# Patient Record
Sex: FEMALE | Race: BLACK OR AFRICAN AMERICAN | Employment: PART TIME
[De-identification: names, ages, dates, MRNs, and addresses within clinical notes are randomized per-mention and may not be internally consistent; named-entity substitution may affect disease eponyms.]

---

## 2017-01-05 RX ORDER — CETIRIZINE HYDROCHLORIDE 10 MG/1
TABLET ORAL
Qty: 30 TABLET | Refills: 1 | Status: SHIPPED | OUTPATIENT
Start: 2017-01-05 | End: 2017-03-02 | Stop reason: SDUPTHER

## 2017-01-12 RX ORDER — BUDESONIDE AND FORMOTEROL FUMARATE DIHYDRATE 160; 4.5 UG/1; UG/1
AEROSOL RESPIRATORY (INHALATION)
Qty: 10.2 INHALER | Refills: 11 | OUTPATIENT
Start: 2017-01-12

## 2017-01-13 RX ORDER — BUDESONIDE AND FORMOTEROL FUMARATE DIHYDRATE 160; 4.5 UG/1; UG/1
2 AEROSOL RESPIRATORY (INHALATION) 2 TIMES DAILY
Qty: 1 INHALER | Refills: 0 | Status: SHIPPED | OUTPATIENT
Start: 2017-01-13 | End: 2017-02-11 | Stop reason: SDUPTHER

## 2017-02-13 RX ORDER — BUDESONIDE AND FORMOTEROL FUMARATE DIHYDRATE 160; 4.5 UG/1; UG/1
AEROSOL RESPIRATORY (INHALATION)
Qty: 1 INHALER | Refills: 0 | Status: SHIPPED | OUTPATIENT
Start: 2017-02-13 | End: 2017-02-17 | Stop reason: SDUPTHER

## 2017-02-17 ENCOUNTER — OFFICE VISIT (OUTPATIENT)
Dept: PULMONOLOGY | Facility: CLINIC | Age: 47
End: 2017-02-17

## 2017-02-17 VITALS
RESPIRATION RATE: 16 BRPM | WEIGHT: 169.8 LBS | DIASTOLIC BLOOD PRESSURE: 96 MMHG | SYSTOLIC BLOOD PRESSURE: 145 MMHG | HEART RATE: 77 BPM | OXYGEN SATURATION: 99 % | HEIGHT: 69 IN | BODY MASS INDEX: 25.15 KG/M2

## 2017-02-17 DIAGNOSIS — Z76.0 MEDICATION REFILL: ICD-10-CM

## 2017-02-17 DIAGNOSIS — J45.909 UNCOMPLICATED ASTHMA, UNSPECIFIED ASTHMA SEVERITY: Primary | ICD-10-CM

## 2017-02-17 DIAGNOSIS — J44.9 CHRONIC OBSTRUCTIVE PULMONARY DISEASE, UNSPECIFIED COPD TYPE (HCC): ICD-10-CM

## 2017-02-17 DIAGNOSIS — K21.9 GASTROESOPHAGEAL REFLUX DISEASE WITHOUT ESOPHAGITIS: ICD-10-CM

## 2017-02-17 PROCEDURE — 99213 OFFICE O/P EST LOW 20 MIN: CPT | Performed by: NURSE PRACTITIONER

## 2017-02-17 RX ORDER — BUDESONIDE AND FORMOTEROL FUMARATE DIHYDRATE 160; 4.5 UG/1; UG/1
2 AEROSOL RESPIRATORY (INHALATION) 2 TIMES DAILY
Qty: 1 INHALER | Refills: 11 | Status: SHIPPED | OUTPATIENT
Start: 2017-02-17 | End: 2018-02-26 | Stop reason: SDUPTHER

## 2017-02-17 RX ORDER — AZITHROMYCIN 250 MG/1
TABLET, FILM COATED ORAL
Qty: 6 TABLET | Refills: 3 | Status: SHIPPED | OUTPATIENT
Start: 2017-02-17 | End: 2018-04-23

## 2017-02-17 RX ORDER — MONTELUKAST SODIUM 10 MG/1
TABLET ORAL
Qty: 30 TABLET | Refills: 11 | Status: SHIPPED | OUTPATIENT
Start: 2017-02-17 | End: 2018-03-07 | Stop reason: SDUPTHER

## 2017-02-17 RX ORDER — ALBUTEROL SULFATE 90 UG/1
2 AEROSOL, METERED RESPIRATORY (INHALATION) EVERY 6 HOURS PRN
Qty: 1 INHALER | Refills: 6 | Status: SHIPPED | OUTPATIENT
Start: 2017-02-17 | End: 2017-03-17 | Stop reason: DRUGHIGH

## 2017-02-17 RX ORDER — OMEPRAZOLE 20 MG/1
CAPSULE, DELAYED RELEASE ORAL
Qty: 30 CAPSULE | Refills: 11 | Status: SHIPPED | OUTPATIENT
Start: 2017-02-17 | End: 2018-02-26 | Stop reason: SDUPTHER

## 2017-03-02 RX ORDER — HYDROCHLOROTHIAZIDE 12.5 MG/1
CAPSULE, GELATIN COATED ORAL
Qty: 30 CAPSULE | Refills: 5 | Status: SHIPPED | OUTPATIENT
Start: 2017-03-02 | End: 2018-06-01 | Stop reason: ALTCHOICE

## 2017-03-02 RX ORDER — CETIRIZINE HYDROCHLORIDE 10 MG/1
TABLET ORAL
Qty: 30 TABLET | Refills: 1 | Status: SHIPPED | OUTPATIENT
Start: 2017-03-02 | End: 2017-04-26 | Stop reason: SDUPTHER

## 2017-03-17 ENCOUNTER — OFFICE VISIT (OUTPATIENT)
Dept: FAMILY MEDICINE CLINIC | Age: 47
End: 2017-03-17
Payer: COMMERCIAL

## 2017-03-17 VITALS
OXYGEN SATURATION: 99 % | BODY MASS INDEX: 24.27 KG/M2 | SYSTOLIC BLOOD PRESSURE: 142 MMHG | DIASTOLIC BLOOD PRESSURE: 86 MMHG | TEMPERATURE: 96 F | WEIGHT: 162 LBS | HEART RATE: 69 BPM

## 2017-03-17 DIAGNOSIS — A08.4 VIRAL GASTROENTERITIS: Primary | ICD-10-CM

## 2017-03-17 DIAGNOSIS — J44.9 CHRONIC OBSTRUCTIVE PULMONARY DISEASE, UNSPECIFIED COPD TYPE (HCC): ICD-10-CM

## 2017-03-17 DIAGNOSIS — I10 ESSENTIAL HYPERTENSION: ICD-10-CM

## 2017-03-17 DIAGNOSIS — Z13.9 SCREENING: ICD-10-CM

## 2017-03-17 PROCEDURE — 90732 PPSV23 VACC 2 YRS+ SUBQ/IM: CPT | Performed by: FAMILY MEDICINE

## 2017-03-17 PROCEDURE — 90471 IMMUNIZATION ADMIN: CPT | Performed by: FAMILY MEDICINE

## 2017-03-17 PROCEDURE — 99213 OFFICE O/P EST LOW 20 MIN: CPT | Performed by: FAMILY MEDICINE

## 2017-03-17 RX ORDER — LOPERAMIDE HYDROCHLORIDE 2 MG/1
2 CAPSULE ORAL 4 TIMES DAILY PRN
Qty: 20 CAPSULE | Refills: 0 | Status: SHIPPED | OUTPATIENT
Start: 2017-03-17 | End: 2017-03-22

## 2017-03-17 RX ORDER — ALBUTEROL SULFATE 2.5 MG/3ML
2.5 SOLUTION RESPIRATORY (INHALATION) EVERY 6 HOURS PRN
Qty: 120 EACH | Refills: 3 | Status: SHIPPED | OUTPATIENT
Start: 2017-03-17 | End: 2018-04-06 | Stop reason: SDUPTHER

## 2017-03-17 ASSESSMENT — ENCOUNTER SYMPTOMS
CONSTIPATION: 0
DIARRHEA: 1
BLOOD IN STOOL: 0
NAUSEA: 1
VOMITING: 0
RHINORRHEA: 0
SHORTNESS OF BREATH: 0
SINUS PRESSURE: 0
ABDOMINAL PAIN: 1

## 2017-04-26 RX ORDER — CETIRIZINE HYDROCHLORIDE 10 MG/1
TABLET ORAL
Qty: 30 TABLET | Refills: 1 | Status: SHIPPED | OUTPATIENT
Start: 2017-04-26 | End: 2017-07-27 | Stop reason: SDUPTHER

## 2017-07-17 ENCOUNTER — HOSPITAL ENCOUNTER (OUTPATIENT)
Age: 47
Setting detail: SPECIMEN
Discharge: HOME | End: 2017-07-17
Payer: COMMERCIAL

## 2017-07-17 DIAGNOSIS — I10 ESSENTIAL HYPERTENSION: ICD-10-CM

## 2017-07-17 LAB
ANION GAP SERPL CALCULATED.3IONS-SCNC: 17 MMOL/L (ref 9–17)
BUN BLDV-MCNC: 13 MG/DL (ref 6–20)
BUN/CREAT BLD: ABNORMAL (ref 9–20)
CALCIUM SERPL-MCNC: 9.6 MG/DL (ref 8.6–10.4)
CHLORIDE BLD-SCNC: 100 MMOL/L (ref 98–107)
CO2: 23 MMOL/L (ref 20–31)
CREAT SERPL-MCNC: 0.83 MG/DL (ref 0.5–0.9)
GFR AFRICAN AMERICAN: >60 ML/MIN
GFR NON-AFRICAN AMERICAN: >60 ML/MIN
GFR SERPL CREATININE-BSD FRML MDRD: ABNORMAL ML/MIN/{1.73_M2}
GFR SERPL CREATININE-BSD FRML MDRD: ABNORMAL ML/MIN/{1.73_M2}
GLUCOSE BLD-MCNC: 118 MG/DL (ref 70–99)
POTASSIUM SERPL-SCNC: 3.9 MMOL/L (ref 3.7–5.3)
SODIUM BLD-SCNC: 140 MMOL/L (ref 135–144)

## 2017-07-17 PROCEDURE — 80048 BASIC METABOLIC PNL TOTAL CA: CPT

## 2017-07-17 PROCEDURE — 36415 COLL VENOUS BLD VENIPUNCTURE: CPT

## 2017-07-27 RX ORDER — CETIRIZINE HYDROCHLORIDE 10 MG/1
TABLET ORAL
Qty: 30 TABLET | Refills: 1 | Status: SHIPPED | OUTPATIENT
Start: 2017-07-27 | End: 2017-09-20 | Stop reason: SDUPTHER

## 2017-08-01 ENCOUNTER — OFFICE VISIT (OUTPATIENT)
Dept: FAMILY MEDICINE CLINIC | Age: 47
End: 2017-08-01
Payer: COMMERCIAL

## 2017-08-01 VITALS
DIASTOLIC BLOOD PRESSURE: 86 MMHG | BODY MASS INDEX: 24.35 KG/M2 | TEMPERATURE: 97.7 F | SYSTOLIC BLOOD PRESSURE: 140 MMHG | HEART RATE: 84 BPM | HEIGHT: 69 IN | WEIGHT: 164.4 LBS

## 2017-08-01 DIAGNOSIS — I10 ESSENTIAL HYPERTENSION: Primary | ICD-10-CM

## 2017-08-01 DIAGNOSIS — B18.2 CHRONIC HEPATITIS C WITHOUT HEPATIC COMA (HCC): ICD-10-CM

## 2017-08-01 PROCEDURE — 99213 OFFICE O/P EST LOW 20 MIN: CPT | Performed by: FAMILY MEDICINE

## 2017-08-01 RX ORDER — HYDROCHLOROTHIAZIDE 25 MG/1
25 TABLET ORAL DAILY
Qty: 30 TABLET | Refills: 3 | Status: SHIPPED | OUTPATIENT
Start: 2017-08-01 | End: 2017-11-26 | Stop reason: SDUPTHER

## 2017-08-01 ASSESSMENT — PATIENT HEALTH QUESTIONNAIRE - PHQ9
2. FEELING DOWN, DEPRESSED OR HOPELESS: 0
SUM OF ALL RESPONSES TO PHQ9 QUESTIONS 1 & 2: 0
1. LITTLE INTEREST OR PLEASURE IN DOING THINGS: 0
SUM OF ALL RESPONSES TO PHQ QUESTIONS 1-9: 0

## 2017-08-01 ASSESSMENT — ENCOUNTER SYMPTOMS
NAUSEA: 0
DIARRHEA: 0
BLOOD IN STOOL: 0
SHORTNESS OF BREATH: 0
VOMITING: 0
ABDOMINAL PAIN: 0

## 2017-08-26 DIAGNOSIS — Z76.0 MEDICATION REFILL: ICD-10-CM

## 2017-09-21 RX ORDER — CETIRIZINE HYDROCHLORIDE 10 MG/1
TABLET ORAL
Qty: 30 TABLET | Refills: 1 | Status: SHIPPED | OUTPATIENT
Start: 2017-09-21 | End: 2017-11-26 | Stop reason: SDUPTHER

## 2017-11-26 DIAGNOSIS — I10 ESSENTIAL HYPERTENSION: ICD-10-CM

## 2017-11-27 NOTE — TELEPHONE ENCOUNTER
Medications are on med list please review and address    Please address the medication refill and close the encounter. If I can be of assistance, please route to the applicable pool. Thank you. Next Visit Date:  Future Appointments  Date Time Provider Hernandez Amezcua   2/16/2018 3:30 PM Carlie Glaser, DO Resp Spec [address] Maintenance   Topic Date Due    HIV screen  07/29/1984    Cervical cancer screen  06/20/2017    Flu vaccine (1) 09/01/2017    Lipid screen  07/29/2020    DTaP/Tdap/Td vaccine (2 - Td) 01/11/2023    Pneumococcal med risk  Completed       No results found for: LABA1C          ( goal A1C is < 7)   Microalb/Crt.  Ratio (mcg/mg creat)   Date Value   07/29/2015 4     LDL Cholesterol (mg/dL)   Date Value   07/29/2015 59       (goal LDL is <100)   AST (U/L)   Date Value   06/20/2014 27     ALT (U/L)   Date Value   06/20/2014 27     BUN (mg/dL)   Date Value   07/17/2017 13     BP Readings from Last 3 Encounters:   08/01/17 (!) 140/86   03/17/17 (!) 142/86   02/17/17 (!) 145/96          (goal 120/80)    All Future Testing planned in [facility]  Lab Frequency Next Occurrence               Patient Active Problem List:     Asthma     COPD (chronic obstructive pulmonary disease) (HCC)     Hepatitis C     GERD (gastroesophageal reflux disease)     Hyperlipidemia     HTN (hypertension)     Allergic dermatitis     Rash     Annual physical exam     Encounter for screening mammogram for malignant neoplasm of breast     Screening, lipid     Vaginal discharge     Cervicitis     Viral gastroenteritis

## 2017-11-28 RX ORDER — CETIRIZINE HYDROCHLORIDE 10 MG/1
TABLET ORAL
Qty: 30 TABLET | Refills: 1 | Status: SHIPPED | OUTPATIENT
Start: 2017-11-28 | End: 2018-01-22 | Stop reason: SDUPTHER

## 2017-11-28 RX ORDER — HYDROCHLOROTHIAZIDE 25 MG/1
TABLET ORAL
Qty: 30 TABLET | Refills: 3 | Status: SHIPPED | OUTPATIENT
Start: 2017-11-28 | End: 2018-03-25 | Stop reason: SDUPTHER

## 2018-01-22 RX ORDER — CETIRIZINE HYDROCHLORIDE 10 MG/1
TABLET ORAL
Qty: 30 TABLET | Refills: 1 | Status: SHIPPED | OUTPATIENT
Start: 2018-01-22 | End: 2018-03-25 | Stop reason: SDUPTHER

## 2018-02-12 NOTE — TELEPHONE ENCOUNTER
VIA FAX CAME OVER FOR A REQUEST FOR VENTOLIN INHALER. PLEASE SIGN THE ATTACHED SCRIPT.  Dianelys Bernabe

## 2018-02-13 RX ORDER — ALBUTEROL SULFATE 90 UG/1
2 AEROSOL, METERED RESPIRATORY (INHALATION) EVERY 6 HOURS PRN
Qty: 1 INHALER | Refills: 5 | Status: SHIPPED | OUTPATIENT
Start: 2018-02-13 | End: 2018-06-01 | Stop reason: SDUPTHER

## 2018-02-26 DIAGNOSIS — Z76.0 MEDICATION REFILL: ICD-10-CM

## 2018-03-02 RX ORDER — OMEPRAZOLE 20 MG/1
CAPSULE, DELAYED RELEASE ORAL
Qty: 30 CAPSULE | Refills: 0 | Status: SHIPPED | OUTPATIENT
Start: 2018-03-02 | End: 2018-04-06 | Stop reason: SDUPTHER

## 2018-03-02 RX ORDER — BUDESONIDE AND FORMOTEROL FUMARATE DIHYDRATE 160; 4.5 UG/1; UG/1
AEROSOL RESPIRATORY (INHALATION)
Qty: 1 INHALER | Refills: 0 | Status: SHIPPED | OUTPATIENT
Start: 2018-03-02 | End: 2018-04-06 | Stop reason: SDUPTHER

## 2018-03-07 DIAGNOSIS — Z76.0 MEDICATION REFILL: ICD-10-CM

## 2018-03-07 RX ORDER — MONTELUKAST SODIUM 10 MG/1
TABLET ORAL
Qty: 30 TABLET | Refills: 0 | Status: SHIPPED | OUTPATIENT
Start: 2018-03-07 | End: 2018-04-06 | Stop reason: SDUPTHER

## 2018-03-25 DIAGNOSIS — I10 ESSENTIAL HYPERTENSION: ICD-10-CM

## 2018-04-02 RX ORDER — CETIRIZINE HYDROCHLORIDE 10 MG/1
TABLET ORAL
Qty: 30 TABLET | Refills: 1 | Status: SHIPPED | OUTPATIENT
Start: 2018-04-02 | End: 2018-04-06 | Stop reason: SDUPTHER

## 2018-04-02 RX ORDER — HYDROCHLOROTHIAZIDE 25 MG/1
TABLET ORAL
Qty: 30 TABLET | Refills: 3 | Status: SHIPPED | OUTPATIENT
Start: 2018-04-02 | End: 2018-04-06 | Stop reason: SDUPTHER

## 2018-04-06 ENCOUNTER — OFFICE VISIT (OUTPATIENT)
Dept: FAMILY MEDICINE CLINIC | Age: 48
End: 2018-04-06
Payer: COMMERCIAL

## 2018-04-06 VITALS
OXYGEN SATURATION: 100 % | BODY MASS INDEX: 24.08 KG/M2 | HEART RATE: 80 BPM | TEMPERATURE: 97.6 F | DIASTOLIC BLOOD PRESSURE: 80 MMHG | HEIGHT: 69 IN | SYSTOLIC BLOOD PRESSURE: 120 MMHG | WEIGHT: 162.6 LBS

## 2018-04-06 DIAGNOSIS — J44.9 CHRONIC OBSTRUCTIVE PULMONARY DISEASE, UNSPECIFIED COPD TYPE (HCC): ICD-10-CM

## 2018-04-06 DIAGNOSIS — Z76.0 MEDICATION REFILL: ICD-10-CM

## 2018-04-06 DIAGNOSIS — I10 ESSENTIAL HYPERTENSION: Primary | ICD-10-CM

## 2018-04-06 DIAGNOSIS — B18.2 CHRONIC HEPATITIS C WITHOUT HEPATIC COMA (HCC): ICD-10-CM

## 2018-04-06 PROCEDURE — G8926 SPIRO NO PERF OR DOC: HCPCS | Performed by: FAMILY MEDICINE

## 2018-04-06 PROCEDURE — 1036F TOBACCO NON-USER: CPT | Performed by: FAMILY MEDICINE

## 2018-04-06 PROCEDURE — 3023F SPIROM DOC REV: CPT | Performed by: FAMILY MEDICINE

## 2018-04-06 PROCEDURE — G8420 CALC BMI NORM PARAMETERS: HCPCS | Performed by: FAMILY MEDICINE

## 2018-04-06 PROCEDURE — 99213 OFFICE O/P EST LOW 20 MIN: CPT | Performed by: FAMILY MEDICINE

## 2018-04-06 PROCEDURE — G8427 DOCREV CUR MEDS BY ELIG CLIN: HCPCS | Performed by: FAMILY MEDICINE

## 2018-04-06 RX ORDER — MONTELUKAST SODIUM 10 MG/1
TABLET ORAL
Qty: 30 TABLET | Refills: 5 | Status: SHIPPED | OUTPATIENT
Start: 2018-04-06 | End: 2018-09-30 | Stop reason: SDUPTHER

## 2018-04-06 RX ORDER — OMEPRAZOLE 20 MG/1
CAPSULE, DELAYED RELEASE ORAL
Qty: 30 CAPSULE | Refills: 1 | Status: SHIPPED | OUTPATIENT
Start: 2018-04-06 | End: 2018-06-06 | Stop reason: SDUPTHER

## 2018-04-06 RX ORDER — ALBUTEROL SULFATE 2.5 MG/3ML
2.5 SOLUTION RESPIRATORY (INHALATION) EVERY 6 HOURS PRN
Qty: 120 EACH | Refills: 3 | Status: SHIPPED | OUTPATIENT
Start: 2018-04-06 | End: 2019-07-23 | Stop reason: SDUPTHER

## 2018-04-06 RX ORDER — CETIRIZINE HYDROCHLORIDE 10 MG/1
TABLET ORAL
Qty: 30 TABLET | Refills: 1 | Status: SHIPPED | OUTPATIENT
Start: 2018-04-06 | End: 2018-08-04 | Stop reason: SDUPTHER

## 2018-04-06 RX ORDER — HYDROCHLOROTHIAZIDE 25 MG/1
TABLET ORAL
Qty: 30 TABLET | Refills: 3 | Status: SHIPPED | OUTPATIENT
Start: 2018-04-06 | End: 2018-12-04 | Stop reason: SDUPTHER

## 2018-04-06 ASSESSMENT — ENCOUNTER SYMPTOMS
VOMITING: 0
DIARRHEA: 0
ABDOMINAL PAIN: 0
NAUSEA: 0
SHORTNESS OF BREATH: 0
BLOOD IN STOOL: 0

## 2018-04-13 RX ORDER — BUDESONIDE AND FORMOTEROL FUMARATE DIHYDRATE 160; 4.5 UG/1; UG/1
AEROSOL RESPIRATORY (INHALATION)
Qty: 1 INHALER | Refills: 1 | Status: SHIPPED | OUTPATIENT
Start: 2018-04-13 | End: 2018-06-01 | Stop reason: SDUPTHER

## 2018-04-23 ENCOUNTER — HOSPITAL ENCOUNTER (OUTPATIENT)
Age: 48
Setting detail: SPECIMEN
Discharge: HOME | End: 2018-04-23
Payer: COMMERCIAL

## 2018-04-23 ENCOUNTER — OFFICE VISIT (OUTPATIENT)
Dept: FAMILY MEDICINE CLINIC | Age: 48
End: 2018-04-23
Payer: COMMERCIAL

## 2018-04-23 VITALS
DIASTOLIC BLOOD PRESSURE: 78 MMHG | HEART RATE: 91 BPM | WEIGHT: 156.6 LBS | SYSTOLIC BLOOD PRESSURE: 121 MMHG | HEIGHT: 69 IN | BODY MASS INDEX: 23.19 KG/M2 | TEMPERATURE: 97.5 F

## 2018-04-23 DIAGNOSIS — B34.9 VIRAL ILLNESS: ICD-10-CM

## 2018-04-23 DIAGNOSIS — Z20.5 EXPOSURE TO HEPATITIS A: ICD-10-CM

## 2018-04-23 DIAGNOSIS — J30.2 CHRONIC SEASONAL ALLERGIC RHINITIS DUE TO OTHER ALLERGEN: ICD-10-CM

## 2018-04-23 DIAGNOSIS — B34.9 VIRAL ILLNESS: Primary | ICD-10-CM

## 2018-04-23 LAB — HAV IGM SER IA-ACNC: NONREACTIVE

## 2018-04-23 PROCEDURE — 99213 OFFICE O/P EST LOW 20 MIN: CPT | Performed by: INTERNAL MEDICINE

## 2018-04-23 PROCEDURE — 1036F TOBACCO NON-USER: CPT | Performed by: INTERNAL MEDICINE

## 2018-04-23 PROCEDURE — G8427 DOCREV CUR MEDS BY ELIG CLIN: HCPCS | Performed by: INTERNAL MEDICINE

## 2018-04-23 PROCEDURE — 99213 OFFICE O/P EST LOW 20 MIN: CPT

## 2018-04-23 PROCEDURE — G8420 CALC BMI NORM PARAMETERS: HCPCS | Performed by: INTERNAL MEDICINE

## 2018-04-23 RX ORDER — FLUTICASONE PROPIONATE 50 MCG
1 SPRAY, SUSPENSION (ML) NASAL DAILY
Qty: 1 BOTTLE | Refills: 3 | Status: SHIPPED | OUTPATIENT
Start: 2018-04-23 | End: 2019-04-09

## 2018-04-23 ASSESSMENT — ENCOUNTER SYMPTOMS
COUGH: 0
ABDOMINAL PAIN: 0
SHORTNESS OF BREATH: 0

## 2018-05-07 ENCOUNTER — TELEPHONE (OUTPATIENT)
Dept: FAMILY MEDICINE CLINIC | Age: 48
End: 2018-05-07

## 2018-06-01 ENCOUNTER — OFFICE VISIT (OUTPATIENT)
Dept: PULMONOLOGY | Age: 48
End: 2018-06-01
Payer: COMMERCIAL

## 2018-06-01 VITALS
WEIGHT: 156 LBS | OXYGEN SATURATION: 97 % | RESPIRATION RATE: 15 BRPM | BODY MASS INDEX: 23.64 KG/M2 | SYSTOLIC BLOOD PRESSURE: 136 MMHG | HEART RATE: 87 BPM | HEIGHT: 68 IN | DIASTOLIC BLOOD PRESSURE: 87 MMHG

## 2018-06-01 DIAGNOSIS — B37.0 ORAL THRUSH: ICD-10-CM

## 2018-06-01 DIAGNOSIS — J45.50 POORLY CONTROLLED SEVERE PERSISTENT ASTHMA WITHOUT COMPLICATION: Primary | ICD-10-CM

## 2018-06-01 DIAGNOSIS — Z76.0 MEDICATION REFILL: ICD-10-CM

## 2018-06-01 DIAGNOSIS — J82.83 EOSINOPHILIC ASTHMA: ICD-10-CM

## 2018-06-01 PROCEDURE — G8420 CALC BMI NORM PARAMETERS: HCPCS | Performed by: INTERNAL MEDICINE

## 2018-06-01 PROCEDURE — G8427 DOCREV CUR MEDS BY ELIG CLIN: HCPCS | Performed by: INTERNAL MEDICINE

## 2018-06-01 PROCEDURE — 1036F TOBACCO NON-USER: CPT | Performed by: INTERNAL MEDICINE

## 2018-06-01 PROCEDURE — 99214 OFFICE O/P EST MOD 30 MIN: CPT | Performed by: INTERNAL MEDICINE

## 2018-06-01 RX ORDER — BUDESONIDE AND FORMOTEROL FUMARATE DIHYDRATE 160; 4.5 UG/1; UG/1
AEROSOL RESPIRATORY (INHALATION)
Qty: 1 INHALER | Refills: 11 | Status: SHIPPED | OUTPATIENT
Start: 2018-06-01 | End: 2019-08-13 | Stop reason: SDUPTHER

## 2018-06-01 RX ORDER — ALBUTEROL SULFATE 90 UG/1
2 AEROSOL, METERED RESPIRATORY (INHALATION) EVERY 4 HOURS PRN
Qty: 2 INHALER | Refills: 11 | Status: SHIPPED | OUTPATIENT
Start: 2018-06-01 | End: 2019-07-21 | Stop reason: SDUPTHER

## 2018-06-01 RX ORDER — PREDNISONE 10 MG/1
TABLET ORAL
Qty: 30 TABLET | Refills: 0 | Status: CANCELLED | OUTPATIENT
Start: 2018-06-01

## 2018-06-01 RX ORDER — AZITHROMYCIN 250 MG/1
TABLET, FILM COATED ORAL
Qty: 1 PACKET | Refills: 0 | Status: SHIPPED | OUTPATIENT
Start: 2018-06-01 | End: 2018-06-05

## 2018-06-01 RX ORDER — PREDNISONE 10 MG/1
TABLET ORAL
Qty: 30 TABLET | Refills: 0 | Status: SHIPPED | OUTPATIENT
Start: 2018-06-01 | End: 2018-08-18 | Stop reason: ALTCHOICE

## 2018-06-01 ASSESSMENT — ENCOUNTER SYMPTOMS
GASTROINTESTINAL NEGATIVE: 1
WHEEZING: 1
EYES NEGATIVE: 1
CHOKING: 1
SHORTNESS OF BREATH: 1
CHEST TIGHTNESS: 1

## 2018-06-06 DIAGNOSIS — Z76.0 MEDICATION REFILL: ICD-10-CM

## 2018-06-06 DIAGNOSIS — B18.2 CHRONIC HEPATITIS C WITHOUT HEPATIC COMA (HCC): ICD-10-CM

## 2018-06-06 RX ORDER — OMEPRAZOLE 20 MG/1
CAPSULE, DELAYED RELEASE ORAL
Qty: 30 CAPSULE | Refills: 1 | Status: SHIPPED | OUTPATIENT
Start: 2018-06-06 | End: 2018-08-04 | Stop reason: SDUPTHER

## 2018-06-14 ENCOUNTER — OFFICE VISIT (OUTPATIENT)
Dept: GASTROENTEROLOGY | Age: 48
End: 2018-06-14
Payer: COMMERCIAL

## 2018-06-14 VITALS
WEIGHT: 163 LBS | BODY MASS INDEX: 24.78 KG/M2 | OXYGEN SATURATION: 99 % | HEART RATE: 77 BPM | SYSTOLIC BLOOD PRESSURE: 127 MMHG | DIASTOLIC BLOOD PRESSURE: 83 MMHG

## 2018-06-14 DIAGNOSIS — B18.2 CHRONIC HEPATITIS C WITHOUT HEPATIC COMA (HCC): ICD-10-CM

## 2018-06-14 DIAGNOSIS — B17.10 ACUTE HEPATITIS C VIRUS INFECTION WITHOUT HEPATIC COMA: Primary | ICD-10-CM

## 2018-06-14 DIAGNOSIS — R14.0 GASSINESS: ICD-10-CM

## 2018-06-14 PROCEDURE — G8427 DOCREV CUR MEDS BY ELIG CLIN: HCPCS | Performed by: INTERNAL MEDICINE

## 2018-06-14 PROCEDURE — G8420 CALC BMI NORM PARAMETERS: HCPCS | Performed by: INTERNAL MEDICINE

## 2018-06-14 PROCEDURE — 1036F TOBACCO NON-USER: CPT | Performed by: INTERNAL MEDICINE

## 2018-06-14 PROCEDURE — 99203 OFFICE O/P NEW LOW 30 MIN: CPT | Performed by: INTERNAL MEDICINE

## 2018-06-14 ASSESSMENT — ENCOUNTER SYMPTOMS
TROUBLE SWALLOWING: 0
ANAL BLEEDING: 0
SORE THROAT: 0
CONSTIPATION: 0
BACK PAIN: 0
BLOOD IN STOOL: 0
SINUS PAIN: 0
DIARRHEA: 0
ABDOMINAL PAIN: 0
CHEST TIGHTNESS: 0
VOMITING: 0
SINUS PRESSURE: 0
NAUSEA: 0
SHORTNESS OF BREATH: 0
RECTAL PAIN: 0
ABDOMINAL DISTENTION: 0
WHEEZING: 0

## 2018-06-26 ENCOUNTER — HOSPITAL ENCOUNTER (OUTPATIENT)
Dept: ULTRASOUND IMAGING | Age: 48
Discharge: HOME | End: 2018-06-28
Payer: COMMERCIAL

## 2018-06-26 DIAGNOSIS — R14.0 GASSINESS: ICD-10-CM

## 2018-06-26 DIAGNOSIS — B18.2 CHRONIC HEPATITIS C WITHOUT HEPATIC COMA (HCC): ICD-10-CM

## 2018-06-26 PROCEDURE — 76705 ECHO EXAM OF ABDOMEN: CPT

## 2018-08-04 DIAGNOSIS — B18.2 CHRONIC HEPATITIS C WITHOUT HEPATIC COMA (HCC): ICD-10-CM

## 2018-08-04 DIAGNOSIS — Z76.0 MEDICATION REFILL: ICD-10-CM

## 2018-08-06 RX ORDER — OMEPRAZOLE 20 MG/1
CAPSULE, DELAYED RELEASE ORAL
Qty: 30 CAPSULE | Refills: 1 | Status: SHIPPED | OUTPATIENT
Start: 2018-08-06 | End: 2018-09-30 | Stop reason: SDUPTHER

## 2018-08-06 RX ORDER — CETIRIZINE HYDROCHLORIDE 10 MG/1
TABLET ORAL
Qty: 30 TABLET | Refills: 1 | Status: SHIPPED | OUTPATIENT
Start: 2018-08-06 | End: 2018-10-03 | Stop reason: SDUPTHER

## 2018-08-18 ENCOUNTER — APPOINTMENT (OUTPATIENT)
Dept: GENERAL RADIOLOGY | Age: 48
End: 2018-08-18
Payer: COMMERCIAL

## 2018-08-18 ENCOUNTER — HOSPITAL ENCOUNTER (EMERGENCY)
Age: 48
Discharge: HOME | End: 2018-08-18
Attending: EMERGENCY MEDICINE
Payer: COMMERCIAL

## 2018-08-18 VITALS
RESPIRATION RATE: 16 BRPM | WEIGHT: 160 LBS | SYSTOLIC BLOOD PRESSURE: 137 MMHG | HEIGHT: 68 IN | OXYGEN SATURATION: 98 % | BODY MASS INDEX: 24.25 KG/M2 | TEMPERATURE: 97.2 F | DIASTOLIC BLOOD PRESSURE: 90 MMHG | HEART RATE: 81 BPM

## 2018-08-18 DIAGNOSIS — J44.1 COPD EXACERBATION (HCC): Primary | ICD-10-CM

## 2018-08-18 PROCEDURE — 71046 X-RAY EXAM CHEST 2 VIEWS: CPT

## 2018-08-18 PROCEDURE — 99284 EMERGENCY DEPT VISIT MOD MDM: CPT

## 2018-08-18 PROCEDURE — 6370000000 HC RX 637 (ALT 250 FOR IP): Performed by: PHYSICIAN ASSISTANT

## 2018-08-18 RX ORDER — BENZONATATE 100 MG/1
100 CAPSULE ORAL ONCE
Status: COMPLETED | OUTPATIENT
Start: 2018-08-18 | End: 2018-08-18

## 2018-08-18 RX ORDER — PREDNISONE 50 MG/1
50 TABLET ORAL DAILY
Qty: 4 TABLET | Refills: 0 | Status: SHIPPED | OUTPATIENT
Start: 2018-08-18 | End: 2018-08-22

## 2018-08-18 RX ORDER — AZITHROMYCIN 250 MG/1
250 TABLET, FILM COATED ORAL DAILY
Qty: 4 TABLET | Refills: 0 | Status: SHIPPED | OUTPATIENT
Start: 2018-08-18 | End: 2018-08-22

## 2018-08-18 RX ORDER — AZITHROMYCIN 250 MG/1
500 TABLET, FILM COATED ORAL ONCE
Status: COMPLETED | OUTPATIENT
Start: 2018-08-18 | End: 2018-08-18

## 2018-08-18 RX ORDER — BENZONATATE 100 MG/1
100 CAPSULE ORAL 3 TIMES DAILY PRN
Qty: 12 CAPSULE | Refills: 0 | Status: SHIPPED | OUTPATIENT
Start: 2018-08-18 | End: 2020-08-25

## 2018-08-18 RX ORDER — PREDNISONE 20 MG/1
60 TABLET ORAL ONCE
Status: COMPLETED | OUTPATIENT
Start: 2018-08-18 | End: 2018-08-18

## 2018-08-18 RX ADMIN — AZITHROMYCIN 500 MG: 250 TABLET, FILM COATED ORAL at 16:28

## 2018-08-18 RX ADMIN — BENZONATATE 100 MG: 100 CAPSULE ORAL at 15:36

## 2018-08-18 RX ADMIN — PREDNISONE 60 MG: 20 TABLET ORAL at 16:28

## 2018-08-18 ASSESSMENT — ENCOUNTER SYMPTOMS
NAUSEA: 0
BACK PAIN: 0
ABDOMINAL PAIN: 0
COUGH: 1
EYE ITCHING: 0
RHINORRHEA: 0
EYE PAIN: 0
WHEEZING: 1
EYE DISCHARGE: 0
COLOR CHANGE: 0
VOMITING: 0
SINUS PRESSURE: 0
SORE THROAT: 1

## 2018-08-18 ASSESSMENT — PAIN DESCRIPTION - PAIN TYPE: TYPE: ACUTE PAIN

## 2018-08-18 ASSESSMENT — PAIN SCALES - WONG BAKER: WONGBAKER_NUMERICALRESPONSE: 4

## 2018-08-18 ASSESSMENT — PAIN DESCRIPTION - DESCRIPTORS: DESCRIPTORS: ACHING

## 2018-08-18 NOTE — ED TRIAGE NOTES
Pt reported nasal congestion, cough and generalized body aches x one week. Pt sts she took nyquill and it helped and \"the cough was yellow stuff but it is getting better\". Pt sts \"I have a Z-arleth in my purse but I have not taken it\". Pt is alert ox3 and cooperative without distress noted at this time. Pt talking in complete sentences.

## 2018-08-18 NOTE — ED PROVIDER NOTES
regular rhythm and normal heart sounds. Exam reveals no gallop. No murmur heard. Pulmonary/Chest: Effort normal and breath sounds normal. No stridor. No tachypnea. No respiratory distress. She has no decreased breath sounds. She has no wheezes. Abdominal: There is no tenderness. There is no rebound and no guarding. Musculoskeletal: Normal range of motion. She exhibits no edema. Neurological: She is alert and oriented to person, place, and time. Coordination normal.   Skin: Skin is warm and dry. No rash (on exposed surfaces) noted. She is not diaphoretic. No pallor. Psychiatric: She has a normal mood and affect. Her behavior is normal.       DIFFERENTIAL  DIAGNOSIS       COPD, bronchitis, pneumonia, viral illness    PLAN (LABS / IMAGING / EKG):  Orders Placed This Encounter   Procedures    XR CHEST STANDARD (2 VW)       MEDICATIONS ORDERED:  Orders Placed This Encounter   Medications    benzonatate (TESSALON) capsule 100 mg    azithromycin (ZITHROMAX) 250 MG tablet     Sig: Take 1 tablet by mouth daily for 4 days     Dispense:  4 tablet     Refill:  0    predniSONE (DELTASONE) 50 MG tablet     Sig: Take 1 tablet by mouth daily for 4 days     Dispense:  4 tablet     Refill:  0    predniSONE (DELTASONE) tablet 60 mg    azithromycin (ZITHROMAX) tablet 500 mg    benzonatate (TESSALON PERLES) 100 MG capsule     Sig: Take 1 capsule by mouth 3 times daily as needed for Cough     Dispense:  12 capsule     Refill:  0       Controlled Substances Monitoring:      DIAGNOSTIC RESULTS / [address] [place] [address] / [facility]   X-ray does not show any active pneumonia. We'll treat patient as a COPD exacerbation. RADIOLOGY:   I directly visualized (with the attending physician) the following  images and reviewed the radiologist interpretations:  No results found. XR CHEST STANDARD (2 VW)   Final Result   No acute cardiopulmonary process identified.              LABS:  No results found for this visit on 08/18/18.         FINAL IMPRESSION      1. COPD exacerbation ([address]          DISPOSITION / PLAN     DISPOSITION Decision To Discharge    PATIENT REFERRED TO:  Violette Morales MD  [address] [address]  [phone]    Schedule an appointment as soon as possible for a visit         DISCHARGE MEDICATIONS:  New Prescriptions    AZITHROMYCIN (ZITHROMAX) 250 MG TABLET    Take 1 tablet by mouth daily for 4 days    BENZONATATE (TESSALON PERLES) 100 MG CAPSULE    Take 1 capsule by mouth 3 times daily as needed for Cough    PREDNISONE (DELTASONE) 50 MG TABLET    Take 1 tablet by mouth daily for 4 days       Shirlene Bowden PA-C   Emergency Medicine Physician Assistant    (Please note that portions of this note were completed with a voice recognition program.  Efforts were made to edit the dictations but occasionally words are mis-transcribed.)       Shirlene Bowden PA-C  08/18/18 0863

## 2018-08-21 ENCOUNTER — TELEPHONE (OUTPATIENT)
Dept: FAMILY MEDICINE CLINIC | Age: 48
End: 2018-08-21

## 2018-08-22 ENCOUNTER — TELEPHONE (OUTPATIENT)
Dept: FAMILY MEDICINE CLINIC | Age: 48
End: 2018-08-22

## 2018-10-03 DIAGNOSIS — Z76.0 MEDICATION REFILL: ICD-10-CM

## 2018-10-05 RX ORDER — CETIRIZINE HYDROCHLORIDE 10 MG/1
TABLET ORAL
Qty: 30 TABLET | Refills: 1 | Status: SHIPPED | OUTPATIENT
Start: 2018-10-05 | End: 2018-11-29 | Stop reason: SDUPTHER

## 2018-11-29 DIAGNOSIS — Z76.0 MEDICATION REFILL: ICD-10-CM

## 2018-11-29 RX ORDER — CETIRIZINE HYDROCHLORIDE 10 MG/1
TABLET ORAL
Qty: 30 TABLET | Refills: 1 | Status: SHIPPED | OUTPATIENT
Start: 2018-11-29 | End: 2019-01-30 | Stop reason: SDUPTHER

## 2018-11-29 NOTE — TELEPHONE ENCOUNTER
Please address the medication refill and close the encounter. If I can be of assistance, please route to the applicable pool. Thank you. Last visit:4/23/2018  Last Med refill:11/5/2018    Next Visit Date:  No future appointments. Health Maintenance   Topic Date Due    HIV screen  07/29/1984    Cervical cancer screen  06/20/2017    Potassium monitoring  07/17/2018    Creatinine monitoring  07/17/2018    Flu vaccine (1) 09/01/2018    Lipid screen  07/29/2020    DTaP/Tdap/Td vaccine (2 - Td) 01/11/2023    Pneumococcal med risk  Completed       No results found for: LABA1C          ( goal A1C is < 7)   Microalb/Crt.  Ratio (mcg/mg creat)   Date Value   07/29/2015 4     LDL Cholesterol (mg/dL)   Date Value   07/29/2015 59   06/20/2014 24       (goal LDL is <100)   AST (U/L)   Date Value   06/20/2014 27     ALT (U/L)   Date Value   06/20/2014 27     BUN (mg/dL)   Date Value   07/17/2017 13     BP Readings from Last 3 Encounters:   08/18/18 (!) 137/90   06/14/18 127/83   06/01/18 136/87          (goal 120/80)    All Future Testing planned in [facility]  Lab Frequency Next Occurrence   Eosinophil Count Once 10/05/2018   IgE Once 10/05/2018   Hepatitis A Antibody, Total Once 09/14/2018   Hepatitis B Core Antibody, Total Once 09/14/2018   Hepatitis B Surface Antibody Once 09/14/2018   Hepatitis C RNA, quantitative, PCR Once 09/14/2018   Hepatitis C Genotype Once 09/14/2018   Hepatitis B Surface Antigen Once 09/14/2018   BARRIE Once 09/14/2018   Mitochondrial antibodies, M2 Once 09/12/2018   IgM Once 09/12/2018   Anti-smooth muscle antibody Once 09/12/2018   IgG Once 09/12/2018   Iron Profile Once 09/12/2018   Ferritin Once 09/12/2018   CBC Once 83/11/2886   Comp Metabolic w Bili Profile Once 09/12/2018   Liver Fibrosis, Chronic Viral Hepatitis Once 06/14/2018               Patient Active Problem List:     Asthma     COPD (chronic obstructive pulmonary disease) ([facility]     Hepatitis C     GERD (gastroesophageal

## 2018-12-03 ENCOUNTER — OFFICE VISIT (OUTPATIENT)
Dept: FAMILY MEDICINE CLINIC | Age: 48
End: 2018-12-03
Payer: COMMERCIAL

## 2018-12-03 ENCOUNTER — HOSPITAL ENCOUNTER (OUTPATIENT)
Age: 48
Setting detail: SPECIMEN
Discharge: HOME | End: 2018-12-03
Payer: COMMERCIAL

## 2018-12-03 VITALS
SYSTOLIC BLOOD PRESSURE: 133 MMHG | HEIGHT: 69 IN | BODY MASS INDEX: 22.25 KG/M2 | WEIGHT: 150.2 LBS | DIASTOLIC BLOOD PRESSURE: 89 MMHG | TEMPERATURE: 97.4 F | HEART RATE: 75 BPM

## 2018-12-03 DIAGNOSIS — Z00.00 HEALTHCARE MAINTENANCE: ICD-10-CM

## 2018-12-03 DIAGNOSIS — J44.1 ACUTE EXACERBATION OF CHRONIC OBSTRUCTIVE AIRWAYS DISEASE (HCC): Primary | ICD-10-CM

## 2018-12-03 DIAGNOSIS — B37.0 ORAL THRUSH: ICD-10-CM

## 2018-12-03 LAB — HIV AG/AB: NONREACTIVE

## 2018-12-03 PROCEDURE — G8484 FLU IMMUNIZE NO ADMIN: HCPCS | Performed by: STUDENT IN AN ORGANIZED HEALTH CARE EDUCATION/TRAINING PROGRAM

## 2018-12-03 PROCEDURE — G8926 SPIRO NO PERF OR DOC: HCPCS | Performed by: STUDENT IN AN ORGANIZED HEALTH CARE EDUCATION/TRAINING PROGRAM

## 2018-12-03 PROCEDURE — G8427 DOCREV CUR MEDS BY ELIG CLIN: HCPCS | Performed by: STUDENT IN AN ORGANIZED HEALTH CARE EDUCATION/TRAINING PROGRAM

## 2018-12-03 PROCEDURE — 3023F SPIROM DOC REV: CPT | Performed by: STUDENT IN AN ORGANIZED HEALTH CARE EDUCATION/TRAINING PROGRAM

## 2018-12-03 PROCEDURE — 1036F TOBACCO NON-USER: CPT | Performed by: STUDENT IN AN ORGANIZED HEALTH CARE EDUCATION/TRAINING PROGRAM

## 2018-12-03 PROCEDURE — 99211 OFF/OP EST MAY X REQ PHY/QHP: CPT | Performed by: STUDENT IN AN ORGANIZED HEALTH CARE EDUCATION/TRAINING PROGRAM

## 2018-12-03 PROCEDURE — 99213 OFFICE O/P EST LOW 20 MIN: CPT | Performed by: STUDENT IN AN ORGANIZED HEALTH CARE EDUCATION/TRAINING PROGRAM

## 2018-12-03 PROCEDURE — G8420 CALC BMI NORM PARAMETERS: HCPCS | Performed by: STUDENT IN AN ORGANIZED HEALTH CARE EDUCATION/TRAINING PROGRAM

## 2018-12-03 RX ORDER — PREDNISONE 20 MG/1
20 TABLET ORAL 2 TIMES DAILY
Qty: 10 TABLET | Refills: 0 | Status: SHIPPED | OUTPATIENT
Start: 2018-12-03 | End: 2018-12-08

## 2018-12-03 RX ORDER — GUAIFENESIN 600 MG/1
600 TABLET, EXTENDED RELEASE ORAL 2 TIMES DAILY
Qty: 30 TABLET | Refills: 0 | Status: SHIPPED | OUTPATIENT
Start: 2018-12-03 | End: 2018-12-18

## 2018-12-03 RX ORDER — DOXYCYCLINE HYCLATE 100 MG
100 TABLET ORAL 2 TIMES DAILY
Qty: 20 TABLET | Refills: 0 | Status: SHIPPED | OUTPATIENT
Start: 2018-12-03 | End: 2018-12-13

## 2018-12-03 ASSESSMENT — ENCOUNTER SYMPTOMS
WHEEZING: 1
VOMITING: 0
CHEST TIGHTNESS: 1
CONSTIPATION: 0
DIARRHEA: 0
NAUSEA: 0
COUGH: 1
GASTROINTESTINAL NEGATIVE: 1
SHORTNESS OF BREATH: 1
EYES NEGATIVE: 1

## 2018-12-03 ASSESSMENT — PATIENT HEALTH QUESTIONNAIRE - PHQ9
1. LITTLE INTEREST OR PLEASURE IN DOING THINGS: 0
SUM OF ALL RESPONSES TO PHQ QUESTIONS 1-9: 0
SUM OF ALL RESPONSES TO PHQ9 QUESTIONS 1 & 2: 0
2. FEELING DOWN, DEPRESSED OR HOPELESS: 0
SUM OF ALL RESPONSES TO PHQ QUESTIONS 1-9: 0

## 2018-12-03 NOTE — PATIENT INSTRUCTIONS
Thank you for letting us take care of you today. We hope all your questions were addressed. If a question was overlooked or something else comes to mind after you return home, please contact a member of your Care Team listed below. Please make sure you have a routine office visit set up to follow-up on [address]. Your Care Team at [place] [address] is Team #1  Castillo Marie MD (Faculty)  Michael Mazariegos MD (Faculty)  Lucia Randall MD (Resident)  Bre Vidal MD (Resident)  Neida Garza MD (Resident)  Lexus Quintana MD (Resident)  [facility]  [facility] [address] [facility], ([address])  HARISH [place], (Clinical Practice Manager)  Idalmis Stoner, Ph.D., (Behavioral Services)  [facility], [address] [place] [address] (Clinical Pharmacist)     Office phone number: [phone]    If you need to get in right away due to illness, please be advised we have \"Same Day\" appointments available Monday-Friday. Please call us at [phone] option #3 to schedule your \"Same Day\" appointment.

## 2018-12-03 NOTE — LETTER
[address]  [address] [place] [address]  Phone: [phone]  Fax: [phone]    Deneen Cm MD        December 3, 2018     Patient: Lawana Kehr   YOB: 1970   Date of Visit: 12/3/2018       To Whom It May Concern: It is my medical opinion that Lawana Kehr should remain out of work until 12/4/2018. If you have any questions or concerns, please don't hesitate to call.     Sincerely,          Deneen Cm MD

## 2018-12-04 DIAGNOSIS — I10 ESSENTIAL HYPERTENSION: ICD-10-CM

## 2018-12-04 LAB
ANION GAP SERPL CALCULATED.3IONS-SCNC: 15 MMOL/L (ref 9–17)
BUN BLDV-MCNC: 8 MG/DL (ref 6–20)
BUN/CREAT BLD: NORMAL (ref 9–20)
CALCIUM SERPL-MCNC: 9.9 MG/DL (ref 8.6–10.4)
CHLORIDE BLD-SCNC: 99 MMOL/L (ref 98–107)
CO2: 24 MMOL/L (ref 20–31)
CREAT SERPL-MCNC: 0.88 MG/DL (ref 0.5–0.9)
GFR AFRICAN AMERICAN: >60 ML/MIN
GFR NON-AFRICAN AMERICAN: >60 ML/MIN
GFR SERPL CREATININE-BSD FRML MDRD: NORMAL ML/MIN/{1.73_M2}
GFR SERPL CREATININE-BSD FRML MDRD: NORMAL ML/MIN/{1.73_M2}
GLUCOSE BLD-MCNC: 93 MG/DL (ref 70–99)
POTASSIUM SERPL-SCNC: 4 MMOL/L (ref 3.7–5.3)
SODIUM BLD-SCNC: 138 MMOL/L (ref 135–144)

## 2018-12-04 RX ORDER — HYDROCHLOROTHIAZIDE 25 MG/1
TABLET ORAL
Qty: 120 TABLET | Refills: 0 | Status: SHIPPED | OUTPATIENT
Start: 2018-12-04 | End: 2019-06-15 | Stop reason: SDUPTHER

## 2019-01-30 DIAGNOSIS — Z76.0 MEDICATION REFILL: ICD-10-CM

## 2019-01-31 RX ORDER — CETIRIZINE HYDROCHLORIDE 10 MG/1
TABLET ORAL
Qty: 30 TABLET | Refills: 1 | Status: SHIPPED | OUTPATIENT
Start: 2019-01-31 | End: 2019-04-12 | Stop reason: SDUPTHER

## 2019-04-09 ENCOUNTER — OFFICE VISIT (OUTPATIENT)
Dept: FAMILY MEDICINE CLINIC | Age: 49
End: 2019-04-09
Payer: COMMERCIAL

## 2019-04-09 VITALS
WEIGHT: 147 LBS | HEIGHT: 69 IN | HEART RATE: 80 BPM | OXYGEN SATURATION: 98 % | TEMPERATURE: 98.2 F | BODY MASS INDEX: 21.77 KG/M2 | SYSTOLIC BLOOD PRESSURE: 131 MMHG | DIASTOLIC BLOOD PRESSURE: 90 MMHG

## 2019-04-09 DIAGNOSIS — J44.9 CHRONIC OBSTRUCTIVE PULMONARY DISEASE, UNSPECIFIED COPD TYPE (HCC): ICD-10-CM

## 2019-04-09 DIAGNOSIS — J06.9 VIRAL URI: Primary | ICD-10-CM

## 2019-04-09 DIAGNOSIS — J45.20 MILD INTERMITTENT ASTHMA, UNSPECIFIED WHETHER COMPLICATED: ICD-10-CM

## 2019-04-09 DIAGNOSIS — Z72.0 TOBACCO ABUSE: ICD-10-CM

## 2019-04-09 PROCEDURE — 3023F SPIROM DOC REV: CPT | Performed by: FAMILY MEDICINE

## 2019-04-09 PROCEDURE — 99213 OFFICE O/P EST LOW 20 MIN: CPT | Performed by: FAMILY MEDICINE

## 2019-04-09 PROCEDURE — G8420 CALC BMI NORM PARAMETERS: HCPCS | Performed by: FAMILY MEDICINE

## 2019-04-09 PROCEDURE — G8428 CUR MEDS NOT DOCUMENT: HCPCS | Performed by: FAMILY MEDICINE

## 2019-04-09 PROCEDURE — 1036F TOBACCO NON-USER: CPT | Performed by: FAMILY MEDICINE

## 2019-04-09 PROCEDURE — 87804 INFLUENZA ASSAY W/OPTIC: CPT | Performed by: FAMILY MEDICINE

## 2019-04-09 PROCEDURE — G8926 SPIRO NO PERF OR DOC: HCPCS | Performed by: FAMILY MEDICINE

## 2019-04-09 RX ORDER — GUAIFENESIN 600 MG/1
1200 TABLET, EXTENDED RELEASE ORAL 2 TIMES DAILY
Qty: 28 TABLET | Refills: 0 | Status: SHIPPED | OUTPATIENT
Start: 2019-04-09 | End: 2019-04-16

## 2019-04-09 RX ORDER — METHYLPREDNISOLONE 4 MG/1
TABLET ORAL
Qty: 1 KIT | Refills: 0 | Status: SHIPPED | OUTPATIENT
Start: 2019-04-09 | End: 2019-04-15

## 2019-04-09 RX ORDER — FLUTICASONE PROPIONATE 50 MCG
2 SPRAY, SUSPENSION (ML) NASAL DAILY
Qty: 1 BOTTLE | Refills: 0 | Status: SHIPPED | OUTPATIENT
Start: 2019-04-09

## 2019-04-09 RX ORDER — NICOTINE 21 MG/24HR
1 PATCH, TRANSDERMAL 24 HOURS TRANSDERMAL EVERY 24 HOURS
Qty: 30 PATCH | Refills: 3 | Status: SHIPPED | OUTPATIENT
Start: 2019-04-09 | End: 2020-04-08

## 2019-04-09 ASSESSMENT — PATIENT HEALTH QUESTIONNAIRE - PHQ9
SUM OF ALL RESPONSES TO PHQ QUESTIONS 1-9: 0
2. FEELING DOWN, DEPRESSED OR HOPELESS: 0
1. LITTLE INTEREST OR PLEASURE IN DOING THINGS: 0
SUM OF ALL RESPONSES TO PHQ QUESTIONS 1-9: 0
SUM OF ALL RESPONSES TO PHQ9 QUESTIONS 1 & 2: 0

## 2019-04-09 ASSESSMENT — ENCOUNTER SYMPTOMS
NAUSEA: 0
RHINORRHEA: 1
SHORTNESS OF BREATH: 0
SINUS PAIN: 1
SINUS PRESSURE: 1
BLOOD IN STOOL: 0
VOMITING: 0
ABDOMINAL PAIN: 0
WHEEZING: 1
COUGH: 1
SORE THROAT: 1
DIARRHEA: 0

## 2019-04-09 NOTE — PROGRESS NOTES
will give him small dose of steroids and see patient back for reassessment  - guaiFENesin (MUCINEX) 600 MG extended release tablet; Take 2 tablets by mouth 2 times daily for 7 days  Dispense: 28 tablet; Refill: 0  - fluticasone (FLONASE) 50 MCG/ACT nasal spray; 2 sprays by Each Nare route daily  Dispense: 1 Bottle; Refill: 0  - methylPREDNISolone (MEDROL DOSEPACK) 4 MG tablet; Take by mouth. Dispense: 1 kit; Refill: 0  - POCT Influenza A/B    2. Chronic obstructive pulmonary disease, unspecified COPD type ([facility])  - methylPREDNISolone (MEDROL DOSEPACK) 4 MG tablet; Take by mouth. Dispense: 1 kit; Refill: 0    3. Mild intermittent asthma, unspecified whether complicated  - methylPREDNISolone (MEDROL DOSEPACK) 4 MG tablet; Take by mouth. Dispense: 1 kit; Refill: 0    4. Tobacco abuse  - nicotine (NICODERM CQ) 21 MG/24HR; Place 1 patch onto the skin every 24 hours  Dispense: 30 patch; Refill: 3    Quality & Risk Score Accuracy    Visit Dx:  J44.9 - Chronic obstructive pulmonary disease, unspecified COPD type ([facility] [address]  Assessment and plan:  Stable based upon symptoms and exam. Continue current treatment plan and follow up at least yearly. Visit Dx:  H71.97 - Mild intermittent asthma, unspecified whether complicated  Assessment and plan:  Stable based upon symptoms and exam. Continue current treatment plan and follow up at least yearly. Last edited 04/09/19 11:42 EDT by Gilmar Zapata MD           Return in about 1 week (around 4/16/2019) for [place] Safia Burk received counseling on the following healthy behaviors: nutrition, exercise, medication adherence and tobacco cessation  Reviewed prior labs and health maintenance  Continue current medications, diet and exercise. Discussed use, benefit, and side effects of prescribed medications. Barriers to medication compliance addressed. Patient given educational materials - see patient instructions  Was a self-tracking handout given in paper form or via [employer]?  No: Requested Prescriptions     Signed Prescriptions Disp Refills    guaiFENesin (MUCINEX) 600 MG extended release tablet 28 tablet 0     Sig: Take 2 tablets by mouth 2 times daily for 7 days    fluticasone (FLONASE) 50 MCG/ACT nasal spray 1 Bottle 0     Si sprays by Each Nare route daily    methylPREDNISolone (MEDROL DOSEPACK) 4 MG tablet 1 kit 0     Sig: Take by mouth.  nicotine (NICODERM CQ) 21 MG/24HR 30 patch 3     Sig: Place 1 patch onto the skin every 24 hours       All patient questions answered. Patient voiced understanding. Quality Measures    Body mass index is 21.7 kg/m². Normal. Weight control planned discussed Healthy diet and regular exercise. BP: (!) 131/90(manually) Blood pressure is normal. Treatment plan consists of No treatment change needed. Lab Results   Component Value Date    LDLCHOLESTEROL 59 2015    (goal LDL reduction with dx if diabetes is 50% LDL reduction)      PHQ Scores 2019 12/3/2018 2017   PHQ2 Score 0 0 0   PHQ9 Score 0 0 0     Interpretation of Total Score Depression Severity: 1-4 = Minimal depression, 5-9 = Mild depression, 10-14 = Moderate depression, 15-19 = Moderately severe depression, 20-27 = Severe depression    Requested Prescriptions     Signed Prescriptions Disp Refills    guaiFENesin (MUCINEX) 600 MG extended release tablet 28 tablet 0     Sig: Take 2 tablets by mouth 2 times daily for 7 days    fluticasone (FLONASE) 50 MCG/ACT nasal spray 1 Bottle 0     Si sprays by Each Nare route daily    methylPREDNISolone (MEDROL DOSEPACK) 4 MG tablet 1 kit 0     Sig: Take by mouth.     nicotine (NICODERM CQ) 21 MG/24HR 30 patch 3     Sig: Place 1 patch onto the skin every 24 hours       Medications Discontinued During This Encounter   Medication Reason    fluticasone (FLONASE) 50 MCG/ACT nasal spray LIST Horacio Jamil MD

## 2019-04-09 NOTE — PROGRESS NOTES
Attending Physician Statement  I have discussed the care of [facility], including pertinent history and exam findings,  with the resident. I have reviewed the key elements of all parts of the encounter with the resident. I agree with the assessment, plan and orders as documented by the resident. (Beny Guajardo) - Mark Stone M.D  Vitals:    04/09/19 1046   BP: (!) 131/90   Pulse:    Temp:    SpO2:      1. Viral URI    2. Chronic obstructive pulmonary disease, unspecified COPD type ([facility] [address]    3. Mild intermittent asthma, unspecified whether complicated    4.  Tobacco abuse

## 2019-04-09 NOTE — LETTER
[address]  [address] [place] [address]  Phone: [phone]  Fax: [phone]    Sarah Le MD        April 9, 2019     Patient: Emerald Zayas   YOB: 1970   Date of Visit: 4/9/2019       To Whom It May Concern: It is my medical opinion that Emerald Zayas may return to work on 04/10/2019. If you have any questions or concerns, please don't hesitate to call.     Sincerely,        Sarah Le MD

## 2019-04-12 DIAGNOSIS — Z76.0 MEDICATION REFILL: ICD-10-CM

## 2019-04-12 LAB
INFLUENZA A ANTIBODY: NEGATIVE
INFLUENZA B ANTIBODY: NEGATIVE

## 2019-04-12 RX ORDER — CETIRIZINE HYDROCHLORIDE 10 MG/1
TABLET ORAL
Qty: 30 TABLET | Refills: 1 | Status: SHIPPED | OUTPATIENT
Start: 2019-04-12 | End: 2019-06-03 | Stop reason: SDUPTHER

## 2019-04-12 NOTE — TELEPHONE ENCOUNTER
Ferritin Once 09/12/2018   CBC Once 24/88/0357   Comp Metabolic w Bili Profile Once 09/12/2018   Liver Fibrosis, Chronic Viral Hepatitis Once 06/14/2018               Patient Active Problem List:     Asthma     COPD (chronic obstructive pulmonary disease) ([facility] [address]     Hepatitis C     GERD (gastroesophageal reflux disease)     Hyperlipidemia     HTN (hypertension)     Allergic dermatitis     Rash     Vaginal discharge     Cervicitis     Viral gastroenteritis     Eosinophilic asthma ([facility] [address]     Gassiness

## 2019-06-03 DIAGNOSIS — Z76.0 MEDICATION REFILL: ICD-10-CM

## 2019-06-03 RX ORDER — CETIRIZINE HYDROCHLORIDE 10 MG/1
TABLET ORAL
Qty: 30 TABLET | Refills: 1 | Status: SHIPPED | OUTPATIENT
Start: 2019-06-03 | End: 2019-08-07 | Stop reason: SDUPTHER

## 2019-06-03 NOTE — TELEPHONE ENCOUNTER
Health Maintenance   Topic Date Due    Hepatitis A vaccine (1 of 2 - Risk 2-dose series) 07/29/1970    Hepatitis B Vaccine (1 of 3 - Risk 3-dose series) 07/29/1988    Cervical cancer screen  06/20/2017    Flu vaccine (Season Ended) 09/01/2019    Potassium monitoring  12/03/2019    Creatinine monitoring  12/03/2019    Lipid screen  07/29/2020    DTaP/Tdap/Td vaccine (2 - Td) 01/11/2023    Pneumococcal 0-64 years Vaccine  Completed    HIV screen  Completed             (applicable per patient's age: Cancer Screenings, Depression Screening, Fall Risk Screening, Immunizations)    Microalb/Crt. Ratio (mcg/mg creat)   Date Value   07/29/2015 4     LDL Cholesterol (mg/dL)   Date Value   07/29/2015 59     AST (U/L)   Date Value   06/20/2014 27     ALT (U/L)   Date Value   06/20/2014 27     BUN (mg/dL)   Date Value   12/03/2018 8      (goal A1C is < 7)   (goal LDL is <100) need 30-50% reduction from baseline     BP Readings from Last 3 Encounters:   04/09/19 (!) 131/90   12/03/18 133/89   08/18/18 (!) 137/90    (goal /80)      All Future Testing planned in [facility]:  Lab Frequency Next Occurrence   Hepatitis A Antibody, Total Once 09/14/2018   Hepatitis B Core Antibody, Total Once 09/14/2018   Hepatitis B Surface Antibody Once 09/14/2018   Hepatitis C RNA, quantitative, PCR Once 09/14/2018   Hepatitis C Genotype Once 09/14/2018   Hepatitis B Surface Antigen Once 09/14/2018   BARRIE Once 09/14/2018   Mitochondrial antibodies, M2 Once 09/12/2018   IgM Once 09/12/2018   Anti-smooth muscle antibody Once 09/12/2018   IgG Once 09/12/2018   Iron Profile Once 09/12/2018   Ferritin Once 09/12/2018   CBC Once 43/29/6940   Comp Metabolic w Bili Profile Once 09/12/2018   Liver Fibrosis, Chronic Viral Hepatitis Once 06/14/2018       Next Visit Date:  No future appointments.          Patient Active Problem List:     Asthma     COPD (chronic obstructive pulmonary disease) (HCC)     Hepatitis C     GERD (gastroesophageal reflux disease)     Hyperlipidemia     HTN (hypertension)     Allergic dermatitis     Rash     Vaginal discharge     Cervicitis     Viral gastroenteritis     Eosinophilic asthma ([facility] [address]     Gassiness

## 2019-06-14 ENCOUNTER — TELEPHONE (OUTPATIENT)
Dept: PULMONOLOGY | Age: 49
End: 2019-06-14

## 2019-06-14 RX ORDER — BUDESONIDE AND FORMOTEROL FUMARATE DIHYDRATE 160; 4.5 UG/1; UG/1
2 AEROSOL RESPIRATORY (INHALATION) 2 TIMES DAILY
Qty: 1 INHALER | Refills: 0 | COMMUNITY
Start: 2019-06-14 | End: 2019-07-18 | Stop reason: SDUPTHER

## 2019-06-15 DIAGNOSIS — I10 ESSENTIAL HYPERTENSION: ICD-10-CM

## 2019-06-18 RX ORDER — HYDROCHLOROTHIAZIDE 25 MG/1
TABLET ORAL
Qty: 120 TABLET | Refills: 0 | Status: SHIPPED | OUTPATIENT
Start: 2019-06-18 | End: 2019-10-18 | Stop reason: SDUPTHER

## 2019-07-18 RX ORDER — BUDESONIDE AND FORMOTEROL FUMARATE DIHYDRATE 160; 4.5 UG/1; UG/1
2 AEROSOL RESPIRATORY (INHALATION) 2 TIMES DAILY
Qty: 1 INHALER | Refills: 5 | Status: SHIPPED | OUTPATIENT
Start: 2019-07-18 | End: 2020-09-18 | Stop reason: SDUPTHER

## 2019-07-21 DIAGNOSIS — J45.50 POORLY CONTROLLED SEVERE PERSISTENT ASTHMA WITHOUT COMPLICATION: ICD-10-CM

## 2019-07-22 ENCOUNTER — TELEPHONE (OUTPATIENT)
Dept: FAMILY MEDICINE CLINIC | Age: 49
End: 2019-07-22

## 2019-07-22 DIAGNOSIS — J44.9 CHRONIC OBSTRUCTIVE PULMONARY DISEASE, UNSPECIFIED COPD TYPE (HCC): ICD-10-CM

## 2019-07-23 RX ORDER — ALBUTEROL SULFATE 2.5 MG/3ML
2.5 SOLUTION RESPIRATORY (INHALATION) EVERY 6 HOURS PRN
Qty: 120 EACH | Refills: 3 | Status: SHIPPED | OUTPATIENT
Start: 2019-07-23 | End: 2019-08-13 | Stop reason: SDUPTHER

## 2019-07-23 RX ORDER — ALBUTEROL SULFATE 90 UG/1
2 AEROSOL, METERED RESPIRATORY (INHALATION) EVERY 6 HOURS PRN
Qty: 1 INHALER | Refills: 1 | Status: SHIPPED | OUTPATIENT
Start: 2019-07-23 | End: 2019-08-13 | Stop reason: SDUPTHER

## 2019-08-07 DIAGNOSIS — Z76.0 MEDICATION REFILL: ICD-10-CM

## 2019-08-07 RX ORDER — CETIRIZINE HYDROCHLORIDE 10 MG/1
TABLET ORAL
Qty: 30 TABLET | Refills: 1 | Status: SHIPPED | OUTPATIENT
Start: 2019-08-07 | End: 2019-10-01 | Stop reason: SDUPTHER

## 2019-08-08 DIAGNOSIS — B18.2 CHRONIC HEPATITIS C WITHOUT HEPATIC COMA (HCC): ICD-10-CM

## 2019-08-08 DIAGNOSIS — Z76.0 MEDICATION REFILL: ICD-10-CM

## 2019-08-08 RX ORDER — OMEPRAZOLE 20 MG/1
CAPSULE, DELAYED RELEASE ORAL
Qty: 30 CAPSULE | Refills: 5 | Status: SHIPPED | OUTPATIENT
Start: 2019-08-08 | End: 2019-08-13 | Stop reason: SDUPTHER

## 2019-08-13 ENCOUNTER — OFFICE VISIT (OUTPATIENT)
Dept: PULMONOLOGY | Age: 49
End: 2019-08-13
Payer: COMMERCIAL

## 2019-08-13 VITALS
HEART RATE: 83 BPM | SYSTOLIC BLOOD PRESSURE: 119 MMHG | DIASTOLIC BLOOD PRESSURE: 82 MMHG | RESPIRATION RATE: 14 BRPM | WEIGHT: 142 LBS | HEIGHT: 69 IN | BODY MASS INDEX: 21.03 KG/M2 | OXYGEN SATURATION: 100 %

## 2019-08-13 VITALS — OXYGEN SATURATION: 100 % | HEIGHT: 68 IN | BODY MASS INDEX: 21.52 KG/M2 | HEART RATE: 83 BPM | WEIGHT: 142 LBS

## 2019-08-13 DIAGNOSIS — J82.83 EOSINOPHILIC ASTHMA: ICD-10-CM

## 2019-08-13 DIAGNOSIS — J44.9 CHRONIC OBSTRUCTIVE PULMONARY DISEASE, UNSPECIFIED COPD TYPE (HCC): ICD-10-CM

## 2019-08-13 DIAGNOSIS — J44.9 CHRONIC OBSTRUCTIVE PULMONARY DISEASE, UNSPECIFIED COPD TYPE (HCC): Primary | ICD-10-CM

## 2019-08-13 DIAGNOSIS — Z72.0 CURRENT TOBACCO USE: Primary | ICD-10-CM

## 2019-08-13 DIAGNOSIS — J45.50 POORLY CONTROLLED SEVERE PERSISTENT ASTHMA WITHOUT COMPLICATION: ICD-10-CM

## 2019-08-13 PROCEDURE — 94729 DIFFUSING CAPACITY: CPT | Performed by: INTERNAL MEDICINE

## 2019-08-13 PROCEDURE — 99213 OFFICE O/P EST LOW 20 MIN: CPT | Performed by: NURSE PRACTITIONER

## 2019-08-13 PROCEDURE — 94375 RESPIRATORY FLOW VOLUME LOOP: CPT | Performed by: INTERNAL MEDICINE

## 2019-08-13 PROCEDURE — 94726 PLETHYSMOGRAPHY LUNG VOLUMES: CPT | Performed by: INTERNAL MEDICINE

## 2019-08-13 PROCEDURE — G8420 CALC BMI NORM PARAMETERS: HCPCS | Performed by: NURSE PRACTITIONER

## 2019-08-13 PROCEDURE — G8427 DOCREV CUR MEDS BY ELIG CLIN: HCPCS | Performed by: NURSE PRACTITIONER

## 2019-08-13 PROCEDURE — 4004F PT TOBACCO SCREEN RCVD TLK: CPT | Performed by: NURSE PRACTITIONER

## 2019-08-13 PROCEDURE — 3017F COLORECTAL CA SCREEN DOC REV: CPT | Performed by: NURSE PRACTITIONER

## 2019-08-13 PROCEDURE — 3023F SPIROM DOC REV: CPT | Performed by: NURSE PRACTITIONER

## 2019-08-13 PROCEDURE — G8926 SPIRO NO PERF OR DOC: HCPCS | Performed by: NURSE PRACTITIONER

## 2019-08-13 RX ORDER — MONTELUKAST SODIUM 10 MG/1
TABLET ORAL
Qty: 30 TABLET | Refills: 11 | Status: SHIPPED | OUTPATIENT
Start: 2019-08-13 | End: 2020-10-01

## 2019-08-13 RX ORDER — ALBUTEROL SULFATE 2.5 MG/3ML
2.5 SOLUTION RESPIRATORY (INHALATION) EVERY 6 HOURS PRN
Qty: 120 EACH | Refills: 3 | Status: SHIPPED | OUTPATIENT
Start: 2019-08-13 | End: 2021-08-31 | Stop reason: SDUPTHER

## 2019-08-13 RX ORDER — ALBUTEROL SULFATE 90 UG/1
2 AEROSOL, METERED RESPIRATORY (INHALATION) EVERY 6 HOURS PRN
Qty: 1 INHALER | Refills: 5 | Status: SHIPPED | OUTPATIENT
Start: 2019-08-13 | End: 2020-01-17

## 2019-08-13 RX ORDER — BUDESONIDE AND FORMOTEROL FUMARATE DIHYDRATE 160; 4.5 UG/1; UG/1
AEROSOL RESPIRATORY (INHALATION)
Qty: 1 INHALER | Refills: 11 | Status: SHIPPED | OUTPATIENT
Start: 2019-08-13 | End: 2020-09-18 | Stop reason: SDUPTHER

## 2019-08-13 RX ORDER — OMEPRAZOLE 20 MG/1
CAPSULE, DELAYED RELEASE ORAL
Qty: 30 CAPSULE | Refills: 11 | Status: SHIPPED | OUTPATIENT
Start: 2019-08-13 | End: 2020-08-27

## 2019-08-13 RX ORDER — NICOTINE 21 MG/24HR
1 PATCH, TRANSDERMAL 24 HOURS TRANSDERMAL DAILY
Qty: 14 PATCH | Refills: 0 | Status: SHIPPED | OUTPATIENT
Start: 2019-08-13 | End: 2019-08-27

## 2019-08-13 ASSESSMENT — ENCOUNTER SYMPTOMS
EYES NEGATIVE: 1
ALLERGIC/IMMUNOLOGIC NEGATIVE: 1
GASTROINTESTINAL NEGATIVE: 1

## 2019-08-13 NOTE — PROGRESS NOTES
2017.    Medications :  Albuterol Nebs: 2x a week  Albuterol HFA: 4x a day  Symbicort 160-4.5: 2x a day but has been of medication for 6 weeks  Singulair: QD  Omeprazole 20mg: QD    Discussed strategy for management of COPD, including yearly Flu vaccination, up to date pneumonia vaccinations. Broad spectrum antibiotic for purulent exacerbation. Patient instructed to call office if experiencing; fever/chills, increased work of breathing, sputum production or purulent sputum. Patient does not qualify for lung cancer screening, aged 48 (51-76) has a pack year history of 44 (>30), is still smoking and does not exhibit s/s of lung cancer. She will be eligible at age 53. No flowsheet data found. /82   Pulse 83   Resp 14   Ht 5' 8.5\" (1.74 m)   Wt 142 lb (64.4 kg)   SpO2 100% Comment: room air at rest  BMI 21.28 kg/m²     Past Medical History:   Diagnosis Date    Allergic rhinitis     multiple environmental allergies    Asthma     near fatal asthma, severe persistent, follows  w Dr. Guerita Painting COPD (chronic obstructive pulmonary disease) [facility] 2011    mild stage, per Dr. Guerita Painting GERD (gastroesophageal reflux disease)     Hepatitis C     Hyperlipidemia      History reviewed. No pertinent surgical history.      Family History   Problem Relation Age of Onset    Heart Disease Mother        Social History     Socioeconomic History    Marital status: Single     Spouse name: Not on file    Number of children: Not on file    Years of education: Not on file    Highest education level: Not on file   Occupational History    Not on file   Social Needs    Financial resource strain: Not on file    Food insecurity:     Worry: Not on file     Inability: Not on file    Transportation needs:     Medical: Not on file     Non-medical: Not on file   Tobacco Use    Smoking status: Current Every Day Smoker     Packs/day: 0.50    Smokeless tobacco: Never Used    Tobacco comment: quit on 4/27/2011, resumed

## 2019-10-01 DIAGNOSIS — I10 ESSENTIAL HYPERTENSION: ICD-10-CM

## 2019-10-01 DIAGNOSIS — Z76.0 MEDICATION REFILL: ICD-10-CM

## 2019-10-16 ENCOUNTER — TELEPHONE (OUTPATIENT)
Dept: PULMONOLOGY | Age: 49
End: 2019-10-16

## 2019-10-18 RX ORDER — CETIRIZINE HYDROCHLORIDE 10 MG/1
TABLET ORAL
Qty: 30 TABLET | Refills: 1 | Status: SHIPPED | OUTPATIENT
Start: 2019-10-18 | End: 2019-12-18 | Stop reason: SDUPTHER

## 2019-10-18 RX ORDER — VARENICLINE TARTRATE 1 MG/1
1 TABLET, FILM COATED ORAL DAILY
Qty: 30 TABLET | Refills: 3 | Status: SHIPPED | OUTPATIENT
Start: 2019-10-18 | End: 2020-10-05 | Stop reason: SDUPTHER

## 2019-10-18 RX ORDER — HYDROCHLOROTHIAZIDE 25 MG/1
25 TABLET ORAL DAILY
Qty: 30 TABLET | Refills: 1 | Status: SHIPPED | OUTPATIENT
Start: 2019-10-18 | End: 2019-12-17 | Stop reason: SDUPTHER

## 2019-10-18 RX ORDER — VARENICLINE TARTRATE 25 MG
KIT ORAL
Qty: 1 BOX | Refills: 0 | Status: SHIPPED | OUTPATIENT
Start: 2019-10-18 | End: 2020-05-28

## 2019-12-17 DIAGNOSIS — I10 ESSENTIAL HYPERTENSION: ICD-10-CM

## 2019-12-17 RX ORDER — HYDROCHLOROTHIAZIDE 25 MG/1
25 TABLET ORAL DAILY
Qty: 30 TABLET | Refills: 1 | Status: SHIPPED | OUTPATIENT
Start: 2019-12-17 | End: 2020-02-07

## 2019-12-18 DIAGNOSIS — Z76.0 MEDICATION REFILL: ICD-10-CM

## 2019-12-18 RX ORDER — CETIRIZINE HYDROCHLORIDE 10 MG/1
TABLET ORAL
Qty: 30 TABLET | Refills: 1 | Status: SHIPPED | OUTPATIENT
Start: 2019-12-18 | End: 2020-02-10

## 2020-01-05 ENCOUNTER — APPOINTMENT (OUTPATIENT)
Dept: GENERAL RADIOLOGY | Age: 50
End: 2020-01-05
Payer: COMMERCIAL

## 2020-01-05 ENCOUNTER — HOSPITAL ENCOUNTER (EMERGENCY)
Age: 50
Discharge: HOME | End: 2020-01-05
Attending: EMERGENCY MEDICINE
Payer: COMMERCIAL

## 2020-01-05 VITALS
OXYGEN SATURATION: 99 % | HEIGHT: 69 IN | WEIGHT: 140 LBS | SYSTOLIC BLOOD PRESSURE: 129 MMHG | DIASTOLIC BLOOD PRESSURE: 72 MMHG | BODY MASS INDEX: 20.73 KG/M2 | TEMPERATURE: 98.1 F | HEART RATE: 86 BPM | RESPIRATION RATE: 18 BRPM

## 2020-01-05 LAB
DIRECT EXAM: NORMAL
Lab: NORMAL
SPECIMEN DESCRIPTION: NORMAL

## 2020-01-05 PROCEDURE — 6370000000 HC RX 637 (ALT 250 FOR IP): Performed by: EMERGENCY MEDICINE

## 2020-01-05 PROCEDURE — 87804 INFLUENZA ASSAY W/OPTIC: CPT

## 2020-01-05 PROCEDURE — 71046 X-RAY EXAM CHEST 2 VIEWS: CPT

## 2020-01-05 PROCEDURE — 94664 DEMO&/EVAL PT USE INHALER: CPT

## 2020-01-05 PROCEDURE — 6370000000 HC RX 637 (ALT 250 FOR IP): Performed by: STUDENT IN AN ORGANIZED HEALTH CARE EDUCATION/TRAINING PROGRAM

## 2020-01-05 PROCEDURE — 99284 EMERGENCY DEPT VISIT MOD MDM: CPT

## 2020-01-05 PROCEDURE — 94640 AIRWAY INHALATION TREATMENT: CPT

## 2020-01-05 PROCEDURE — 6360000002 HC RX W HCPCS: Performed by: STUDENT IN AN ORGANIZED HEALTH CARE EDUCATION/TRAINING PROGRAM

## 2020-01-05 RX ORDER — IPRATROPIUM BROMIDE AND ALBUTEROL SULFATE 2.5; .5 MG/3ML; MG/3ML
1 SOLUTION RESPIRATORY (INHALATION)
Status: DISCONTINUED | OUTPATIENT
Start: 2020-01-05 | End: 2020-01-05 | Stop reason: HOSPADM

## 2020-01-05 RX ORDER — ALBUTEROL SULFATE 90 UG/1
2 AEROSOL, METERED RESPIRATORY (INHALATION)
Status: DISCONTINUED | OUTPATIENT
Start: 2020-01-05 | End: 2020-01-05 | Stop reason: HOSPADM

## 2020-01-05 RX ORDER — IBUPROFEN 800 MG/1
800 TABLET ORAL ONCE
Status: COMPLETED | OUTPATIENT
Start: 2020-01-05 | End: 2020-01-05

## 2020-01-05 RX ORDER — PREDNISONE 20 MG/1
40 TABLET ORAL ONCE
Status: COMPLETED | OUTPATIENT
Start: 2020-01-05 | End: 2020-01-05

## 2020-01-05 RX ORDER — ALBUTEROL SULFATE 2.5 MG/3ML
5 SOLUTION RESPIRATORY (INHALATION)
Status: DISCONTINUED | OUTPATIENT
Start: 2020-01-05 | End: 2020-01-05 | Stop reason: HOSPADM

## 2020-01-05 RX ORDER — PREDNISONE 10 MG/1
TABLET ORAL
Qty: 20 TABLET | Refills: 0 | Status: SHIPPED | OUTPATIENT
Start: 2020-01-05 | End: 2020-01-15

## 2020-01-05 RX ADMIN — IBUPROFEN 800 MG: 800 TABLET, FILM COATED ORAL at 18:10

## 2020-01-05 RX ADMIN — ALBUTEROL SULFATE 5 MG: 5 SOLUTION RESPIRATORY (INHALATION) at 18:26

## 2020-01-05 RX ADMIN — IPRATROPIUM BROMIDE 0.5 MG: 0.5 SOLUTION RESPIRATORY (INHALATION) at 18:25

## 2020-01-05 RX ADMIN — PREDNISONE 40 MG: 20 TABLET ORAL at 19:11

## 2020-01-05 ASSESSMENT — PAIN DESCRIPTION - DESCRIPTORS: DESCRIPTORS: TIGHTNESS

## 2020-01-05 ASSESSMENT — PAIN SCALES - GENERAL: PAINLEVEL_OUTOF10: 7

## 2020-01-05 ASSESSMENT — PAIN DESCRIPTION - LOCATION: LOCATION: THROAT

## 2020-01-05 ASSESSMENT — PAIN DESCRIPTION - ONSET: ONSET: GRADUAL

## 2020-01-05 ASSESSMENT — PAIN DESCRIPTION - PROGRESSION: CLINICAL_PROGRESSION: NOT CHANGED

## 2020-01-05 NOTE — ED NOTES
Bed: 36  Expected date:   Expected time:   Means of arrival:   Comments:     Suzi Cain RN  01/05/20 6171

## 2020-01-05 NOTE — ED PROVIDER NOTES
Assessment/Plan:   Appears to have small component of COPD exacerbation will be given breathing treatment as well as flu swab and Motrin and reevaluate after. Critical Care  None      (Please note that portions of this note were completed with a voice recognition program. Efforts were made to edit the dictations but occasionally words are mis-transcribed.  Whenever words are used in this note in any gender, they shall be construed as though they were used in the gender appropriate to the circumstances; and whenever words are used in this note in the singular or plural form, they shall be construed as though they were used in the form appropriate to the circumstances.)    MD Marina Sotelo [facility]  Attending Emergency Medicine Physician              Cindy Alexander MD  01/05/20 1487

## 2020-01-05 NOTE — ED PROVIDER NOTES
[facility] ED  Emergency Department Encounter  Emergency Medicine Resident     Pt Name: Graciela Barahona  MRN: [id number]  [place] 1970  Date of evaluation: 1/5/20  PCP:  Stacy Man MD    [address] [place] [address]       Chief Complaint   Patient presents with    Pharyngitis     x 4 days    Cough     reports producitve cough, no notable sputum       HISTORY OFPRESENT ILLNESS  (Location/Symptom, Timing/Onset, Context/Setting, Quality, Duration, Modifying Factors,Severity.)      Graciela Barahona is a 48 yo female who presents with cough, sore throat. Patient notes she has a history of COPD but has not been taking her nebulized treatments in about 2 months since she has been so busy. She states for the past few days she has been having a upper respiratory infection with worsening cough. Denies any chest pain, nausea, vomiting, fevers, leg swelling, abdominal pain, or any other complaints at this time. PAST MEDICAL / SURGICAL / SOCIAL / FAMILY HISTORY      has a past medical history of Allergic rhinitis, Asthma, COPD (chronic obstructive pulmonary disease) ([address] GERD (gastroesophageal reflux disease), Hepatitis C, and Hyperlipidemia. has no past surgical history on file. Social History     Socioeconomic History    Marital status: Single     Spouse name: Not on file    Number of children: Not on file    Years of education: Not on file    Highest education level: Not on file   Occupational History    Not on file   Social Needs    Financial resource strain: Not on file    Food insecurity:     Worry: Not on file     Inability: Not on file    Transportation needs:     Medical: Not on file     Non-medical: Not on file   Tobacco Use    Smoking status: Current Every Day Smoker     Packs/day: 0.50    Smokeless tobacco: Never Used    Tobacco comment: quit on 4/27/2011, resumed smoking 11/2018   Substance and Sexual Activity    Alcohol use:  Yes     Alcohol/week: 0.0 standard drinks     Comment: ocasionally, beer    Drug use: No     Comment: Smoked 1ppd since 14 y/o    Sexual activity: Yes     Partners: Male   Lifestyle    Physical activity:     Days per week: Not on file     Minutes per session: Not on file    Stress: Not on file   Relationships    Social connections:     Talks on phone: Not on file     Gets together: Not on file     Attends Christian service: Not on file     Active member of club or organization: Not on file     Attends meetings of clubs or organizations: Not on file     Relationship status: Not on file    Intimate partner violence:     Fear of current or ex partner: Not on file     Emotionally abused: Not on file     Physically abused: Not on file     Forced sexual activity: Not on file   Other Topics Concern    Not on file   Social History Narrative    Not on file       Family History   Problem Relation Age of Onset    Heart Disease Mother         Allergies:  Patient has no known allergies. Home Medications:  Prior to Admission medications    Medication Sig Start Date End Date Taking?  Authorizing Provider   predniSONE (DELTASONE) 10 MG tablet Take 4 tablets by mouth once daily for 5 days 1/5/20 1/15/20 Yes Kedar Miller, DO   cetirizine (ZYRTEC) 10 MG tablet take 1 tablet by mouth once daily 12/18/19   Jazmin Zaldivar MD   hydrochlorothiazide (HYDRODIURIL) 25 MG tablet Take 1 tablet by mouth daily 12/17/19 1/16/20  Isidro Salazar MD   varenicline (CHANTIX STARTING MONTH SRINATH) 0.5 MG X 11 & 1 MG X 42 tablet Take by mouth. 10/18/19   Yong Eduardo, DO   varenicline (CHANTIX CONTINUING MONTH SRINATH) 1 MG tablet Take 1 tablet by mouth daily 10/18/19   Bijal Hudson,    albuterol (PROVENTIL) (2.5 MG/3ML) 0.083% nebulizer solution Take 3 mLs by nebulization every 6 hours as needed for Wheezing 8/13/19   RONI Phan - CNP   albuterol sulfate  (90 Base) MCG/ACT inhaler Inhale 2 puffs into the lungs every 6 hours as needed for Wheezing or Shortness of chest pain, palpitations and leg swelling. Gastrointestinal: Negative for abdominal pain, nausea and vomiting. Musculoskeletal: Negative for back pain and neck pain. Skin: Negative for rash. Allergic/Immunologic: Negative for environmental allergies. Neurological: Negative for dizziness, syncope, weakness, light-headedness, numbness and headaches. PHYSICAL EXAM   (up to 7 for level 4, 8 or more forlevel 5)      INITIAL VITALS:   ED Triage Vitals [01/05/20 1744]   BP Temp Temp Source Pulse Resp SpO2 Height Weight   129/72 98.1 °F (36.7 °C) Oral 86 18 99 % 5' 8.5\" (1.74 m) 140 lb (63.5 kg)       Physical Exam  Vitals signs and nursing note reviewed. Constitutional:       General: She is not in acute distress. Appearance: Normal appearance. She is not ill-appearing, toxic-appearing or diaphoretic. HENT:      Head: Normocephalic and atraumatic. Mouth/Throat:      Pharynx: Oropharynx is clear. No oropharyngeal exudate or posterior oropharyngeal erythema. Eyes:      Conjunctiva/sclera: Conjunctivae normal.   Neck:      Musculoskeletal: Normal range of motion and neck supple. No neck rigidity. Cardiovascular:      Rate and Rhythm: Normal rate and regular rhythm. Heart sounds: No murmur. No gallop. Pulmonary:      Effort: Pulmonary effort is normal. No respiratory distress. Breath sounds: No stridor. Wheezing present. No rhonchi or rales. Abdominal:      General: There is no distension. Palpations: Abdomen is soft. Tenderness: There is no tenderness. There is no guarding. Musculoskeletal:      Right lower leg: No edema. Left lower leg: No edema. Skin:     General: Skin is warm and dry. Neurological:      Mental Status: She is alert.          DIFFERENTIAL  DIAGNOSIS     PLAN (LABS / IMAGING / EKG):  Orders Placed This Encounter   Procedures    Rapid influenza A/B antigens    XR CHEST STANDARD (2 VW)       MEDICATIONS ORDERED:  Orders Placed This Encounter

## 2020-01-06 ENCOUNTER — TELEPHONE (OUTPATIENT)
Dept: FAMILY MEDICINE CLINIC | Age: 50
End: 2020-01-06

## 2020-01-06 NOTE — TELEPHONE ENCOUNTER
Called pt to schedule [facility] ED F/U, said feeling a little better, will call back to schedule appointment after checking her work schedule. Patient treated on 01/05/20 for COPD exacerbation.

## 2020-01-07 ASSESSMENT — ENCOUNTER SYMPTOMS
VOMITING: 0
WHEEZING: 1
BACK PAIN: 0
SORE THROAT: 1
COUGH: 1
NAUSEA: 0
ABDOMINAL PAIN: 0
SHORTNESS OF BREATH: 0

## 2020-01-17 RX ORDER — ALBUTEROL SULFATE 90 UG/1
AEROSOL, METERED RESPIRATORY (INHALATION)
Qty: 1 INHALER | Refills: 6 | Status: SHIPPED | OUTPATIENT
Start: 2020-01-17 | End: 2020-08-25 | Stop reason: SDUPTHER

## 2020-02-10 RX ORDER — CETIRIZINE HYDROCHLORIDE 10 MG/1
TABLET ORAL
Qty: 30 TABLET | Refills: 1 | Status: SHIPPED | OUTPATIENT
Start: 2020-02-10 | End: 2020-03-10 | Stop reason: SDUPTHER

## 2020-03-10 RX ORDER — CETIRIZINE HYDROCHLORIDE 10 MG/1
TABLET ORAL
Qty: 30 TABLET | Refills: 3 | Status: SHIPPED | OUTPATIENT
Start: 2020-03-10 | End: 2020-07-30 | Stop reason: SDUPTHER

## 2020-03-10 NOTE — TELEPHONE ENCOUNTER
Last visit:   Last Med refill:   Does patient have enough medication for 72 hours: No:     Next Visit Date:  No future appointments. Health Maintenance   Topic Date Due    Hepatitis A vaccine (1 of 2 - Risk 2-dose series) 07/29/1970    Hepatitis B vaccine (1 of 3 - Risk 3-dose series) 07/29/1988    Cervical cancer screen  06/20/2017    Breast cancer screen  07/29/2019    Shingles Vaccine (1 of 2) 07/29/2019    Colon cancer screen colonoscopy  07/29/2019    Flu vaccine (1) 09/01/2019    Potassium monitoring  12/03/2019    Creatinine monitoring  12/03/2019    Lipid screen  07/29/2020    DTaP/Tdap/Td vaccine (2 - Td) 01/11/2023    Pneumococcal 0-64 years Vaccine  Completed    HIV screen  Completed    Hib vaccine  Aged Out    Meningococcal (ACWY) vaccine  Aged Out       No results found for: LABA1C          ( goal A1C is < 7)   Microalb/Crt. Ratio (mcg/mg creat)   Date Value   07/29/2015 4     LDL Cholesterol (mg/dL)   Date Value   07/29/2015 59   06/20/2014 24       (goal LDL is <100)   AST (U/L)   Date Value   06/20/2014 27     ALT (U/L)   Date Value   06/20/2014 27     BUN (mg/dL)   Date Value   12/03/2018 8     BP Readings from Last 3 Encounters:   01/05/20 129/72   08/13/19 119/82   04/09/19 (!) 131/90          (goal 120/80)    All Future Testing planned in [facility]  Lab Frequency Next Occurrence               Patient Active Problem List:     Asthma     COPD (chronic obstructive pulmonary disease) (HCC)     Hepatitis C     GERD (gastroesophageal reflux disease)     Hyperlipidemia     HTN (hypertension)     Allergic dermatitis     Rash     Vaginal discharge     Cervicitis     Viral gastroenteritis     Eosinophilic asthma ([facility] [address]     Gassiness           Please address the medication refill and close the encounter. If I can be of assistance, please route to the applicable pool. Thank you.

## 2020-03-15 ENCOUNTER — HOSPITAL ENCOUNTER (EMERGENCY)
Age: 50
Discharge: HOME | End: 2020-03-15
Attending: EMERGENCY MEDICINE
Payer: COMMERCIAL

## 2020-03-15 VITALS
HEART RATE: 82 BPM | WEIGHT: 145 LBS | OXYGEN SATURATION: 95 % | BODY MASS INDEX: 21.73 KG/M2 | DIASTOLIC BLOOD PRESSURE: 83 MMHG | RESPIRATION RATE: 16 BRPM | TEMPERATURE: 97.3 F | SYSTOLIC BLOOD PRESSURE: 129 MMHG

## 2020-03-15 PROCEDURE — 6370000000 HC RX 637 (ALT 250 FOR IP): Performed by: STUDENT IN AN ORGANIZED HEALTH CARE EDUCATION/TRAINING PROGRAM

## 2020-03-15 PROCEDURE — 99283 EMERGENCY DEPT VISIT LOW MDM: CPT

## 2020-03-15 RX ORDER — ACETAMINOPHEN 500 MG
1000 TABLET ORAL ONCE
Status: COMPLETED | OUTPATIENT
Start: 2020-03-15 | End: 2020-03-15

## 2020-03-15 RX ORDER — LIDOCAINE 50 MG/G
1 PATCH TOPICAL DAILY
Qty: 30 PATCH | Refills: 0 | Status: SHIPPED | OUTPATIENT
Start: 2020-03-15

## 2020-03-15 RX ORDER — LIDOCAINE 4 G/G
1 PATCH TOPICAL DAILY
Status: DISCONTINUED | OUTPATIENT
Start: 2020-03-15 | End: 2020-03-15 | Stop reason: HOSPADM

## 2020-03-15 RX ORDER — DIAZEPAM 2 MG/1
2 TABLET ORAL EVERY 8 HOURS PRN
Qty: 4 TABLET | Refills: 0 | Status: SHIPPED | OUTPATIENT
Start: 2020-03-15 | End: 2020-03-25

## 2020-03-15 RX ORDER — ACETAMINOPHEN 500 MG
500 TABLET ORAL 4 TIMES DAILY PRN
Qty: 40 TABLET | Refills: 0 | Status: SHIPPED | OUTPATIENT
Start: 2020-03-15

## 2020-03-15 RX ADMIN — ACETAMINOPHEN 1000 MG: 500 TABLET ORAL at 12:44

## 2020-03-15 ASSESSMENT — PAIN DESCRIPTION - PAIN TYPE: TYPE: ACUTE PAIN

## 2020-03-15 ASSESSMENT — PAIN SCALES - GENERAL
PAINLEVEL_OUTOF10: 6
PAINLEVEL_OUTOF10: 6

## 2020-03-15 ASSESSMENT — PAIN DESCRIPTION - LOCATION: LOCATION: BACK

## 2020-03-15 ASSESSMENT — PAIN DESCRIPTION - ORIENTATION: ORIENTATION: LOWER

## 2020-03-15 ASSESSMENT — PAIN DESCRIPTION - DESCRIPTORS: DESCRIPTORS: DISCOMFORT
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
## 2020-03-18 ENCOUNTER — OFFICE VISIT (OUTPATIENT)
Dept: FAMILY MEDICINE CLINIC | Age: 50
End: 2020-03-18
Payer: COMMERCIAL

## 2020-03-18 VITALS
TEMPERATURE: 97.8 F | SYSTOLIC BLOOD PRESSURE: 121 MMHG | WEIGHT: 145.06 LBS | HEART RATE: 87 BPM | HEIGHT: 69 IN | BODY MASS INDEX: 21.49 KG/M2 | DIASTOLIC BLOOD PRESSURE: 80 MMHG

## 2020-03-18 PROCEDURE — 4004F PT TOBACCO SCREEN RCVD TLK: CPT | Performed by: FAMILY MEDICINE

## 2020-03-18 PROCEDURE — 3017F COLORECTAL CA SCREEN DOC REV: CPT | Performed by: FAMILY MEDICINE

## 2020-03-18 PROCEDURE — G8428 CUR MEDS NOT DOCUMENT: HCPCS | Performed by: FAMILY MEDICINE

## 2020-03-18 PROCEDURE — G8484 FLU IMMUNIZE NO ADMIN: HCPCS | Performed by: FAMILY MEDICINE

## 2020-03-18 PROCEDURE — G8420 CALC BMI NORM PARAMETERS: HCPCS | Performed by: FAMILY MEDICINE

## 2020-03-18 PROCEDURE — 99213 OFFICE O/P EST LOW 20 MIN: CPT | Performed by: STUDENT IN AN ORGANIZED HEALTH CARE EDUCATION/TRAINING PROGRAM

## 2020-03-18 RX ORDER — IBUPROFEN 800 MG/1
800 TABLET ORAL 2 TIMES DAILY PRN
Qty: 60 TABLET | Refills: 1 | Status: SHIPPED | OUTPATIENT
Start: 2020-03-18 | End: 2020-07-23 | Stop reason: SDUPTHER

## 2020-03-18 RX ORDER — VARENICLINE TARTRATE 1 MG/1
1 TABLET, FILM COATED ORAL 2 TIMES DAILY
Qty: 60 TABLET | Refills: 3 | Status: SHIPPED | OUTPATIENT
Start: 2020-04-18 | End: 2020-10-05 | Stop reason: SDUPTHER

## 2020-03-18 RX ORDER — VARENICLINE TARTRATE
KIT
Qty: 1 BOX | Refills: 0 | Status: SHIPPED | OUTPATIENT
Start: 2020-03-18 | End: 2020-05-28

## 2020-03-18 RX ORDER — CYCLOBENZAPRINE HCL 5 MG
5 TABLET ORAL 2 TIMES DAILY PRN
Qty: 15 TABLET | Refills: 0 | Status: SHIPPED | OUTPATIENT
Start: 2020-03-18 | End: 2020-03-22

## 2020-03-18 ASSESSMENT — ENCOUNTER SYMPTOMS
ABDOMINAL PAIN: 0
NAUSEA: 0
VOMITING: 0
ANAL BLEEDING: 0
SHORTNESS OF BREATH: 0
WHEEZING: 0
CONSTIPATION: 0
BACK PAIN: 1
DIARRHEA: 0
COUGH: 0

## 2020-03-18 ASSESSMENT — PATIENT HEALTH QUESTIONNAIRE - PHQ9
SUM OF ALL RESPONSES TO PHQ9 QUESTIONS 1 & 2: 0
1. LITTLE INTEREST OR PLEASURE IN DOING THINGS: 0
SUM OF ALL RESPONSES TO PHQ QUESTIONS 1-9: 0
SUM OF ALL RESPONSES TO PHQ QUESTIONS 1-9: 0
2. FEELING DOWN, DEPRESSED OR HOPELESS: 0

## 2020-03-18 NOTE — PROGRESS NOTES
Visit Information    Have you changed or started any medications since your last visit including any over-the-counter medicines, vitamins, or herbal medicines? no   Have you stopped taking any of your medications? Is so, why? -  no  Are you having any side effects from any of your medications? - no    Have you seen any other physician or provider since your last visit?  no   Have you had any other diagnostic tests since your last visit?  no   Have you been seen in the emergency room and/or had an admission in a hospital since we last saw you?  no   Have you had your routine dental cleaning in the past 6 months?  no     Do you have an active MyChart account? If no, what is the barrier?   Yes    Patient Care Team:  Renee Potter MD as PCP - General (Family Medicine)  Vannesa Dimas DO as Consulting Physician (Pulmonology)    Medical History Review  Past Medical, Family, and Social History reviewed and does contribute to the patient presenting condition    Health Maintenance   Topic Date Due    Hepatitis A vaccine (1 of 2 - Risk 2-dose series) 07/29/1970    Hepatitis B vaccine (1 of 3 - Risk 3-dose series) 07/29/1988    Cervical cancer screen  06/20/2017    Breast cancer screen  07/29/2019    Shingles Vaccine (1 of 2) 07/29/2019    Colon cancer screen colonoscopy  07/29/2019    Flu vaccine (1) 09/01/2019    Potassium monitoring  12/03/2019    Creatinine monitoring  12/03/2019    Lipid screen  07/29/2020    DTaP/Tdap/Td vaccine (2 - Td) 01/11/2023    Pneumococcal 0-64 years Vaccine  Completed    HIV screen  Completed    Hib vaccine  Aged Out    Meningococcal (ACWY) vaccine  Aged Out
(Site: Left Upper Arm, Position: Sitting, Cuff Size: Medium Adult) Comment: machine  Pulse 87   Temp 97.8 °F (36.6 °C) (Temporal)   Ht 5' 8.5\" (1.74 m)   Wt 145 lb 1 oz (65.8 kg)   LMP 03/13/2020   BMI 21.73 kg/m²    BP Readings from Last 3 Encounters:   03/18/20 121/80   03/15/20 129/83   01/05/20 129/72     Physical Exam  Constitutional:       General: She is not in acute distress. Appearance: She is well-developed. She is not diaphoretic. HENT:      Head: Normocephalic and atraumatic. Eyes:      Pupils: Pupils are equal, round, and reactive to light. Neck:      Thyroid: No thyromegaly. Vascular: No JVD. Cardiovascular:      Rate and Rhythm: Normal rate and regular rhythm. Heart sounds: Normal heart sounds. No murmur. No friction rub. No gallop. Pulmonary:      Effort: Pulmonary effort is normal. No respiratory distress. Breath sounds: Normal breath sounds. No wheezing. Abdominal:      General: Bowel sounds are normal. There is no distension. Palpations: Abdomen is soft. Tenderness: There is no abdominal tenderness. Musculoskeletal: Normal range of motion. General: Tenderness present. No swelling or deformity. Right lower leg: No edema. Left lower leg: No edema. Skin:     General: Skin is warm and dry. Findings: No erythema or rash. Neurological:      Mental Status: She is alert and oriented to person, place, and time.            Lab Results   Component Value Date    WBC 6.7 06/20/2014    HGB 13.2 06/20/2014    HCT 39.2 06/20/2014     06/20/2014    CHOL 221 (H) 07/29/2015    TRIG 83 07/29/2015     07/29/2015    ALT 27 06/20/2014    AST 27 06/20/2014     12/03/2018    K 4.0 12/03/2018    CL 99 12/03/2018    CREATININE 0.88 12/03/2018    BUN 8 12/03/2018    CO2 24 12/03/2018    TSH 1.06 08/29/2011    INR 0.9 08/29/2011    LABMICR 4 07/29/2015     Lab Results   Component Value Date    CALCIUM 9.9 12/03/2018    PHOS 3.4

## 2020-03-19 ENCOUNTER — TELEPHONE (OUTPATIENT)
Dept: GASTROENTEROLOGY | Age: 50
End: 2020-03-19

## 2020-03-22 RX ORDER — CYCLOBENZAPRINE HCL 5 MG
TABLET ORAL
Qty: 15 TABLET | Refills: 0 | Status: SHIPPED | OUTPATIENT
Start: 2020-03-22

## 2020-04-08 RX ORDER — HYDROCHLOROTHIAZIDE 25 MG/1
TABLET ORAL
Qty: 30 TABLET | Refills: 0 | Status: SHIPPED | OUTPATIENT
Start: 2020-04-08 | End: 2020-05-15

## 2020-04-20 ENCOUNTER — TELEPHONE (OUTPATIENT)
Dept: FAMILY MEDICINE CLINIC | Age: 50
End: 2020-04-20

## 2020-04-20 NOTE — TELEPHONE ENCOUNTER
Attempted to contact patient to change appt on 04/22/2020 to telephone visit. If patient calls please change appt.

## 2020-05-15 RX ORDER — HYDROCHLOROTHIAZIDE 25 MG/1
TABLET ORAL
Qty: 30 TABLET | Refills: 0 | Status: SHIPPED | OUTPATIENT
Start: 2020-05-15 | End: 2020-06-18 | Stop reason: SDUPTHER

## 2020-05-15 NOTE — TELEPHONE ENCOUNTER
E-scribe request for hydrochlorothiazide. Please review and e-scribe if applicable. Last Visit Date:  3-18-20  Next Visit Date:  Visit date not found    No results found for: LABA1C          ( goal A1C is < 7)   Microalb/Crt.  Ratio (mcg/mg creat)   Date Value   07/29/2015 4     LDL Cholesterol (mg/dL)   Date Value   07/29/2015 59       (goal LDL is <100)   AST (U/L)   Date Value   06/20/2014 27     ALT (U/L)   Date Value   06/20/2014 27     BUN (mg/dL)   Date Value   12/03/2018 8     BP Readings from Last 3 Encounters:   03/18/20 121/80   03/15/20 129/83   01/05/20 129/72          (goal 120/80)        Patient Active Problem List:     Asthma     COPD (chronic obstructive pulmonary disease) (HCC)     Hepatitis C     GERD (gastroesophageal reflux disease)     Hyperlipidemia     HTN (hypertension)     Allergic dermatitis     Rash     Vaginal discharge     Cervicitis     Viral gastroenteritis     Eosinophilic asthma ([facility] [address]     Gassiness      ----Sonia Deras

## 2020-05-18 ENCOUNTER — TELEPHONE (OUTPATIENT)
Dept: GASTROENTEROLOGY | Age: 50
End: 2020-05-18

## 2020-05-18 NOTE — TELEPHONE ENCOUNTER
Called patient and informed that due to not seeing patients in the office 5/27/20 has been cancelled. R/s for a doxy on 5/28/20 @ 3 pm.  Disclaimer read and accepted.

## 2020-05-28 ENCOUNTER — VIRTUAL VISIT (OUTPATIENT)
Dept: GASTROENTEROLOGY | Age: 50
End: 2020-05-28
Payer: COMMERCIAL

## 2020-05-28 PROCEDURE — 99213 OFFICE O/P EST LOW 20 MIN: CPT | Performed by: INTERNAL MEDICINE

## 2020-05-28 ASSESSMENT — ENCOUNTER SYMPTOMS
SINUS PRESSURE: 0
BLOOD IN STOOL: 0
GASTROINTESTINAL NEGATIVE: 1
RESPIRATORY NEGATIVE: 1
SINUS PAIN: 0
SORE THROAT: 0
SHORTNESS OF BREATH: 0
WHEEZING: 0
BACK PAIN: 0
TROUBLE SWALLOWING: 0
CONSTIPATION: 0
CHEST TIGHTNESS: 0
VOMITING: 0
RECTAL PAIN: 0
DIARRHEA: 0
NAUSEA: 0
ABDOMINAL PAIN: 0
ANAL BLEEDING: 0
ABDOMINAL DISTENTION: 0

## 2020-05-28 NOTE — PROGRESS NOTES
10 MG tablet take 1 tablet by mouth at bedtime Yes RONI Quiles CNP   omeprazole (PRILOSEC) 20 MG delayed release capsule take 1 capsule by mouth once daily Yes RONI Quiles CNP   budesonide-formoterol (SYMBICORT) 160-4.5 MCG/ACT AERO Inhale 2 puffs into the lungs 2 times daily Yes Mariea Sandhoff, MD   fluticasone (FLONASE) 50 MCG/ACT nasal spray 2 sprays by Each Nare route daily Yes Dustin Park MD   nystatin (MYCOSTATIN) 775508 UNIT/ML suspension swish and swallow 2.5 milliliters ON EACH SIDE OF MOUTH four times a day - CONTINUE TO USE FOR 2 DAYS AFTER THRUSH RESOLVES Yes Mariea Sandhoff, MD   benzonatate (TESSALON PERLES) 100 MG capsule Take 1 capsule by mouth 3 times daily as needed for Cough Yes Aftab Lepe PA-C   Handicap Placard MISC by Does not apply route For 3 years Yes Dustin Park MD   ibuprofen (ADVIL;MOTRIN) 800 MG tablet Take 1 tablet by mouth every 6 hours as needed for Pain Yes Jhonatan Gonzalez,    nicotine (NICODERM CQ) 14 MG/24HR Place 1 patch onto the skin daily for 14 days  RONI Quiles CNP   nicotine (NICODERM CQ) 7 MG/24HR Place 1 patch onto the skin daily for 14 days  RONI Quiles CNP   nicotine (NICODERM CQ) 21 MG/24HR Place 1 patch onto the skin every 24 hours  Dustin Park MD       Social History     Tobacco Use    Smoking status: Current Every Day Smoker     Packs/day: 0.50    Smokeless tobacco: Never Used    Tobacco comment: quit on 4/27/2011, resumed smoking 11/2018   Substance Use Topics    Alcohol use:  Yes     Alcohol/week: 0.0 standard drinks     Comment: ocasionally, beer    Drug use: No     Comment: Smoked 1ppd since 16 y/o        No Known Allergies,   Past Medical History:   Diagnosis Date    Allergic rhinitis     multiple environmental allergies    Asthma     near fatal asthma, severe persistent, follows  w Dr. Maninder Chester COPD (chronic obstructive pulmonary disease) ([facility] [address] 2011    mild stage, per Dr. Maninder Chester GERD (gastroesophageal reflux disease)     Hepatitis C     Hyperlipidemia    , History reviewed. No pertinent surgical history. ,   Social History     Tobacco Use    Smoking status: Current Every Day Smoker     Packs/day: 0.50    Smokeless tobacco: Never Used    Tobacco comment: quit on 4/27/2011, resumed smoking 11/2018   Substance Use Topics    Alcohol use:  Yes     Alcohol/week: 0.0 standard drinks     Comment: ocasionally, beer    Drug use: No     Comment: Smoked 1ppd since 14 y/o   ,   Family History   Problem Relation Age of Onset    Heart Disease Mother    ,   Immunization History   Administered Date(s) Administered    Influenza 01/11/2013    Pneumococcal Polysaccharide ([id number]) 03/17/2017    Tdap (Boostrix, Adacel) 01/11/2013   ,   Health Maintenance   Topic Date Due    Hepatitis A vaccine (1 of 2 - Risk 2-dose series) 07/29/1970    Hepatitis B vaccine (1 of 3 - Risk 3-dose series) 07/29/1988    Cervical cancer screen  06/20/2017    Breast cancer screen  07/29/2019    Shingles Vaccine (1 of 2) 07/29/2019    Colon cancer screen colonoscopy  07/29/2019    Potassium monitoring  12/03/2019    Creatinine monitoring  12/03/2019    Lipid screen  07/29/2020    Flu vaccine (Season Ended) 09/01/2020    DTaP/Tdap/Td vaccine (2 - Td) 01/11/2023    Pneumococcal 0-64 years Vaccine  Completed    HIV screen  Completed    Hib vaccine  Aged Out    Meningococcal (ACWY) vaccine  Aged Out       PHYSICAL EXAMINATION:  [ INSTRUCTIONS:  \"[x]\" Indicates a positive item  \"[]\" Indicates a negative item  -- DELETE ALL ITEMS NOT EXAMINED]  Vital Signs: (As obtained by patient/caregiver or practitioner observation)    Blood pressure-  Heart rate-    Respiratory rate-    Temperature-  Pulse oximetry-     Constitutional: [] Appears well-developed and well-nourished [] No apparent distress      [] Abnormal-   Mental status  [] Alert and awake  [] Oriented to person/place/time []Able to follow commands      Eyes:  EOM []  Normal  [] Abnormal-  Sclera  []  Normal  [] Abnormal -         Discharge []  None visible  [] Abnormal -    HENT:   [] Normocephalic, atraumatic. [] Abnormal   [] Mouth/Throat: Mucous membranes are moist.     External Ears [] Normal  [] Abnormal-     Neck: [] No visualized mass     Pulmonary/Chest: [] Respiratory effort normal.  [] No visualized signs of difficulty breathing or respiratory distress        [] Abnormal-      Musculoskeletal:   [] Normal gait with no signs of ataxia         [] Normal range of motion of neck        [] Abnormal-       Neurological:        [] No Facial Asymmetry (Cranial nerve 7 motor function) (limited exam to video visit)          [] No gaze palsy        [] Abnormal-         Skin:        [] No significant exanthematous lesions or discoloration noted on facial skin         [] Abnormal-            Psychiatric:       [] Normal Affect [] No Hallucinations        [] Abnormal-     Other pertinent observable physical exam findings-     ASSESSMENT/PLAN:  1. Acute hepatitis C virus infection without hepatic coma    - CBC Auto Differential; Future  - Comprehensive Metabolic Panel; Future  - Ethanol; Future  - Hepatitis A Antibody, Total; Future  - Hepatitis B Surface Antibody; Future  - HEPATITIS C GENOTYPING; Future  - Hepatitis C RNA, quantitative, PCR; Future  - Liver Fibrosis, Chronic Viral Hepatitis; Future  - Urine Drug Screen; Future  - HIV Screen; Future  - US Liver; Future    2. Chronic hepatitis C without hepatic coma (HCC)    - Hepatitis B Core Antibody, Total; Future  - Hepatitis B Surface Antigen; Future  - COLONOSCOPY (Screening)    3. Screening for colon cancer    - Hepatitis B Core Antibody, Total; Future  - Hepatitis B Surface Antigen; Future  - COLONOSCOPY (Screening)      No follow-ups on file. Seb Hansen is a 47 y.o. female being evaluated by a Virtual Visit (video visit) encounter to address concerns as mentioned above.   A caregiver was present when appropriate. Due to this being a TeleHealth encounter (During AOBPX-72 public health emergency), evaluation of the following organ systems was limited: Vitals/Constitutional/EENT/Resp/CV/GI//MS/Neuro/Skin/Heme-Lymph-Imm. Pursuant to the emergency declaration under the [address] [place] [address], [address] [place] [address] and the [employer] Act, this Virtual Visit was conducted with patient's (and/or legal guardian's) consent, to reduce the patient's risk of exposure to COVID-19 and provide necessary medical care. The patient (and/or legal guardian) has also been advised to contact this office for worsening conditions or problems, and seek emergency medical treatment and/or call 911 if deemed necessary. Patient identification was verified at the start of the visit: Yes    Total time spent on this encounter: Not billed by time    Services were provided through a video synchronous discussion virtually to substitute for in-person clinic visit. Patient and provider were located at their individual homes. --Jeanne Conner MD on 5/28/2020 at 3:11 PM    An electronic signature was used to authenticate this note.

## 2020-06-01 ENCOUNTER — TELEPHONE (OUTPATIENT)
Dept: GASTROENTEROLOGY | Age: 50
End: 2020-06-01

## 2020-06-18 RX ORDER — HYDROCHLOROTHIAZIDE 25 MG/1
TABLET ORAL
Qty: 30 TABLET | Refills: 0 | Status: SHIPPED | OUTPATIENT
Start: 2020-06-18 | End: 2020-07-29 | Stop reason: SDUPTHER

## 2020-06-18 NOTE — TELEPHONE ENCOUNTER
Last visit:   Last Med refill:   Does patient have enough medication for 72 hours: no    Next Visit Date:  Future Appointments   Date Time Provider Hernandez Dunnei   6/22/2020  9:30 AM STV ULTRASOUND RM 1 STV[facility] US [facility] Radiolog       Health Maintenance   Topic Date Due    Hepatitis A vaccine (1 of 2 - Risk 2-dose series) 07/29/1970    Hepatitis B vaccine (1 of 3 - Risk 3-dose series) 07/29/1988    Cervical cancer screen  06/20/2017    Breast cancer screen  07/29/2019    Shingles Vaccine (1 of 2) 07/29/2019    Colon cancer screen colonoscopy  07/29/2019    Lipid screen  07/29/2020    Flu vaccine (Season Ended) 09/01/2020    DTaP/Tdap/Td vaccine (2 - Td) 01/11/2023    Pneumococcal 0-64 years Vaccine  Completed    HIV screen  Completed    Hib vaccine  Aged Out    Meningococcal (ACWY) vaccine  Aged Out       No results found for: LABA1C          ( goal A1C is < 7)   Microalb/Crt.  Ratio (mcg/mg creat)   Date Value   07/29/2015 4     LDL Cholesterol (mg/dL)   Date Value   07/29/2015 59   06/20/2014 24       (goal LDL is <100)   AST (U/L)   Date Value   06/20/2014 27     ALT (U/L)   Date Value   06/20/2014 27     BUN (mg/dL)   Date Value   12/03/2018 8     BP Readings from Last 3 Encounters:   03/18/20 121/80   03/15/20 129/83   01/05/20 129/72          (goal 120/80)    All Future Testing planned in [facility]  Lab Frequency Next Occurrence   CBC Auto Differential Once 05/28/2020   Comprehensive Metabolic Panel Once 01/34/7162   Ethanol Once 05/28/2020   Hepatitis A Antibody, Total Once 05/28/2020   Hepatitis B Surface Antibody Once 05/28/2020   HEPATITIS C GENOTYPING Once 05/28/2020   Hepatitis C RNA, quantitative, PCR Once 05/28/2020   Liver Fibrosis, Chronic Viral Hepatitis Once 05/28/2020   Urine Drug Screen Once 05/28/2020   HIV Screen Once 05/28/2020   US Liver Once 05/28/2020   Hepatitis B Core Antibody, Total Once 08/28/2020   Hepatitis B Surface Antigen Once 08/28/2020               Patient Active Problem List:     Asthma     COPD (chronic obstructive pulmonary disease) (HCC)     Hepatitis C     GERD (gastroesophageal reflux disease)     Hyperlipidemia     HTN (hypertension)     Allergic dermatitis     Rash     Vaginal discharge     Cervicitis     Viral gastroenteritis     Eosinophilic asthma ([facility] [address]     Gassiness           Please address the medication refill and close the encounter. If I can be of assistance, please route to the applicable pool. Thank you.

## 2020-07-23 ENCOUNTER — OFFICE VISIT (OUTPATIENT)
Dept: FAMILY MEDICINE CLINIC | Age: 50
End: 2020-07-23
Payer: COMMERCIAL

## 2020-07-23 VITALS
OXYGEN SATURATION: 98 % | SYSTOLIC BLOOD PRESSURE: 133 MMHG | TEMPERATURE: 96.9 F | WEIGHT: 129 LBS | RESPIRATION RATE: 18 BRPM | DIASTOLIC BLOOD PRESSURE: 84 MMHG | BODY MASS INDEX: 19.33 KG/M2 | HEART RATE: 87 BPM

## 2020-07-23 PROCEDURE — 99213 OFFICE O/P EST LOW 20 MIN: CPT | Performed by: STUDENT IN AN ORGANIZED HEALTH CARE EDUCATION/TRAINING PROGRAM

## 2020-07-23 PROCEDURE — 99211 OFF/OP EST MAY X REQ PHY/QHP: CPT | Performed by: FAMILY MEDICINE

## 2020-07-23 RX ORDER — IBUPROFEN 800 MG/1
800 TABLET ORAL EVERY 6 HOURS PRN
Qty: 30 TABLET | Refills: 0 | Status: SHIPPED | OUTPATIENT
Start: 2020-07-23 | End: 2022-04-28

## 2020-07-23 ASSESSMENT — PATIENT HEALTH QUESTIONNAIRE - PHQ9
2. FEELING DOWN, DEPRESSED OR HOPELESS: 0
1. LITTLE INTEREST OR PLEASURE IN DOING THINGS: 0
SUM OF ALL RESPONSES TO PHQ QUESTIONS 1-9: 0
SUM OF ALL RESPONSES TO PHQ9 QUESTIONS 1 & 2: 0
SUM OF ALL RESPONSES TO PHQ QUESTIONS 1-9: 0

## 2020-07-23 ASSESSMENT — ENCOUNTER SYMPTOMS
SHORTNESS OF BREATH: 0
DIARRHEA: 0
ABDOMINAL PAIN: 0
VOMITING: 0
NAUSEA: 0

## 2020-07-23 NOTE — PROGRESS NOTES
addison Subjective:    Luiz Howard is a 47 y.o. female with  has a past medical history of Allergic rhinitis, Asthma, COPD (chronic obstructive pulmonary disease) ([address] GERD (gastroesophageal reflux disease), Hepatitis C, and Hyperlipidemia. Presented to the office today for:  Chief Complaint   Patient presents with    Hand Pain     Patient states she woke up 3 days ago with right hand and wrist pain. Pain is 8/10 using ibuprofen, advil and tylenol for treatment.  Health Maintenance     refused       HPI    47year old female presented to the office today with complaint of right arm pain. Patient stated that she started having this pain 3 days ago. Patient noticed the pain when she woke up. Patient described pain as \"aching\", from elbow to the wrist. Patient stated that the pain is constantly there, 8/10 intensity. Pain is better when arm is elevated and worsens on movement and activity. Patient noticed some UE swelling in the hands today AM. Patient denied any weakness, numbness or tingling. Patient denied any trauma or injury to arm. No other acute concerns at this time. Review of Systems   Constitutional: Negative for chills and fever. Respiratory: Negative for shortness of breath. Cardiovascular: Negative for chest pain. Gastrointestinal: Negative for abdominal pain, diarrhea, nausea and vomiting. Musculoskeletal:        Right arm pain from elbow to hand       The patient has a   Family History   Problem Relation Age of Onset    Heart Disease Mother        Objective:    /84 (Site: Left Upper Arm, Position: Sitting, Cuff Size: Medium Adult)   Pulse 87   Temp 96.9 °F (36.1 °C)   Resp 18   Wt 129 lb (58.5 kg)   LMP 07/19/2020 (Exact Date)   SpO2 98%   BMI 19.33 kg/m²    BP Readings from Last 3 Encounters:   07/23/20 133/84   03/18/20 121/80   03/15/20 129/83       Physical Exam  HENT:      Head: Normocephalic and atraumatic.    Cardiovascular:      Rate and Rhythm: Normal Bandages & Supports (WRAP/PAIN FREE) MISC 1 each 0     Sig: Wrap for right arm. Apply as needed. Medications Discontinued During This Encounter   Medication Reason    ibuprofen (ADVIL;MOTRIN) 789 MG tablet DUPLICATE    ibuprofen (ADVIL;MOTRIN) 800 MG tablet REORDER       Sonia received counseling on the following healthy behaviors: nutrition, exercise and medication adherence    Discussed use,benefit, and side effects of prescribed medications. Barriers to medication compliance addressed. All patient questions answered. Pt voiced understanding. Return in about 1 week (around 7/30/2020). Disclaimer: Some orall of this note was transcribed using voice-recognition software. This may cause typographical errors occasionally. Although all effort is made to fix these errors, please do not hesitate to contact our office if there Tacey Lash concern with the understanding of this note.

## 2020-07-23 NOTE — PROGRESS NOTES
Attending Physician Statement  I have discussed the care of StephanieWeatherlyincluding pertinent history and exam findings,  with the resident. I have reviewed the key elements of all parts of the encounter with the resident. I agree with the assessment, plan and orders as documented by the resident.   (GE Modifier)    R arm pain acute-

## 2020-07-23 NOTE — PROGRESS NOTES
Visit Information    Have you changed or started any medications since your last visit including any over-the-counter medicines, vitamins, or herbal medicines? no   Have you stopped taking any of your medications? Is so, why? -  no  Are you having any side effects from any of your medications? - no    Have you seen any other physician or provider since your last visit? yes - gastro   Have you had any other diagnostic tests since your last visit?  no   Have you been seen in the emergency room and/or had an admission in a hospital since we last saw you?  no   Have you had your routine dental cleaning in the past 6 months?  no     Do you have an active MyChart account? If no, what is the barrier?   Yes    Patient Care Team:  Theodore Islas MD as PCP - General (Family Medicine)  Noy Garay DO as Consulting Physician (Pulmonology)    Medical History Review  Past Medical, Family, and Social History reviewed and does contribute to the patient presenting condition    Health Maintenance   Topic Date Due    Hepatitis A vaccine (1 of 2 - Risk 2-dose series) 07/29/1970    Hepatitis B vaccine (1 of 3 - Risk 3-dose series) 07/29/1988    Cervical cancer screen  06/20/2017    Breast cancer screen  07/29/2019    Shingles Vaccine (1 of 2) 07/29/2019    Colon cancer screen colonoscopy  07/29/2019    Lipid screen  07/29/2020    Flu vaccine (1) 09/01/2020    DTaP/Tdap/Td vaccine (2 - Td) 01/11/2023    Pneumococcal 0-64 years Vaccine  Completed    HIV screen  Completed    Hib vaccine  Aged Out    Meningococcal (ACWY) vaccine  Aged Out

## 2020-07-24 ENCOUNTER — HOSPITAL ENCOUNTER (OUTPATIENT)
Dept: VASCULAR LAB | Age: 50
Discharge: HOME | End: 2020-07-24
Payer: COMMERCIAL

## 2020-07-24 PROCEDURE — 93971 EXTREMITY STUDY: CPT

## 2020-07-29 RX ORDER — HYDROCHLOROTHIAZIDE 25 MG/1
TABLET ORAL
Qty: 30 TABLET | Refills: 0 | Status: SHIPPED | OUTPATIENT
Start: 2020-07-29 | End: 2020-08-13

## 2020-07-29 NOTE — TELEPHONE ENCOUNTER
E-scribe request for Hydrochlorothiazide 25 MG. Please review and e-scribe if applicable. Next Visit Date:  7/30/2020    Health Maintenance   Topic Date Due    Hepatitis A vaccine (1 of 2 - Risk 2-dose series) 07/29/1970    Hepatitis B vaccine (1 of 3 - Risk 3-dose series) 07/29/1988    Cervical cancer screen  06/20/2017    Breast cancer screen  07/29/2019    Shingles Vaccine (1 of 2) 07/29/2019    Colon cancer screen colonoscopy  07/29/2019    Lipid screen  07/29/2020    Flu vaccine (1) 09/01/2020    DTaP/Tdap/Td vaccine (2 - Td) 01/11/2023    Pneumococcal 0-64 years Vaccine  Completed    HIV screen  Completed    Hib vaccine  Aged Out    Meningococcal (ACWY) vaccine  Aged Out             (applicable per patient's age: Cancer Screenings, Depression Screening, Fall Risk Screening, Immunizations)    Microalb/Crt.  Ratio (mcg/mg creat)   Date Value   07/29/2015 4     LDL Cholesterol (mg/dL)   Date Value   07/29/2015 59     AST (U/L)   Date Value   06/20/2014 27     ALT (U/L)   Date Value   06/20/2014 27     BUN (mg/dL)   Date Value   12/03/2018 8      (goal A1C is < 7)   (goal LDL is <100) need 30-50% reduction from baseline     BP Readings from Last 3 Encounters:   07/23/20 133/84   03/18/20 121/80   03/15/20 129/83    (goal /80)      All Future Testing planned in [facility]:  Lab Frequency Next Occurrence   CBC Auto Differential Once 05/28/2020   Comprehensive Metabolic Panel Once 79/86/3163   Ethanol Once 05/28/2020   Hepatitis A Antibody, Total Once 05/28/2020   Hepatitis B Surface Antibody Once 05/28/2020   HEPATITIS C GENOTYPING Once 05/28/2020   Hepatitis C RNA, quantitative, PCR Once 05/28/2020   Liver Fibrosis, Chronic Viral Hepatitis Once 05/28/2020   Urine Drug Screen Once 05/28/2020   HIV Screen Once 05/28/2020   US Liver Once 05/28/2020   Hepatitis B Core Antibody, Total Once 08/28/2020   Hepatitis B Surface Antigen Once 08/28/2020   Lipid Panel Once 07/23/2020       Next Visit Date:  Future Appointments   Date Time Provider Hernandez Amezcua   7/30/2020  9:30 AM French Pickard MD [address] [facility]            Patient Active Problem List:     Asthma     COPD (chronic obstructive pulmonary disease) ([facility] [address]     Hepatitis C     GERD (gastroesophageal reflux disease)     Hyperlipidemia     HTN (hypertension)     Allergic dermatitis     Rash     Vaginal discharge     Cervicitis     Viral gastroenteritis     Eosinophilic asthma ([facility] [address]     Gassiness

## 2020-07-30 RX ORDER — CETIRIZINE HYDROCHLORIDE 10 MG/1
TABLET ORAL
Qty: 30 TABLET | Refills: 3 | Status: SHIPPED | OUTPATIENT
Start: 2020-07-30 | End: 2020-11-11

## 2020-07-30 NOTE — TELEPHONE ENCOUNTER
Please address the medication refill and close the encounter. If I can be of assistance, please route to the applicable pool. Thank you. Last visit: 07/23/20  Last Med refill: 03/10/20  Does patient have enough medication for 72 hours: No:     Next Visit Date:  Future Appointments   Date Time Provider Hernandez Amezcua   7/30/2020  9:30 AM Kurtis Dawson MD [address] [place] [address] Maintenance   Topic Date Due    Hepatitis A vaccine (1 of 2 - Risk 2-dose series) 07/29/1970    Hepatitis B vaccine (1 of 3 - Risk 3-dose series) 07/29/1988    Cervical cancer screen  06/20/2017    Breast cancer screen  07/29/2019    Shingles Vaccine (1 of 2) 07/29/2019    Colon cancer screen colonoscopy  07/29/2019    Potassium monitoring  12/03/2019    Creatinine monitoring  12/03/2019    Lipid screen  07/29/2020    Flu vaccine (1) 09/01/2020    DTaP/Tdap/Td vaccine (2 - Td) 01/11/2023    Pneumococcal 0-64 years Vaccine  Completed    HIV screen  Completed    Hib vaccine  Aged Out    Meningococcal (ACWY) vaccine  Aged Out       No results found for: LABA1C          ( goal A1C is < 7)   Microalb/Crt.  Ratio (mcg/mg creat)   Date Value   07/29/2015 4     LDL Cholesterol (mg/dL)   Date Value   07/29/2015 59   06/20/2014 24       (goal LDL is <100)   AST (U/L)   Date Value   06/20/2014 27     ALT (U/L)   Date Value   06/20/2014 27     BUN (mg/dL)   Date Value   12/03/2018 8     BP Readings from Last 3 Encounters:   07/23/20 133/84   03/18/20 121/80   03/15/20 129/83          (goal 120/80)    All Future Testing planned in [facility]  Lab Frequency Next Occurrence   CBC Auto Differential Once 05/28/2020   Comprehensive Metabolic Panel Once 46/12/0705   Ethanol Once 05/28/2020   Hepatitis A Antibody, Total Once 05/28/2020   Hepatitis B Surface Antibody Once 05/28/2020   HEPATITIS C GENOTYPING Once 05/28/2020   Hepatitis C RNA, quantitative, PCR Once 05/28/2020   Liver Fibrosis, Chronic Viral Hepatitis Once 05/28/2020 Urine Drug Screen Once 05/28/2020   HIV Screen Once 05/28/2020   US Liver Once 05/28/2020   Hepatitis B Core Antibody, Total Once 08/28/2020   Hepatitis B Surface Antigen Once 08/28/2020   Lipid Panel Once 07/23/2020               Patient Active Problem List:     Asthma     COPD (chronic obstructive pulmonary disease) ([facility] [address]     Hepatitis C     GERD (gastroesophageal reflux disease)     Hyperlipidemia     HTN (hypertension)     Allergic dermatitis     Rash     Vaginal discharge     Cervicitis     Viral gastroenteritis     Eosinophilic asthma ([facility] [address]     Gassiness

## 2020-08-13 ENCOUNTER — TELEPHONE (OUTPATIENT)
Dept: ADMINISTRATIVE | Age: 50
End: 2020-08-13

## 2020-08-13 RX ORDER — HYDROCHLOROTHIAZIDE 25 MG/1
TABLET ORAL
Qty: 30 TABLET | Refills: 0 | Status: SHIPPED | OUTPATIENT
Start: 2020-08-13 | End: 2020-09-16 | Stop reason: SDUPTHER

## 2020-08-13 NOTE — TELEPHONE ENCOUNTER
Please address the medication refill and close the encounter. If I can be of assistance, please route to the applicable pool. Thank you. Last visit: 07/23/20  Last Med refill: 07/29/20  Does patient have enough medication for 72 hours: No:     Next Visit Date:  No future appointments. Health Maintenance   Topic Date Due    Hepatitis A vaccine (1 of 2 - Risk 2-dose series) 07/29/1970    Hepatitis B vaccine (1 of 3 - Risk 3-dose series) 07/29/1988    Cervical cancer screen  06/20/2017    Breast cancer screen  07/29/2019    Shingles Vaccine (1 of 2) 07/29/2019    Colon cancer screen colonoscopy  07/29/2019    Potassium monitoring  12/03/2019    Creatinine monitoring  12/03/2019    Lipid screen  07/29/2020    Flu vaccine (1) 09/01/2020    DTaP/Tdap/Td vaccine (2 - Td) 01/11/2023    Pneumococcal 0-64 years Vaccine  Completed    HIV screen  Completed    Hib vaccine  Aged Out    Meningococcal (ACWY) vaccine  Aged Out       No results found for: LABA1C          ( goal A1C is < 7)   Microalb/Crt.  Ratio (mcg/mg creat)   Date Value   07/29/2015 4     LDL Cholesterol (mg/dL)   Date Value   07/29/2015 59   06/20/2014 24       (goal LDL is <100)   AST (U/L)   Date Value   06/20/2014 27     ALT (U/L)   Date Value   06/20/2014 27     BUN (mg/dL)   Date Value   12/03/2018 8     BP Readings from Last 3 Encounters:   07/23/20 133/84   03/18/20 121/80   03/15/20 129/83          (goal 120/80)    All Future Testing planned in [facility]  Lab Frequency Next Occurrence   CBC Auto Differential Once 05/28/2020   Comprehensive Metabolic Panel Once 52/60/2205   Ethanol Once 05/28/2020   Hepatitis A Antibody, Total Once 05/28/2020   Hepatitis B Surface Antibody Once 05/28/2020   HEPATITIS C GENOTYPING Once 05/28/2020   Hepatitis C RNA, quantitative, PCR Once 05/28/2020   Liver Fibrosis, Chronic Viral Hepatitis Once 05/28/2020   Urine Drug Screen Once 05/28/2020   HIV Screen Once 05/28/2020   US Liver Once 05/28/2020 Hepatitis B Core Antibody, Total Once 08/28/2020   Hepatitis B Surface Antigen Once 08/28/2020   Lipid Panel Once 07/23/2021               Patient Active Problem List:     Asthma     COPD (chronic obstructive pulmonary disease) ([facility] [address]     Hepatitis C     GERD (gastroesophageal reflux disease)     Hyperlipidemia     HTN (hypertension)     Allergic dermatitis     Rash     Vaginal discharge     Cervicitis     Viral gastroenteritis     Eosinophilic asthma ([facility] [address]     Gassiness

## 2020-08-18 ENCOUNTER — TELEPHONE (OUTPATIENT)
Dept: FAMILY MEDICINE CLINIC | Age: 50
End: 2020-08-18

## 2020-08-24 ENCOUNTER — HOSPITAL ENCOUNTER (OUTPATIENT)
Age: 50
Setting detail: SPECIMEN
Discharge: HOME | End: 2020-08-24
Payer: COMMERCIAL

## 2020-08-24 LAB
CHOLESTEROL/HDL RATIO: 1.5
CHOLESTEROL: 235 MG/DL
HDLC SERPL-MCNC: 159 MG/DL
LDL CHOLESTEROL: 58 MG/DL (ref 0–130)
TRIGL SERPL-MCNC: 91 MG/DL
VLDLC SERPL CALC-MCNC: ABNORMAL MG/DL (ref 1–30)

## 2020-08-25 ENCOUNTER — TELEPHONE (OUTPATIENT)
Dept: GASTROENTEROLOGY | Age: 50
End: 2020-08-25

## 2020-08-25 ENCOUNTER — OFFICE VISIT (OUTPATIENT)
Dept: FAMILY MEDICINE CLINIC | Age: 50
End: 2020-08-25
Payer: COMMERCIAL

## 2020-08-25 VITALS
TEMPERATURE: 97.6 F | HEART RATE: 78 BPM | BODY MASS INDEX: 19.03 KG/M2 | SYSTOLIC BLOOD PRESSURE: 116 MMHG | DIASTOLIC BLOOD PRESSURE: 83 MMHG | WEIGHT: 127 LBS | OXYGEN SATURATION: 97 %

## 2020-08-25 PROCEDURE — 99213 OFFICE O/P EST LOW 20 MIN: CPT | Performed by: STUDENT IN AN ORGANIZED HEALTH CARE EDUCATION/TRAINING PROGRAM

## 2020-08-25 PROCEDURE — 99211 OFF/OP EST MAY X REQ PHY/QHP: CPT | Performed by: STUDENT IN AN ORGANIZED HEALTH CARE EDUCATION/TRAINING PROGRAM

## 2020-08-25 RX ORDER — ALBUTEROL SULFATE 90 UG/1
AEROSOL, METERED RESPIRATORY (INHALATION)
Qty: 1 INHALER | Refills: 6 | Status: SHIPPED | OUTPATIENT
Start: 2020-08-25 | End: 2020-10-05 | Stop reason: SDUPTHER

## 2020-08-25 ASSESSMENT — ENCOUNTER SYMPTOMS
ABDOMINAL PAIN: 0
VOMITING: 0
SHORTNESS OF BREATH: 0
NAUSEA: 0
DIARRHEA: 0

## 2020-08-25 NOTE — PROGRESS NOTES
inhaler REORDER       Tashi Riggins received counseling on the following healthy behaviors: nutrition, exercise and medication adherence    Discussed use,benefit, and side effects of prescribed medications. Barriers to medication compliance addressed. All patient questions answered. Pt voiced understanding. Return in about 3 months (around 11/25/2020) for COPD, asthma, HTN. Disclaimer: Some orall of this note was transcribed using voice-recognition software. This may cause typographical errors occasionally. Although all effort is made to fix these errors, please do not hesitate to contact our office if there Yvan Jetty concern with the understanding of this note.

## 2020-08-25 NOTE — PROGRESS NOTES
Visit Information    Have you changed or started any medications since your last visit including any over-the-counter medicines, vitamins, or herbal medicines? no   Have you stopped taking any of your medications? Is so, why? -  no  Are you having any side effects from any of your medications? - no    Have you seen any other physician or provider since your last visit?  no   Have you had any other diagnostic tests since your last visit?  no   Have you been seen in the emergency room and/or had an admission in a hospital since we last saw you?  no   Have you had your routine dental cleaning in the past 6 months?  no     Do you have an active MyChart account? If no, what is the barrier?   Yes    Patient Care Team:  Nathaniel Hutchins MD as PCP - General (Family Medicine)  Stacie Lara DO as Consulting Physician (Pulmonology)    Medical History Review  Past Medical, Family, and Social History reviewed and does not contribute to the patient presenting condition    Health Maintenance   Topic Date Due    Hepatitis A vaccine (1 of 2 - Risk 2-dose series) 07/29/1970    Hepatitis B vaccine (1 of 3 - Risk 3-dose series) 07/29/1988    Cervical cancer screen  06/20/2017    Breast cancer screen  07/29/2019    Shingles Vaccine (1 of 2) 07/29/2019    Colon cancer screen colonoscopy  07/29/2019    Potassium monitoring  12/03/2019    Creatinine monitoring  12/03/2019    Flu vaccine (1) 09/01/2020    DTaP/Tdap/Td vaccine (2 - Td) 01/11/2023    Lipid screen  08/24/2025    Pneumococcal 0-64 years Vaccine  Completed    HIV screen  Completed    Hib vaccine  Aged Out    Meningococcal (ACWY) vaccine  Aged Out

## 2020-08-25 NOTE — PROGRESS NOTES
Attending Physician Statement  I have discussed the care of [facility] 45 y.o. female, including pertinent history and exam findings, with the resident Dr. Rosalia Helm MD.    History and Exam:   Chief Complaint   Patient presents with    1 Month Follow-Up     1 month follow up for handpain, states her hand is better now. No issues    Medication Refill     needs refill on inhaler    Health Maintenance     due for pap and breast cancer screen. refused vaccines    Forms     needs fmla forms completed     Past Medical History:   Diagnosis Date    Allergic rhinitis     multiple environmental allergies    Asthma     near fatal asthma, severe persistent, follows  w Dr. Adela Negron COPD (chronic obstructive pulmonary disease) ([facility] [address] 2011    mild stage, per Dr. Adela Negron GERD (gastroesophageal reflux disease)     Hepatitis C     Hyperlipidemia      No Known Allergies   I have seen and examined the patient and the key elements of the encounter have been performed by me.   BP Readings from Last 3 Encounters:   08/25/20 116/83   07/23/20 133/84   03/18/20 121/80     /83 (Site: Right Upper Arm, Position: Sitting, Cuff Size: Medium Adult)   Pulse 78   Temp 97.6 °F (36.4 °C)   Wt 127 lb (57.6 kg)   SpO2 97%   BMI 19.03 kg/m²   Lab Results   Component Value Date    WBC 6.7 06/20/2014    HGB 13.2 06/20/2014    HCT 39.2 06/20/2014     06/20/2014    CHOL 235 (H) 08/24/2020    TRIG 91 08/24/2020     08/24/2020    ALT 27 06/20/2014    AST 27 06/20/2014     12/03/2018    K 4.0 12/03/2018    CL 99 12/03/2018    CREATININE 0.88 12/03/2018    BUN 8 12/03/2018    CO2 24 12/03/2018    TSH 1.06 08/29/2011    INR 0.9 08/29/2011    LABMICR 4 07/29/2015     Lab Results   Component Value Date    LABALBU 3.6 06/20/2014     No results found for: IRON, TIBC, FERRITIN  Lab Results   Component Value Date    LDLCHOLESTEROL 58 08/24/2020     I agree with the assessment, plan and the diagnosis of    Diagnosis Orders   1. Panlobular emphysema ([facility] [address]     2. Poorly controlled severe persistent asthma without complication  albuterol sulfate  (90 Base) MCG/ACT inhaler   3. Swelling of right upper extremity      . I agree with orders as documented by the resident. Recommendations:    Emphysema, needs FMLA paperwork and medication refill  RUE swelling is resolved, continue to monitor    More than 25 minutes spent  in face to face encounter with the patient and more than half in counseling. Patient's questions were answered. Patient Voiced understanding to the counseling.   Return in about 3 months (around 11/25/2020) for COPD, asthma, HTN.   (GC Modifier)-Dr. Gabriel Blas MD

## 2020-09-09 NOTE — TELEPHONE ENCOUNTER
Please address the medication refill and close the encounter. If I can be of assistance, please route to the applicable pool. Thank you. Last visit: 08/25/20  Last Med refill: 08/13/20  Does patient have enough medication for 72 hours: No:     Next Visit Date:  No future appointments. Health Maintenance   Topic Date Due    Hepatitis A vaccine (1 of 2 - Risk 2-dose series) 07/29/1970    Hepatitis B vaccine (1 of 3 - Risk 3-dose series) 07/29/1988    Cervical cancer screen  06/20/2017    Breast cancer screen  07/29/2019    Shingles Vaccine (1 of 2) 07/29/2019    Colon cancer screen colonoscopy  07/29/2019    Potassium monitoring  12/03/2019    Creatinine monitoring  12/03/2019    Flu vaccine (1) 09/01/2020    DTaP/Tdap/Td vaccine (2 - Td) 01/11/2023    Lipid screen  08/24/2025    Pneumococcal 0-64 years Vaccine  Completed    HIV screen  Completed    Hib vaccine  Aged Out    Meningococcal (ACWY) vaccine  Aged Out       No results found for: LABA1C          ( goal A1C is < 7)   Microalb/Crt.  Ratio (mcg/mg creat)   Date Value   07/29/2015 4     LDL Cholesterol (mg/dL)   Date Value   08/24/2020 58   07/29/2015 59       (goal LDL is <100)   AST (U/L)   Date Value   06/20/2014 27     ALT (U/L)   Date Value   06/20/2014 27     BUN (mg/dL)   Date Value   12/03/2018 8     BP Readings from Last 3 Encounters:   08/25/20 116/83   07/23/20 133/84   03/18/20 121/80          (goal 120/80)    All Future Testing planned in [facility]  Lab Frequency Next Occurrence   CBC Auto Differential Once 08/25/2020   Comprehensive Metabolic Panel Once 87/24/9506   Ethanol Once 08/25/2020   Hepatitis A Antibody, Total Once 08/25/2020   Hepatitis B Surface Antibody Once 08/25/2020   Hepatitis C Antibody Once 08/25/2020   HEPATITIS C GENOTYPING Once 08/25/2020   Hepatitis C RNA, quantitative, PCR Once 08/25/2020   Liver Fibrosis, Chronic Viral Hepatitis Once 08/25/2020   Urine Drug Screen Once 08/25/2020   HIV Screen Once

## 2020-09-15 NOTE — TELEPHONE ENCOUNTER
.     Last Visit Date:  8-25-20  Next Visit Date:  Visit date not found    No results found for: LABA1C          ( goal A1C is < 7)   Microalb/Crt.  Ratio (mcg/mg creat)   Date Value   07/29/2015 4     LDL Cholesterol (mg/dL)   Date Value   08/24/2020 58       (goal LDL is <100)   AST (U/L)   Date Value   06/20/2014 27     ALT (U/L)   Date Value   06/20/2014 27     BUN (mg/dL)   Date Value   12/03/2018 8     BP Readings from Last 3 Encounters:   08/25/20 116/83   07/23/20 133/84   03/18/20 121/80          (goal 120/80)        Patient Active Problem List:     Asthma     COPD (chronic obstructive pulmonary disease) (HCC)     Hepatitis C     GERD (gastroesophageal reflux disease)     Hyperlipidemia     HTN (hypertension)     Allergic dermatitis     Rash     Vaginal discharge     Cervicitis     Viral gastroenteritis     E[facility]     Gassiness      ----Kim Paez

## 2020-09-16 RX ORDER — HYDROCHLOROTHIAZIDE 25 MG/1
TABLET ORAL
Qty: 30 TABLET | Refills: 0 | OUTPATIENT
Start: 2020-09-16 | End: 2020-10-16

## 2020-09-16 RX ORDER — HYDROCHLOROTHIAZIDE 25 MG/1
TABLET ORAL
Qty: 30 TABLET | Refills: 0 | Status: SHIPPED | OUTPATIENT
Start: 2020-09-16 | End: 2020-10-13

## 2020-09-18 ENCOUNTER — TELEPHONE (OUTPATIENT)
Dept: FAMILY MEDICINE CLINIC | Age: 50
End: 2020-09-18

## 2020-09-18 RX ORDER — BUDESONIDE AND FORMOTEROL FUMARATE DIHYDRATE 160; 4.5 UG/1; UG/1
2 AEROSOL RESPIRATORY (INHALATION) 2 TIMES DAILY
Qty: 1 INHALER | Refills: 5 | Status: SHIPPED | OUTPATIENT
Start: 2020-09-18 | End: 2020-10-05 | Stop reason: SDUPTHER

## 2020-09-30 NOTE — TELEPHONE ENCOUNTER
Last Visit Date:  8-25-20  Next Visit Date:  Visit date not found    No results found for: LABA1C          ( goal A1C is < 7)   Microalb/Crt.  Ratio (mcg/mg creat)   Date Value   07/29/2015 4     LDL Cholesterol (mg/dL)   Date Value   08/24/2020 58       (goal LDL is <100)   AST (U/L)   Date Value   06/20/2014 27     ALT (U/L)   Date Value   06/20/2014 27     BUN (mg/dL)   Date Value   12/03/2018 8     BP Readings from Last 3 Encounters:   08/25/20 116/83   07/23/20 133/84   03/18/20 121/80          (goal 120/80)        Patient Active Problem List:     Asthma     COPD (chronic obstructive pulmonary disease) (HCC)     Hepatitis C     GERD (gastroesophageal reflux disease)     Hyperlipidemia     HTN (hypertension)     Allergic dermatitis     Rash     Vaginal discharge     Cervicitis     Viral gastroenteritis     Eosinophilic asthma ([facility] [address]     Gassiness      ----Kelley Hameed

## 2020-10-01 RX ORDER — MONTELUKAST SODIUM 10 MG/1
TABLET ORAL
Qty: 30 TABLET | Refills: 11 | Status: SHIPPED | OUTPATIENT
Start: 2020-10-01 | End: 2020-10-05 | Stop reason: SDUPTHER

## 2020-10-05 ENCOUNTER — VIRTUAL VISIT (OUTPATIENT)
Dept: PULMONOLOGY | Age: 50
End: 2020-10-05
Payer: COMMERCIAL

## 2020-10-05 PROCEDURE — 99442 PR PHYS/QHP TELEPHONE EVALUATION 11-20 MIN: CPT | Performed by: INTERNAL MEDICINE

## 2020-10-05 PROCEDURE — 3017F COLORECTAL CA SCREEN DOC REV: CPT | Performed by: INTERNAL MEDICINE

## 2020-10-05 PROCEDURE — G8484 FLU IMMUNIZE NO ADMIN: HCPCS | Performed by: INTERNAL MEDICINE

## 2020-10-05 PROCEDURE — G8420 CALC BMI NORM PARAMETERS: HCPCS | Performed by: INTERNAL MEDICINE

## 2020-10-05 PROCEDURE — 4004F PT TOBACCO SCREEN RCVD TLK: CPT | Performed by: INTERNAL MEDICINE

## 2020-10-05 PROCEDURE — G8427 DOCREV CUR MEDS BY ELIG CLIN: HCPCS | Performed by: INTERNAL MEDICINE

## 2020-10-05 RX ORDER — BUDESONIDE AND FORMOTEROL FUMARATE DIHYDRATE 160; 4.5 UG/1; UG/1
2 AEROSOL RESPIRATORY (INHALATION) 2 TIMES DAILY
Qty: 3 INHALER | Refills: 3 | Status: SHIPPED | OUTPATIENT
Start: 2020-10-05 | End: 2021-08-31 | Stop reason: SDUPTHER

## 2020-10-05 RX ORDER — MONTELUKAST SODIUM 10 MG/1
10 TABLET ORAL NIGHTLY
Qty: 90 TABLET | Refills: 3 | Status: SHIPPED | OUTPATIENT
Start: 2020-10-05 | End: 2021-10-11

## 2020-10-05 RX ORDER — AZITHROMYCIN 250 MG/1
250 TABLET, FILM COATED ORAL SEE ADMIN INSTRUCTIONS
Qty: 6 TABLET | Refills: 0 | Status: SHIPPED | OUTPATIENT
Start: 2020-10-05 | End: 2020-10-10

## 2020-10-05 RX ORDER — VARENICLINE TARTRATE 0.5 MG/1
.5-1 TABLET, FILM COATED ORAL SEE ADMIN INSTRUCTIONS
Qty: 57 TABLET | Refills: 0 | Status: SHIPPED | OUTPATIENT
Start: 2020-10-05

## 2020-10-05 RX ORDER — ALBUTEROL SULFATE 90 UG/1
AEROSOL, METERED RESPIRATORY (INHALATION)
Qty: 3 INHALER | Refills: 3 | Status: SHIPPED | OUTPATIENT
Start: 2020-10-05 | End: 2021-07-23

## 2020-10-05 RX ORDER — VARENICLINE TARTRATE 1 MG/1
1 TABLET, FILM COATED ORAL DAILY
Qty: 30 TABLET | Refills: 5 | Status: SHIPPED | OUTPATIENT
Start: 2020-10-05

## 2020-10-05 ASSESSMENT — ENCOUNTER SYMPTOMS
COUGH: 1
EYES NEGATIVE: 1
GASTROINTESTINAL NEGATIVE: 1
SHORTNESS OF BREATH: 1
CHEST TIGHTNESS: 1
WHEEZING: 1

## 2020-10-05 NOTE — PATIENT INSTRUCTIONS
10/5/20 - I called patient after VV to schedule follow up twice. No answer. I left a message on machine with follow up VV appointment date/time in one year. I asked that patient call us back if date/time doesn't work or if she prefers a face to face appointment next time. I also asked that patient call back with what DME company she would like the DME order faxed to. Our phone number was provided.  - ATIYA

## 2020-10-05 NOTE — PROGRESS NOTES
10/5/2020    TELEHEALTH EVALUATION -- Audio/Visual (During PDFWV-56 public health emergency)    Patient and physician are located in their individual locations. This is visit is completed via [employer] application []/ Doxy. me[] / Telephone [x]      HPI:    Charlie Salgado (:  1970) has requested an audio/video evaluation for the following concern(s):    Attempted Doxy visit however no audio from patient. Converted to telephone visit. Since her last visit a year ago her asthma is been under fair control. Uses Symbicort, albuterol, and Singulair. Continues to smoke. Claims that she is committed to quit. Wishes to go back on Chantix. Short of breath with moderate to heavy exertion. Cough and wheeze. Both nocturnal and exercise-induced symptoms. Needs new tubing for her aerosol nebulizer. States that she already got a flu shot. Asking for a prescription for a azithromycin in case she has a purulent exacerbation. Review of Systems   Constitutional: Negative. HENT: Negative. Eyes: Negative. Respiratory: Positive for cough, chest tightness, shortness of breath and wheezing. Cardiovascular: Negative. Gastrointestinal: Negative. All other systems reviewed and are negative. Prior to Visit Medications    Medication Sig Taking?  Authorizing Provider   budesonide-formoterol (SYMBICORT) 160-4.5 MCG/ACT AERO Inhale 2 puffs into the lungs 2 times daily Yes Eddie Hudson, DO   montelukast (SINGULAIR) 10 MG tablet Take 1 tablet by mouth nightly Yes Eddie Hudson DO   albuterol sulfate  (90 Base) MCG/ACT inhaler inhale 2 puffs by mouth and INTO THE LUNGS every 6 hours if needed for wheezing or shortness of breath Yes Eddie Hudson, DO   azithromycin (ZITHROMAX) 250 MG tablet Take 1 tablet by mouth See Admin Instructions for 5 days 500mg on day 1 followed by 250mg on days 2 - 5 Yes Eddie Hudson DO   varenicline (CHANTIX CONTINUING MONTH ) 1 MG tablet Take 1 tablet by mouth daily Yes Saira Chaves DO   varenicline (CHANTIX) 0.5 MG tablet Take 1-2 tablets by mouth See Admin Instructions 0.5mg DAILY for 3 days followed by 0.5mg TWICE DAILY for 4 days followed by 1mg TWICE DAILY Yes Andree Hudson,    hydroCHLOROthiazide (HYDRODIURIL) 25 MG tablet take 1 tablet by mouth once daily Yes Duran Du MD   omeprazole (PRILOSEC) 20 MG delayed release capsule take 1 capsule by mouth once daily Yes Cholo De Dios MD   cetirizine (ZYRTEC) 10 MG tablet take 1 tablet by mouth once daily Yes Cholo De Dios MD   ibuprofen (ADVIL;MOTRIN) 800 MG tablet Take 1 tablet by mouth every 6 hours as needed for Pain Yes Duran Du MD   Elastic Bandages & Supports (WRAP/PAIN FREE) MISC Wrap for right arm. Apply as needed. Yes Fawad Pepe MD   cyclobenzaprine (FLEXERIL) 5 MG tablet take 1 tablet by mouth twice a day Yes Steffen Ocampo MD   acetaminophen (TYLENOL) 500 MG tablet Take 1 tablet by mouth 4 times daily as needed for Pain Yes Josefina Prince DO   lidocaine (LIDODERM) 5 % Place 1 patch onto the skin daily 12 hours on, 12 hours off.  Yes Josefina Prince DO   albuterol (PROVENTIL) (2.5 MG/3ML) 0.083% nebulizer solution Take 3 mLs by nebulization every 6 hours as needed for Wheezing Yes RONI Dee CNP   fluticasone (FLONASE) 50 MCG/ACT nasal spray 2 sprays by Each Nare route daily Yes Jennifer Ramsey MD   nystatin (MYCOSTATIN) 378581 UNIT/ML suspension swish and swallow 2.5 milliliters ON EACH SIDE OF MOUTH four times a day - CONTINUE TO USE FOR 2 DAYS AFTER THRUSH RESOLVES Yes Cholo De Dios MD   Handicap Placard MISC by Does not apply route For 3 years Yes Jennifer Ramsey MD   nicotine (NICODERM CQ) 14 MG/24HR Place 1 patch onto the skin daily for 14 days  RONI Dee CNP   nicotine (NICODERM CQ) 7 MG/24HR Place 1 patch onto the skin daily for 14 days  RONI Dee - CNP   nicotine (NICODERM CQ) 21 MG/24HR Place 1 patch onto the skin every 24 hours  Jennifer Ramsey MD       Social

## 2020-10-12 NOTE — TELEPHONE ENCOUNTER
Last visit: 08/25/20  Last Med refill:   Does patient have enough medication for 72 hours:     Next Visit Date:  Future Appointments   Date Time Provider Hernandez Amezcua   10/4/2021  1:30 PM Manoj Schreiber DO Resp Spec [address] Maintenance   Topic Date Due    Hepatitis A vaccine (1 of 2 - Risk 2-dose series) 07/29/1970    Hepatitis B vaccine (1 of 3 - Risk 3-dose series) 07/29/1988    Cervical cancer screen  06/20/2017    Breast cancer screen  07/29/2019    Shingles Vaccine (1 of 2) 07/29/2019    Colon cancer screen colonoscopy  07/29/2019    Potassium monitoring  12/03/2019    Creatinine monitoring  12/03/2019    Flu vaccine (1) 09/01/2020    DTaP/Tdap/Td vaccine (2 - Td) 01/11/2023    Lipid screen  08/24/2025    Pneumococcal 0-64 years Vaccine  Completed    HIV screen  Completed    Hib vaccine  Aged Out    Meningococcal (ACWY) vaccine  Aged Out       No results found for: LABA1C          ( goal A1C is < 7)   Microalb/Crt.  Ratio (mcg/mg creat)   Date Value   07/29/2015 4     LDL Cholesterol (mg/dL)   Date Value   08/24/2020 58   07/29/2015 59       (goal LDL is <100)   AST (U/L)   Date Value   06/20/2014 27     ALT (U/L)   Date Value   06/20/2014 27     BUN (mg/dL)   Date Value   12/03/2018 8     BP Readings from Last 3 Encounters:   08/25/20 116/83   07/23/20 133/84   03/18/20 121/80          (goal 120/80)    All Future Testing planned in [facility]  Lab Frequency Next Occurrence   CBC Auto Differential Once 08/25/2020   Comprehensive Metabolic Panel Once 39/32/8903   Ethanol Once 08/25/2020   Hepatitis A Antibody, Total Once 08/25/2020   Hepatitis B Surface Antibody Once 08/25/2020   Hepatitis C Antibody Once 08/25/2020   HEPATITIS C GENOTYPING Once 08/25/2020   Hepatitis C RNA, quantitative, PCR Once 08/25/2020   Liver Fibrosis, Chronic Viral Hepatitis Once 08/25/2020   Urine Drug Screen Once 08/25/2020   HIV Screen Once 08/25/2020   US LIVER Once 08/25/2020               Patient Active

## 2020-10-13 RX ORDER — HYDROCHLOROTHIAZIDE 25 MG/1
TABLET ORAL
Qty: 30 TABLET | Refills: 0 | Status: SHIPPED | OUTPATIENT
Start: 2020-10-13 | End: 2020-11-10 | Stop reason: SDUPTHER

## 2020-11-09 NOTE — TELEPHONE ENCOUNTER
E-scribe request for Hydrochlorothiazide 25 MG. Please review and e-scribe if applicable. Next Visit Date:  Visit date not found    Health Maintenance   Topic Date Due    Hepatitis A vaccine (1 of 2 - Risk 2-dose series) 07/29/1970    Hepatitis B vaccine (1 of 3 - Risk 3-dose series) 07/29/1988    Cervical cancer screen  06/20/2017    Breast cancer screen  07/29/2019    Shingles Vaccine (1 of 2) 07/29/2019    Colon cancer screen colonoscopy  07/29/2019    Potassium monitoring  12/03/2019    Creatinine monitoring  12/03/2019    Flu vaccine (1) 09/01/2020    DTaP/Tdap/Td vaccine (2 - Td) 01/11/2023    Lipid screen  08/24/2025    Pneumococcal 0-64 years Vaccine  Completed    HIV screen  Completed    Hib vaccine  Aged Out    Meningococcal (ACWY) vaccine  Aged Out             (applicable per patient's age: Cancer Screenings, Depression Screening, Fall Risk Screening, Immunizations)    Microalb/Crt.  Ratio (mcg/mg creat)   Date Value   07/29/2015 4     LDL Cholesterol (mg/dL)   Date Value   08/24/2020 58     AST (U/L)   Date Value   06/20/2014 27     ALT (U/L)   Date Value   06/20/2014 27     BUN (mg/dL)   Date Value   12/03/2018 8      (goal A1C is < 7)   (goal LDL is <100) need 30-50% reduction from baseline     BP Readings from Last 3 Encounters:   08/25/20 116/83   07/23/20 133/84   03/18/20 121/80    (goal /80)      All Future Testing planned in [facility]:  Lab Frequency Next Occurrence   CBC Auto Differential Once 08/25/2020   Comprehensive Metabolic Panel Once 62/46/7462   Ethanol Once 08/25/2020   Hepatitis A Antibody, Total Once 08/25/2020   Hepatitis B Surface Antibody Once 08/25/2020   Hepatitis C Antibody Once 08/25/2020   HEPATITIS C GENOTYPING Once 08/25/2020   Hepatitis C RNA, quantitative, PCR Once 08/25/2020   Liver Fibrosis, Chronic Viral Hepatitis Once 08/25/2020   Urine Drug Screen Once 08/25/2020   HIV Screen Once 08/25/2020   US LIVER Once 10/27/2020       Next Visit Date:  Future Appointments   Date Time Provider Hernandez Goldie   10/4/2021  1:30 PM Devi Geiger DO Resp Spec [id number]            Patient Active Problem List:     Asthma     COPD (chronic obstructive pulmonary disease) ([facility] [address]     Hepatitis C     GERD (gastroesophageal reflux disease)     Hyperlipidemia     HTN (hypertension)     Allergic dermatitis     Rash     Vaginal discharge     Cervicitis     Viral gastroenteritis     Eosinophilic asthma     Gassiness

## 2020-11-10 RX ORDER — HYDROCHLOROTHIAZIDE 25 MG/1
TABLET ORAL
Qty: 30 TABLET | Refills: 0 | Status: SHIPPED | OUTPATIENT
Start: 2020-11-10 | End: 2020-12-07

## 2020-11-11 RX ORDER — CETIRIZINE HYDROCHLORIDE 10 MG/1
TABLET ORAL
Qty: 30 TABLET | Refills: 3 | Status: SHIPPED | OUTPATIENT
Start: 2020-11-11 | End: 2021-05-19

## 2020-11-11 NOTE — TELEPHONE ENCOUNTER
E-scribe request for cetirizine (ZYRTEC) 10 MG. Please review and e-scribe if applicable. Last Visit Date:    Next Visit Date:  Visit date not found    No results found for: LABA1C          ( goal A1C is < 7)   Microalb/Crt.  Ratio (mcg/mg creat)   Date Value   07/29/2015 4     LDL Cholesterol (mg/dL)   Date Value   08/24/2020 58       (goal LDL is <100)   AST (U/L)   Date Value   06/20/2014 27     ALT (U/L)   Date Value   06/20/2014 27     BUN (mg/dL)   Date Value   12/03/2018 8     BP Readings from Last 3 Encounters:   08/25/20 116/83   07/23/20 133/84   03/18/20 121/80          (goal 120/80)        Patient Active Problem List:     Asthma     COPD (chronic obstructive pulmonary disease) (HCC)     Hepatitis C     GERD (gastroesophageal reflux disease)     Hyperlipidemia     HTN (hypertension)     Allergic dermatitis     Rash     Vaginal discharge     Cervicitis     Viral gastroenteritis     Eosinophilic asthma     Gassiness      ----Graciella Later

## 2020-12-07 RX ORDER — HYDROCHLOROTHIAZIDE 25 MG/1
TABLET ORAL
Qty: 30 TABLET | Refills: 2 | Status: SHIPPED | OUTPATIENT
Start: 2020-12-07 | End: 2021-03-30

## 2021-03-29 DIAGNOSIS — I10 ESSENTIAL HYPERTENSION: ICD-10-CM

## 2021-03-29 NOTE — TELEPHONE ENCOUNTER
([facility] [address]     Hepatitis C     GERD (gastroesophageal reflux disease)     Hyperlipidemia     HTN (hypertension)     Allergic dermatitis     Rash     Vaginal discharge     Cervicitis     Viral gastroenteritis     Eosinophilic asthma     Gassiness

## 2021-03-30 RX ORDER — HYDROCHLOROTHIAZIDE 25 MG/1
TABLET ORAL
Qty: 30 TABLET | Refills: 2 | Status: SHIPPED | OUTPATIENT
Start: 2021-03-30 | End: 2021-08-31 | Stop reason: SDUPTHER

## 2021-05-10 RX ORDER — OMEPRAZOLE 20 MG/1
CAPSULE, DELAYED RELEASE ORAL
Qty: 30 CAPSULE | Refills: 3 | Status: SHIPPED | OUTPATIENT
Start: 2021-05-10 | End: 2022-11-02 | Stop reason: SDUPTHER

## 2021-05-10 NOTE — TELEPHONE ENCOUNTER
E-scribe request for omeprazole. Please review and e-scribe if applicable. Last Visit Date: 8/25/2020  Next Visit Date:  Visit date not found    No results found for: LABA1C          ( goal A1C is < 7)   Microalb/Crt.  Ratio (mcg/mg creat)   Date Value   07/29/2015 4     LDL Cholesterol (mg/dL)   Date Value   08/24/2020 58       (goal LDL is <100)   AST (U/L)   Date Value   06/20/2014 27     ALT (U/L)   Date Value   06/20/2014 27     BUN (mg/dL)   Date Value   12/03/2018 8     BP Readings from Last 3 Encounters:   08/25/20 116/83   07/23/20 133/84   03/18/20 121/80          (goal 120/80)        Patient Active Problem List:     Asthma     COPD (chronic obstructive pulmonary disease) (HCC)     Hepatitis C     GERD (gastroesophageal reflux disease)     Hyperlipidemia     HTN (hypertension)     Allergic dermatitis     Rash     Vaginal discharge     Cervicitis     Viral gastroenteritis     Eosinophilic asthma     Gassiness

## 2021-05-19 DIAGNOSIS — Z76.0 MEDICATION REFILL: ICD-10-CM

## 2021-05-19 RX ORDER — CETIRIZINE HYDROCHLORIDE 10 MG/1
TABLET ORAL
Qty: 30 TABLET | Refills: 3 | Status: SHIPPED | OUTPATIENT
Start: 2021-05-19 | End: 2021-12-27

## 2021-05-19 NOTE — TELEPHONE ENCOUNTER
Zyrtec pending for refill     Health Maintenance   Topic Date Due    Hepatitis A vaccine (1 of 2 - Risk 2-dose series) Never done    COVID-19 Vaccine (1) Never done    Hepatitis B vaccine (1 of 3 - Risk 3-dose series) Never done    Cervical cancer screen  06/20/2017    Breast cancer screen  07/29/2019    Shingles Vaccine (1 of 2) Never done    Colon cancer screen colonoscopy  Never done    Flu vaccine (Season Ended) 09/01/2021    DTaP/Tdap/Td vaccine (2 - Td) 01/11/2023    Lipid screen  08/24/2025    Pneumococcal 0-64 years Vaccine (2 of 2) 07/29/2034    HIV screen  Completed    Hib vaccine  Aged Out    Meningococcal (ACWY) vaccine  Aged Out             (applicable per patient's age: Cancer Screenings, Depression Screening, Fall Risk Screening, Immunizations)    Microalb/Crt.  Ratio (mcg/mg creat)   Date Value   07/29/2015 4     LDL Cholesterol (mg/dL)   Date Value   08/24/2020 58     AST (U/L)   Date Value   06/20/2014 27     ALT (U/L)   Date Value   06/20/2014 27     BUN (mg/dL)   Date Value   12/03/2018 8      (goal A1C is < 7)   (goal LDL is <100) need 30-50% reduction from baseline     BP Readings from Last 3 Encounters:   08/25/20 116/83   07/23/20 133/84   03/18/20 121/80    (goal /80)      All Future Testing planned in [facility]:  Lab Frequency Next Occurrence   CBC Auto Differential Once 08/25/2020   Comprehensive Metabolic Panel Once 44/08/6942   Ethanol Once 08/25/2020   Hepatitis A Antibody, Total Once 08/25/2020   Hepatitis B Surface Antibody Once 08/25/2020   Hepatitis C Antibody Once 08/25/2020   Hepatitis C RNA, quantitative, PCR Once 08/25/2020   Liver Fibrosis, Chronic Viral Hepatitis Once 08/25/2020   Urine Drug Screen Once 08/25/2020   HIV Screen Once 08/25/2020   US LIVER Once 10/27/2020       Next Visit Date:  Future Appointments   Date Time Provider Hernandez Amezcua   10/4/2021  1:30 PM Luisa Dejesus DO Resp Spec [id number]            Patient Active Problem List:     Asthma COPD (chronic obstructive pulmonary disease) (HCC)     Hepatitis C     GERD (gastroesophageal reflux disease)     Hyperlipidemia     HTN (hypertension)     Allergic dermatitis     Rash     Vaginal discharge     Cervicitis     Viral gastroenteritis     Eosinophilic asthma     Gassiness

## 2021-07-23 DIAGNOSIS — J45.50 POORLY CONTROLLED SEVERE PERSISTENT ASTHMA WITHOUT COMPLICATION: ICD-10-CM

## 2021-07-23 RX ORDER — ALBUTEROL SULFATE 90 UG/1
AEROSOL, METERED RESPIRATORY (INHALATION)
Qty: 3 INHALER | Refills: 0 | Status: SHIPPED | OUTPATIENT
Start: 2021-07-23 | End: 2021-08-31 | Stop reason: SDUPTHER

## 2021-07-23 NOTE — TELEPHONE ENCOUNTER
Dr Wang Perez, patient is current and is scheduled for follow up on 10/11/21. Per last dictation patient is on this medication. Please sign for refill if ok. Thank you.

## 2021-08-20 ENCOUNTER — TELEPHONE (OUTPATIENT)
Dept: FAMILY MEDICINE CLINIC | Age: 51
End: 2021-08-20

## 2021-08-20 NOTE — TELEPHONE ENCOUNTER
----- Message from Holley Garcia sent at 8/20/2021 12:16 PM EDT -----  Subject: Message to Provider    QUESTIONS  Information for Provider? Patient called because she faxed over a medical   packet to be filled out but it was only signed she said it has to be   filled completely out please fill out and fax over. ---------------------------------------------------------------------------  --------------  Nicolasa DIXON  What is the best way for the office to contact you? OK to leave message on   voicemail  Preferred Call Back Phone Number? [phone]  ---------------------------------------------------------------------------  --------------  SCRIPT ANSWERS  Relationship to Patient?  Self

## 2021-08-31 ENCOUNTER — OFFICE VISIT (OUTPATIENT)
Dept: FAMILY MEDICINE CLINIC | Age: 51
End: 2021-08-31
Payer: COMMERCIAL

## 2021-08-31 VITALS
DIASTOLIC BLOOD PRESSURE: 78 MMHG | SYSTOLIC BLOOD PRESSURE: 120 MMHG | TEMPERATURE: 97.3 F | WEIGHT: 106.8 LBS | HEIGHT: 68 IN | BODY MASS INDEX: 16.18 KG/M2 | HEART RATE: 101 BPM

## 2021-08-31 DIAGNOSIS — Z12.11 SCREEN FOR COLON CANCER: ICD-10-CM

## 2021-08-31 DIAGNOSIS — Z12.31 ENCOUNTER FOR SCREENING MAMMOGRAM FOR MALIGNANT NEOPLASM OF BREAST: ICD-10-CM

## 2021-08-31 DIAGNOSIS — J45.50 POORLY CONTROLLED SEVERE PERSISTENT ASTHMA WITHOUT COMPLICATION: ICD-10-CM

## 2021-08-31 DIAGNOSIS — R53.82 CHRONIC FATIGUE: ICD-10-CM

## 2021-08-31 DIAGNOSIS — J44.9 CHRONIC OBSTRUCTIVE PULMONARY DISEASE, UNSPECIFIED COPD TYPE (HCC): ICD-10-CM

## 2021-08-31 DIAGNOSIS — B07.0 PLANTAR WART: ICD-10-CM

## 2021-08-31 DIAGNOSIS — R63.4 UNINTENTIONAL WEIGHT LOSS: Primary | ICD-10-CM

## 2021-08-31 DIAGNOSIS — Z72.0 TOBACCO USE: ICD-10-CM

## 2021-08-31 DIAGNOSIS — I10 ESSENTIAL HYPERTENSION: ICD-10-CM

## 2021-08-31 PROCEDURE — 3017F COLORECTAL CA SCREEN DOC REV: CPT | Performed by: STUDENT IN AN ORGANIZED HEALTH CARE EDUCATION/TRAINING PROGRAM

## 2021-08-31 PROCEDURE — 4004F PT TOBACCO SCREEN RCVD TLK: CPT | Performed by: STUDENT IN AN ORGANIZED HEALTH CARE EDUCATION/TRAINING PROGRAM

## 2021-08-31 PROCEDURE — G8419 CALC BMI OUT NRM PARAM NOF/U: HCPCS | Performed by: STUDENT IN AN ORGANIZED HEALTH CARE EDUCATION/TRAINING PROGRAM

## 2021-08-31 PROCEDURE — 3023F SPIROM DOC REV: CPT | Performed by: STUDENT IN AN ORGANIZED HEALTH CARE EDUCATION/TRAINING PROGRAM

## 2021-08-31 PROCEDURE — G8427 DOCREV CUR MEDS BY ELIG CLIN: HCPCS | Performed by: STUDENT IN AN ORGANIZED HEALTH CARE EDUCATION/TRAINING PROGRAM

## 2021-08-31 PROCEDURE — G8926 SPIRO NO PERF OR DOC: HCPCS | Performed by: STUDENT IN AN ORGANIZED HEALTH CARE EDUCATION/TRAINING PROGRAM

## 2021-08-31 PROCEDURE — 99214 OFFICE O/P EST MOD 30 MIN: CPT | Performed by: STUDENT IN AN ORGANIZED HEALTH CARE EDUCATION/TRAINING PROGRAM

## 2021-08-31 RX ORDER — ALBUTEROL SULFATE 2.5 MG/3ML
2.5 SOLUTION RESPIRATORY (INHALATION) EVERY 6 HOURS PRN
Qty: 120 EACH | Refills: 3 | Status: SHIPPED | OUTPATIENT
Start: 2021-08-31 | End: 2021-10-26 | Stop reason: SDUPTHER

## 2021-08-31 RX ORDER — BUDESONIDE AND FORMOTEROL FUMARATE DIHYDRATE 160; 4.5 UG/1; UG/1
2 AEROSOL RESPIRATORY (INHALATION) 2 TIMES DAILY
Qty: 3 INHALER | Refills: 3 | Status: SHIPPED | OUTPATIENT
Start: 2021-08-31 | End: 2021-10-26 | Stop reason: SDUPTHER

## 2021-08-31 RX ORDER — HYDROCHLOROTHIAZIDE 25 MG/1
TABLET ORAL
Qty: 30 TABLET | Refills: 2 | Status: SHIPPED | OUTPATIENT
Start: 2021-08-31 | End: 2021-11-23

## 2021-08-31 RX ORDER — ALBUTEROL SULFATE 90 UG/1
AEROSOL, METERED RESPIRATORY (INHALATION)
Qty: 3 INHALER | Refills: 0 | Status: SHIPPED | OUTPATIENT
Start: 2021-08-31 | End: 2021-10-26 | Stop reason: SDUPTHER

## 2021-08-31 ASSESSMENT — PATIENT HEALTH QUESTIONNAIRE - PHQ9
SUM OF ALL RESPONSES TO PHQ QUESTIONS 1-9: 0
1. LITTLE INTEREST OR PLEASURE IN DOING THINGS: 0
SUM OF ALL RESPONSES TO PHQ QUESTIONS 1-9: 0
SUM OF ALL RESPONSES TO PHQ QUESTIONS 1-9: 0
2. FEELING DOWN, DEPRESSED OR HOPELESS: 0
SUM OF ALL RESPONSES TO PHQ9 QUESTIONS 1 & 2: 0

## 2021-08-31 ASSESSMENT — ENCOUNTER SYMPTOMS
DIARRHEA: 0
ABDOMINAL PAIN: 0
SHORTNESS OF BREATH: 0
NAUSEA: 0
VOMITING: 0

## 2021-08-31 NOTE — PROGRESS NOTES
Visit Information    Have you changed or started any medications since your last visit including any over-the-counter medicines, vitamins, or herbal medicines? no   Have you stopped taking any of your medications? Is so, why? -  no  Are you having any side effects from any of your medications? - no    Have you seen any other physician or provider since your last visit?  no   Have you had any other diagnostic tests since your last visit?  no   Have you been seen in the emergency room and/or had an admission in a hospital since we last saw you?  no   Have you had your routine dental cleaning in the past 6 months?  no     Do you have an active MyChart account? If no, what is the barrier?   Yes    Patient Care Team:  Royce Mina MD as PCP - General (Family Medicine)  Omar Ritchie DO as Consulting Physician (Pulmonology)    Medical History Review  Past Medical, Family, and Social History reviewed and does not contribute to the patient presenting condition    Health Maintenance   Topic Date Due    COVID-19 Vaccine (1) Never done    Hepatitis B vaccine (1 of 3 - Risk 3-dose series) Never done    Colon cancer screen colonoscopy  Never done    Cervical cancer screen  06/20/2017    Breast cancer screen  07/29/2019    Shingles Vaccine (1 of 2) Never done    Flu vaccine (1) 09/01/2021    DTaP/Tdap/Td vaccine (2 - Td or Tdap) 01/11/2023    Lipid screen  08/24/2025    Pneumococcal 0-64 years Vaccine (2 of 2 - PPSV23) 07/29/2034    HIV screen  Completed    Hepatitis A vaccine  Aged Out    Hib vaccine  Aged Out    Meningococcal (ACWY) vaccine  Aged Out

## 2021-08-31 NOTE — PROGRESS NOTES
Attending Physician Statement  I have discussed the care of Carmen Shell, including pertinent history and exam findings,  with the resident. I have seen and examined the patient and the key elements of all parts of the encounter have been performed by me. I agree with the assessment, plan and orders as documented by the resident. [facility] Modifier)I agree with the residents diagnoses of Weight loss/Fatigue/COPD and Astma and hypertension and Tobacco Abuse and the residents treatment plan as documented who spent more than 30-39 minutes and half of the time was spent on counseling about diet,exercise, life style changes and care coordination.     Stalin Gonzales MD

## 2021-08-31 NOTE — PROGRESS NOTES
Subjective:    Tonny Rangel is a 45 y.o. female with  has a past medical history of Allergic rhinitis, Asthma, COPD (chronic obstructive pulmonary disease) ([address] GERD (gastroesophageal reflux disease), Hepatitis C, and Hyperlipidemia. Presented to the office today for:  Chief Complaint   Patient presents with    Weight Loss     extreme unsure of when it started     Genital Warts     foot bottom right        HPI    45year old female here with concern for unintentional weight loss  - stated she noticed is about 6 months to 1 year ago  - Fatigue  - Eats everything, however thinks she is not eating enough. Has low appetite  -Working all day, stated running/lifting thinks all day at work  -Smoked 26 years, quit smoking for 6 years. - will update cancer screenings    HTN - BP well controlled    Tobacco use - current smoker, would like to try nicorette gum. Plantar wart - sole of left foot     COPD/asthma - stable. Would like refills of inhalers. Review of Systems   Constitutional: Positive for fatigue and unexpected weight change (20 lbs unintentional weightloss in 1 year). Negative for chills and fever. Respiratory: Negative for shortness of breath. Cardiovascular: Negative for chest pain. Gastrointestinal: Negative for abdominal pain, diarrhea, nausea and vomiting. The patient has a   Family History   Problem Relation Age of Onset    Heart Disease Mother        Objective:    /78   Pulse 101   Temp 97.3 °F (36.3 °C)   Ht 5' 8\" (1.727 m)   Wt 106 lb 12.8 oz (48.4 kg)   LMP 08/31/2021   BMI 16.24 kg/m²    BP Readings from Last 3 Encounters:   08/31/21 120/78   08/25/20 116/83   07/23/20 133/84       Physical Exam  Constitutional:       General: She is not in acute distress. HENT:      Head: Normocephalic and atraumatic. Cardiovascular:      Rate and Rhythm: Normal rate and regular rhythm. Pulses: Normal pulses. Heart sounds: Normal heart sounds. Pulmonary:      Effort: Pulmonary effort is normal. No respiratory distress. Breath sounds: Normal breath sounds. No wheezing. Abdominal:      Palpations: Abdomen is soft. Tenderness: There is no abdominal tenderness. There is no guarding. Skin:     General: Skin is warm and dry. Neurological:      General: No focal deficit present. Mental Status: She is alert and oriented to person, place, and time. Lab Results   Component Value Date    WBC 6.7 06/20/2014    HGB 13.2 06/20/2014    HCT 39.2 06/20/2014     06/20/2014    CHOL 235 (H) 08/24/2020    TRIG 91 08/24/2020     08/24/2020    ALT 27 06/20/2014    AST 27 06/20/2014     12/03/2018    K 4.0 12/03/2018    CL 99 12/03/2018    CREATININE 0.88 12/03/2018    BUN 8 12/03/2018    CO2 24 12/03/2018    TSH 1.06 08/29/2011    INR 0.9 08/29/2011    LABMICR 4 07/29/2015     Lab Results   Component Value Date    CALCIUM 9.9 12/03/2018    PHOS 3.4 08/29/2011     Lab Results   Component Value Date    LDLCHOLESTEROL 58 08/24/2020       Assessment and Plan:    1. Unintentional weight loss  - recommend unrestricted diet  - CBC With Auto Differential; Future  - Comprehensive Metabolic w/Bili Profile; Future  - TSH With Reflex Ft4; Future  - C-Reactive Protein; Future  - Hepatitis C RNA, Quantitative, PCR; Future  - Nutritional Supplements (ENSURE COMPLETE SHAKE) LIQD; Take 1 Bottle by mouth daily Chocolate flavor. Dispense: 30 Bottle; Refill: 0  - Hemoglobin A1C; Future  - UPDATE cancer screenings    2. Chronic fatigue  - CBC With Auto Differential; Future    3. Screen for colon cancer  - Cologuard (For External Results Only); Future    4. Plantar wart  - Salicylic Acid 28 % SOLN; Apply to wart on left foot daily for up to 12 weeks. Dispense: 1 Bottle; Refill: 0    5. Chronic obstructive pulmonary disease, unspecified COPD type ([facility]  - stable  - albuterol (PROVENTIL) (2.5 MG/3ML) 0.083% nebulizer solution;  Take 3 mLs by nebulization every 6 hours as needed for Wheezing  Dispense: 120 each; Refill: 3    6. Poorly controlled severe persistent asthma without complication  - stable  - budesonide-formoterol (SYMBICORT) 160-4.5 MCG/ACT AERO; Inhale 2 puffs into the lungs 2 times daily  Dispense: 3 Inhaler; Refill: 3  - albuterol sulfate  (90 Base) MCG/ACT inhaler; inhale 2 puffs INTO THE LUNGS every 6 hours if needed for wheezing or shortness of breath  Dispense: 3 Inhaler; Refill: 0    7. Essential hypertension  - well controlled  - hydroCHLOROthiazide (HYDRODIURIL) 25 MG tablet; take 1 tablet by mouth once daily  Dispense: 30 tablet; Refill: 2    8. Encounter for screening mammogram for malignant neoplasm of breast  - TRACEY DIGITAL SCREEN W OR WO CAD BILATERAL; Future    9. Tobacco use  - nicorette gum  - counseled on cessation  - CT lung screen [Initial/Annual]; Future          Requested Prescriptions     Signed Prescriptions Disp Refills    Nutritional Supplements (ENSURE COMPLETE SHAKE) LIQD 30 Bottle 0     Sig: Take 1 Bottle by mouth daily Chocolate flavor.  Salicylic Acid 28 % SOLN 1 Bottle 0     Sig: Apply to wart on left foot daily for up to 12 weeks.     albuterol (PROVENTIL) (2.5 MG/3ML) 0.083% nebulizer solution 120 each 3     Sig: Take 3 mLs by nebulization every 6 hours as needed for Wheezing    hydroCHLOROthiazide (HYDRODIURIL) 25 MG tablet 30 tablet 2     Sig: take 1 tablet by mouth once daily    budesonide-formoterol (SYMBICORT) 160-4.5 MCG/ACT AERO 3 Inhaler 3     Sig: Inhale 2 puffs into the lungs 2 times daily    albuterol sulfate  (90 Base) MCG/ACT inhaler 3 Inhaler 0     Sig: inhale 2 puffs INTO THE LUNGS every 6 hours if needed for wheezing or shortness of breath    nicotine polacrilex (NICORETTE) 2 MG gum 110 each 3     Sig: Take 1 each by mouth as needed for Smoking cessation       Medications Discontinued During This Encounter   Medication Reason    albuterol (PROVENTIL) (2.5 MG/3ML) 0.083% nebulizer solution REORDER    budesonide-formoterol (SYMBICORT) 160-4.5 MCG/ACT AERO REORDER    hydroCHLOROthiazide (HYDRODIURIL) 25 MG tablet REORDER    albuterol sulfate  (90 Base) MCG/ACT inhaler REORDER       Sonia received counseling on the following healthy behaviors: nutrition, exercise and medication adherence    Discussed use,benefit, and side effects of prescribed medications. Barriers to medication compliance addressed. All patient questions answered. Pt voiced understanding. Return in about 2 weeks (around 9/14/2021). Disclaimer: Some orall of this note was transcribed using voice-recognition software. This may cause typographical errors occasionally. Although all effort is made to fix these errors, please do not hesitate to contact our office if there Ino Browne concern with the understanding of this note.

## 2021-08-31 NOTE — PATIENT INSTRUCTIONS
Thank you for letting us take care of you today. We hope all your questions were addressed. If a question was overlooked or something else comes to mind after you return home, please contact a member of your Care Team listed below. Your Care Team at [place] [address] is Team #2  Carin Luciano DO (Faculty)  Crystal Vences (Faculty)  Rajeev Larkin MD (Resident)  Travis Wong MD (Resident)  Kasi Washburn MD (Resident)  Ángel Lowry MD (Resident)  Kenneth Hastings MD (Resident)  Rainer Barney., JOHN Beal.,  GUTIERREZ Fernandez., [facility] office)  Laura President, [address] (Clinical Practice Manager)  Corrie Damon [facility] (Clinical Pharmacist)     Office phone number: [phone]    If you need to get in right away due to illness, please be advised we have \"Same Day\" appointments available Monday-Friday. Please call us at [phone] option #3 to schedule your \"Same Day\" appointment.

## 2021-09-01 ENCOUNTER — HOSPITAL ENCOUNTER (OUTPATIENT)
Age: 51
Setting detail: SPECIMEN
Discharge: HOME | End: 2021-09-01
Payer: COMMERCIAL

## 2021-09-01 DIAGNOSIS — R53.82 CHRONIC FATIGUE: ICD-10-CM

## 2021-09-01 DIAGNOSIS — R63.4 UNINTENTIONAL WEIGHT LOSS: ICD-10-CM

## 2021-09-01 LAB
ABSOLUTE EOS #: 0.06 K/UL (ref 0–0.44)
ABSOLUTE IMMATURE GRANULOCYTE: <0.03 K/UL (ref 0–0.3)
ABSOLUTE LYMPH #: 1.69 K/UL (ref 1.1–3.7)
ABSOLUTE MONO #: 0.78 K/UL (ref 0.1–1.2)
ALBUMIN SERPL-MCNC: 4.1 G/DL (ref 3.5–5.2)
ALBUMIN/GLOBULIN RATIO: 1.4 (ref 1–2.5)
ALP BLD-CCNC: 96 U/L (ref 35–104)
ALT SERPL-CCNC: 38 U/L (ref 5–33)
ANION GAP SERPL CALCULATED.3IONS-SCNC: 16 MMOL/L (ref 9–17)
AST SERPL-CCNC: 37 U/L
BASOPHILS # BLD: 1 % (ref 0–2)
BASOPHILS ABSOLUTE: 0.05 K/UL (ref 0–0.2)
BILIRUB SERPL-MCNC: 0.66 MG/DL (ref 0.3–1.2)
BILIRUBIN DIRECT: 0.22 MG/DL
BILIRUBIN, INDIRECT: 0.44 MG/DL (ref 0–1)
BUN BLDV-MCNC: 11 MG/DL (ref 6–20)
C-REACTIVE PROTEIN: <3 MG/L (ref 0–5)
CALCIUM SERPL-MCNC: 9.1 MG/DL (ref 8.6–10.4)
CHLORIDE BLD-SCNC: 102 MMOL/L (ref 98–107)
CO2: 22 MMOL/L (ref 20–31)
CREAT SERPL-MCNC: 0.66 MG/DL (ref 0.5–0.9)
DIFFERENTIAL TYPE: ABNORMAL
EOSINOPHILS RELATIVE PERCENT: 1 % (ref 1–4)
GFR AFRICAN AMERICAN: >60 ML/MIN
GFR NON-AFRICAN AMERICAN: >60 ML/MIN
GFR SERPL CREATININE-BSD FRML MDRD: ABNORMAL ML/MIN/{1.73_M2}
GFR SERPL CREATININE-BSD FRML MDRD: ABNORMAL ML/MIN/{1.73_M2}
GLUCOSE BLD-MCNC: 75 MG/DL (ref 70–99)
HCT VFR BLD CALC: 47.3 % (ref 36.3–47.1)
HEMOGLOBIN: 15.4 G/DL (ref 11.9–15.1)
IMMATURE GRANULOCYTES: 0 %
LYMPHOCYTES # BLD: 34 % (ref 24–43)
MCH RBC QN AUTO: 33.8 PG (ref 25.2–33.5)
MCHC RBC AUTO-ENTMCNC: 32.6 G/DL (ref 28.4–34.8)
MCV RBC AUTO: 103.7 FL (ref 82.6–102.9)
MONOCYTES # BLD: 16 % (ref 3–12)
NRBC AUTOMATED: 0 PER 100 WBC
PDW BLD-RTO: 11.9 % (ref 11.8–14.4)
PLATELET # BLD: 316 K/UL (ref 138–453)
PLATELET ESTIMATE: ABNORMAL
PMV BLD AUTO: 10.2 FL (ref 8.1–13.5)
POTASSIUM SERPL-SCNC: 3.2 MMOL/L (ref 3.7–5.3)
RBC # BLD: 4.56 M/UL (ref 3.95–5.11)
RBC # BLD: ABNORMAL 10*6/UL
SEG NEUTROPHILS: 48 % (ref 36–65)
SEGMENTED NEUTROPHILS ABSOLUTE COUNT: 2.35 K/UL (ref 1.5–8.1)
SODIUM BLD-SCNC: 140 MMOL/L (ref 135–144)
TOTAL PROTEIN: 7.1 G/DL (ref 6.4–8.3)
TSH SERPL DL<=0.05 MIU/L-ACNC: 0.75 MIU/L (ref 0.3–5)
WBC # BLD: 4.9 K/UL (ref 3.5–11.3)
WBC # BLD: ABNORMAL 10*3/UL

## 2021-09-02 LAB
DIRECT EXAM: ABNORMAL
ESTIMATED AVERAGE GLUCOSE: 77 MG/DL
HBA1C MFR BLD: 4.3 % (ref 4–6)
Lab: ABNORMAL
SPECIMEN DESCRIPTION: ABNORMAL

## 2021-09-17 ENCOUNTER — VIRTUAL VISIT (OUTPATIENT)
Dept: FAMILY MEDICINE CLINIC | Age: 51
End: 2021-09-17
Payer: COMMERCIAL

## 2021-09-17 DIAGNOSIS — E87.6 HYPOKALEMIA: Primary | ICD-10-CM

## 2021-09-17 DIAGNOSIS — B19.20 HEPATITIS C VIRUS INFECTION WITHOUT HEPATIC COMA, UNSPECIFIED CHRONICITY: ICD-10-CM

## 2021-09-17 PROCEDURE — 99422 OL DIG E/M SVC 11-20 MIN: CPT | Performed by: STUDENT IN AN ORGANIZED HEALTH CARE EDUCATION/TRAINING PROGRAM

## 2021-09-17 RX ORDER — POTASSIUM CHLORIDE 20 MEQ/1
20 TABLET, EXTENDED RELEASE ORAL DAILY
Qty: 30 TABLET | Refills: 0 | Status: SHIPPED | OUTPATIENT
Start: 2021-09-17 | End: 2021-10-14

## 2021-09-17 NOTE — PROGRESS NOTES
Visit Information    Have you changed or started any medications since your last visit including any over-the-counter medicines, vitamins, or herbal medicines? no   Are you having any side effects from any of your medications? -  no  Have you stopped taking any of your medications? Is so, why? -  no    Have you seen any other physician or provider since your last visit? No  Have you had any other diagnostic tests since your last visit? No  Have you been seen in the emergency room and/or had an admission to a hospital since we last saw you? No  Have you had your routine dental cleaning in the past 6 months? no    Have you activated your [employer] account? If not, what are your barriers?  Yes     Patient Care Team:  Brook Villeda MD as PCP - General (Family Medicine)  Be Hinds DO as Consulting Physician (Pulmonology)    Medical History Review  Past Medical, Family, and Social History reviewed and does not contribute to the patient presenting condition    Health Maintenance   Topic Date Due    COVID-19 Vaccine (1) Never done    Hepatitis B vaccine (1 of 3 - Risk 3-dose series) Never done    Cervical cancer screen  06/21/2014    Colon cancer screen colonoscopy  Never done    Breast cancer screen  07/29/2019    Shingles Vaccine (1 of 2) Never done    Creatinine monitoring  12/03/2019    Flu vaccine (1) 09/01/2021    Potassium monitoring  09/01/2022    DTaP/Tdap/Td vaccine (2 - Td or Tdap) 01/11/2023    Lipid screen  08/24/2025    Pneumococcal 0-64 years Vaccine (2 of 2 - PPSV23) 07/29/2034    HIV screen  Completed    Hepatitis A vaccine  Aged Out    Hib vaccine  Aged Out    Meningococcal (ACWY) vaccine  Aged Out

## 2021-09-17 NOTE — PATIENT INSTRUCTIONS
Thank you for letting us take care of you today. We hope all your questions were addressed. If a question was overlooked or something else comes to mind after you return home, please contact a member of your Care Team listed below. Your Care Team at [place] [address] is Team #5  Penny Hsu MD (Faculty)  Domo Gallo MD (Resident)  Dennis Paget, MD (Resident)  Abhishek Giraldo MD (Resident)  Luanne Lopez MD (Resident)  Everett Cuello., SEBASTIAN Cain., GUTIERREZ Norman., Jamie Pak., Rehan [facility] office)  Germaine Howard, [address] (Clinical Practice Manager)  Gunjan Nguyen [facility] (Clinical Pharmacist)       Office phone number: [phone]    If you need to get in right away due to illness, please be advised we have \"Same Day\" appointments available Monday-Friday. Please call us at [phone] option #3 to schedule your \"Same Day\" appointment.

## 2021-09-17 NOTE — PROGRESS NOTES
Attending Physician Statement  I have discussed the care of Isabel Lowe, 45 y.o. female,including pertinent history and exam findings,  with the resident Dr. Zaheer Hunter MD.  History:  Chief Complaint   Patient presents with    Follow-up     from labs     Patient underwent a virtual phone visit for follow up on weight loss, review labs obtained for evaluation of hepatitis C and to update health maintenance. I have reviewed the key elements of the encounter with the resident. Examination was done by resident as documented in residents note. BP Readings from Last 3 Encounters:   08/31/21 120/78   08/25/20 116/83   07/23/20 133/84     LMP 08/31/2021   Lab Results   Component Value Date    WBC 4.9 09/01/2021    HGB 15.4 (H) 09/01/2021    HCT 47.3 (H) 09/01/2021     09/01/2021    CHOL 235 (H) 08/24/2020    TRIG 91 08/24/2020     08/24/2020    ALT 38 (H) 09/01/2021    AST 37 (H) 09/01/2021     09/01/2021    K 3.2 (L) 09/01/2021     09/01/2021    CREATININE 0.66 09/01/2021    BUN 11 09/01/2021    CO2 22 09/01/2021    TSH 0.75 09/01/2021    INR 0.9 08/29/2011    LABA1C 4.3 09/01/2021    LABMICR 4 07/29/2015     Lab Results   Component Value Date    CALCIUM 9.1 09/01/2021    PHOS 3.4 08/29/2011     Lab Results   Component Value Date    LDLCHOLESTEROL 58 08/24/2020     I agree with the assessment, plan and diagnosis of    Diagnosis Orders   1. Hypokalemia  potassium chloride (KLOR-CON M) 20 MEQ extended release tablet   2.  Hepatitis C virus infection without hepatic coma, unspecified chronicity  Hepatitis C Genotyping    Protime-INR    APTT    AFP Tumor Marker    US LIVER    Hepatitis B Surface Antibody    Hepatitis B Surface Antigen    Iron And TIBC    Ferritin    Vitamin B12 & Folate    BARRIE Screen With Reflex    Mitochondrial Antibodies, M2, IgG    Smooth Muscle Antibody Evelyne Duran MD, Gastroenterology, Andry Pollard agree with  orders as documented by the resident. Recommendations:   Patient was counseled regarding active hepatitis C infection severely elevated viral load. Work-up initiated with liver ultrasound, ferritin, fetoprotein, PT, PTT, BARRIE screen, monoclonal antibody and referral provided to gastroenterology. Labs reviewed hypokalemia rest.  Routine follow-up office recommended to review above and decide on further course of care. Return in about 3 months (around 12/17/2021).    (Jose A Wiseman ) Dr. Andrei Lozano MD

## 2021-09-17 NOTE — PROGRESS NOTES
Zoya Shi is a 45 y.o. female evaluated via telephone on 9/17/2021. Consent:  She and/or health care decision maker is aware that that she may receive a bill for this telephone service, depending on her insurance coverage, and has provided verbal consent to proceed: Yes      Documentation:  I communicated with the patient and/or health care decision maker about lab work follow up/ hepatitis C. Details of this discussion including any medical advice provided:     45year old female scheduled for phone appointment to discuss here lab results. Patient informed about her hypokalemia likely related to inadequate PO intake. Will send potassium replacement to her pharmacy. Patient also informed about known hepatitis C diagnosis. Will order additional workup and will provided referral to GI. All questions answered. Patient counseled on the importance of following up with the additional workup and referral. No other concerns at this time      I affirm this is a Patient Initiated Episode with a Patient who has not had a related appointment within my department in the past 7 days or scheduled within the next 24 hours. Patient identification was verified at the start of the visit: Yes    Total Time: minutes: 11-20 minutes    The visit was conducted pursuant to the emergency declaration under the [address] [place] [address], [address] [place] [address] authority and the [employer] General Act. Patient identification was verified, and a caregiver was present when appropriate. The patient was located in a state where the provider was credentialed to provide care.     Note: not billable if this call serves to triage the patient into an appointment for the relevant concern      Butch Farias MD

## 2021-09-23 ENCOUNTER — TELEPHONE (OUTPATIENT)
Dept: FAMILY MEDICINE CLINIC | Age: 51
End: 2021-09-23

## 2021-09-23 NOTE — TELEPHONE ENCOUNTER
----- Message from Nick Pritchard sent at 9/23/2021  2:29 PM EDT -----  Subject: Message to Provider    QUESTIONS  Information for Provider? Patient called to ask if the paper [facility]   has been filled out. She is worried that they are going to shut her   electric off today or tomorrow. ---------------------------------------------------------------------------  --------------  Maria Alejandra DIXON  What is the best way for the office to contact you? OK to leave message on   voicemail  Preferred Call Back Phone Number?  [phone]  ---------------------------------------------------------------------------  --------------  SCRIPT ANSWERS  undefined

## 2021-09-27 ENCOUNTER — TELEPHONE (OUTPATIENT)
Dept: FAMILY MEDICINE CLINIC | Age: 51
End: 2021-09-27

## 2021-09-27 DIAGNOSIS — Z12.11 SCREEN FOR COLON CANCER: ICD-10-CM

## 2021-10-11 DIAGNOSIS — J45.50 POORLY CONTROLLED SEVERE PERSISTENT ASTHMA WITHOUT COMPLICATION: ICD-10-CM

## 2021-10-11 RX ORDER — MONTELUKAST SODIUM 10 MG/1
TABLET ORAL
Qty: 30 TABLET | Refills: 2 | Status: SHIPPED | OUTPATIENT
Start: 2021-10-11 | End: 2021-10-26 | Stop reason: SDUPTHER

## 2021-10-11 NOTE — TELEPHONE ENCOUNTER
Health Maintenance   Topic Date Due    COVID-19 Vaccine (1) Never done    Hepatitis B vaccine (1 of 3 - Risk 3-dose series) Never done    Cervical cancer screen  06/20/2017    Breast cancer screen  07/29/2019    Shingles Vaccine (1 of 2) Never done    Creatinine monitoring  12/03/2019    Flu vaccine (1) 09/01/2021    Potassium monitoring  09/01/2022    DTaP/Tdap/Td vaccine (2 - Td or Tdap) 01/11/2023    Colon cancer screen fecal DNA test (Cologuard)  09/16/2024    Lipid screen  08/24/2025    Pneumococcal 0-64 years Vaccine (2 of 2 - PPSV23) 07/29/2034    HIV screen  Completed    Hepatitis A vaccine  Aged Out    Hib vaccine  Aged Out    Meningococcal (ACWY) vaccine  Aged Out             (applicable per patient's age: Cancer Screenings, Depression Screening, Fall Risk Screening, Immunizations)    Hemoglobin A1C (%)   Date Value   09/01/2021 4.3     Microalb/Crt.  Ratio (mcg/mg creat)   Date Value   07/29/2015 4     LDL Cholesterol (mg/dL)   Date Value   08/24/2020 58     AST (U/L)   Date Value   09/01/2021 37 (H)     ALT (U/L)   Date Value   09/01/2021 38 (H)     BUN (mg/dL)   Date Value   09/01/2021 11      (goal A1C is < 7)   (goal LDL is <100) need 30-50% reduction from baseline     BP Readings from Last 3 Encounters:   08/31/21 120/78   08/25/20 116/83   07/23/20 133/84    (goal /80)      All Future Testing planned in [facility]:  Lab Frequency Next Occurrence   TRACEY DIGITAL SCREEN W OR WO CAD BILATERAL Once 10/31/2022   CT lung screen [Initial/Annual] Once 08/31/2022   Hepatitis C Genotyping Once 09/17/2022   Protime-INR Once 09/17/2022   APTT Once 09/17/2022   AFP Tumor Marker Once 09/17/2022   US LIVER Once 09/17/2022   Hepatitis B Surface Antibody Once 09/17/2022   Hepatitis B Surface Antigen Once 09/17/2022   Iron And TIBC Once 09/17/2022   Ferritin Once 09/17/2022   Vitamin B12 & Folate Once 09/17/2022   BARRIE Screen With Reflex Once 09/17/2022   Mitochondrial Antibodies, M2, IgG Once 09/17/2022   Smooth Muscle Antibody Quant Once 09/17/2022       Next Visit Date:  Future Appointments   Date Time Provider Hernandez Amezcua   10/14/2021  1:15 PM STA DIAG MAMMO RM 3 STAZ MAMMO STA Radiolog   10/21/2021  9:30 AM STV US GE XD CLEAR STVZ US STV Radiolog   10/25/2021  1:15 PM Jenifer Tadeo DO Resp Spec [id number]            Patient Active Problem List:     Asthma     COPD (chronic obstructive pulmonary disease) (HCC)     Hepatitis C     GERD (gastroesophageal reflux disease)     Hyperlipidemia     HTN (hypertension)     Allergic dermatitis     Rash     Vaginal discharge     Cervicitis     Viral gastroenteritis     Eosinophilic asthma     Gassiness     Unintentional weight loss     Chronic fatigue     Plantar wart     Poorly controlled severe persistent asthma without complication     Tobacco use

## 2021-10-14 ENCOUNTER — HOSPITAL ENCOUNTER (OUTPATIENT)
Dept: MAMMOGRAPHY | Age: 51
Discharge: HOME | End: 2021-10-16
Payer: COMMERCIAL

## 2021-10-14 DIAGNOSIS — Z12.31 ENCOUNTER FOR SCREENING MAMMOGRAM FOR MALIGNANT NEOPLASM OF BREAST: ICD-10-CM

## 2021-10-14 DIAGNOSIS — E87.6 HYPOKALEMIA: ICD-10-CM

## 2021-10-14 PROCEDURE — 77063 BREAST TOMOSYNTHESIS BI: CPT

## 2021-10-14 RX ORDER — POTASSIUM CHLORIDE 20 MEQ/1
TABLET, EXTENDED RELEASE ORAL
Qty: 30 TABLET | Refills: 0 | Status: SHIPPED | OUTPATIENT
Start: 2021-10-14

## 2021-10-14 NOTE — TELEPHONE ENCOUNTER
E-scribe request for potassium chloride. Please review and e-scribe if applicable. Last Visit Date:  09/27/2021  Next Visit Date:  Visit date not found    Hemoglobin A1C (%)   Date Value   09/01/2021 4.3             ( goal A1C is < 7)   Microalb/Crt.  Ratio (mcg/mg creat)   Date Value   07/29/2015 4     LDL Cholesterol (mg/dL)   Date Value   08/24/2020 58       (goal LDL is <100)   AST (U/L)   Date Value   09/01/2021 37 (H)     ALT (U/L)   Date Value   09/01/2021 38 (H)     BUN (mg/dL)   Date Value   09/01/2021 11     BP Readings from Last 3 Encounters:   08/31/21 120/78   08/25/20 116/83   07/23/20 133/84          (goal 120/80)        Patient Active Problem List:     Asthma     COPD (chronic obstructive pulmonary disease) (HCC)     Hepatitis C     GERD (gastroesophageal reflux disease)     Hyperlipidemia     HTN (hypertension)     Allergic dermatitis     Rash     Vaginal discharge     Cervicitis     Viral gastroenteritis     Eosinophilic asthma     Gassiness     Unintentional weight loss     Chronic fatigue     Plantar wart     Poorly controlled severe persistent asthma without complication     Tobacco use      ----Daniela Castellon

## 2021-10-26 ENCOUNTER — VIRTUAL VISIT (OUTPATIENT)
Dept: PULMONOLOGY | Age: 51
End: 2021-10-26
Payer: COMMERCIAL

## 2021-10-26 DIAGNOSIS — J20.9 ACUTE BRONCHITIS, UNSPECIFIED ORGANISM: ICD-10-CM

## 2021-10-26 DIAGNOSIS — J45.50 POORLY CONTROLLED SEVERE PERSISTENT ASTHMA WITHOUT COMPLICATION: ICD-10-CM

## 2021-10-26 DIAGNOSIS — F43.9 SITUATIONAL STRESS: ICD-10-CM

## 2021-10-26 DIAGNOSIS — F17.210 SMOKING GREATER THAN 40 PACK YEARS: Primary | ICD-10-CM

## 2021-10-26 DIAGNOSIS — J44.9 CHRONIC OBSTRUCTIVE PULMONARY DISEASE, UNSPECIFIED COPD TYPE (HCC): ICD-10-CM

## 2021-10-26 PROCEDURE — G8484 FLU IMMUNIZE NO ADMIN: HCPCS | Performed by: INTERNAL MEDICINE

## 2021-10-26 PROCEDURE — G8926 SPIRO NO PERF OR DOC: HCPCS | Performed by: INTERNAL MEDICINE

## 2021-10-26 PROCEDURE — 4004F PT TOBACCO SCREEN RCVD TLK: CPT | Performed by: INTERNAL MEDICINE

## 2021-10-26 PROCEDURE — G8419 CALC BMI OUT NRM PARAM NOF/U: HCPCS | Performed by: INTERNAL MEDICINE

## 2021-10-26 PROCEDURE — 3023F SPIROM DOC REV: CPT | Performed by: INTERNAL MEDICINE

## 2021-10-26 PROCEDURE — G8427 DOCREV CUR MEDS BY ELIG CLIN: HCPCS | Performed by: INTERNAL MEDICINE

## 2021-10-26 PROCEDURE — 3017F COLORECTAL CA SCREEN DOC REV: CPT | Performed by: INTERNAL MEDICINE

## 2021-10-26 PROCEDURE — 99213 OFFICE O/P EST LOW 20 MIN: CPT | Performed by: INTERNAL MEDICINE

## 2021-10-26 RX ORDER — BUDESONIDE AND FORMOTEROL FUMARATE DIHYDRATE 160; 4.5 UG/1; UG/1
2 AEROSOL RESPIRATORY (INHALATION) 2 TIMES DAILY
Qty: 3 EACH | Refills: 3 | Status: SHIPPED | OUTPATIENT
Start: 2021-10-26 | End: 2022-08-12 | Stop reason: SDUPTHER

## 2021-10-26 RX ORDER — MONTELUKAST SODIUM 10 MG/1
10 TABLET ORAL NIGHTLY
Qty: 90 TABLET | Refills: 3 | Status: SHIPPED | OUTPATIENT
Start: 2021-10-26 | End: 2022-10-26

## 2021-10-26 RX ORDER — ALBUTEROL SULFATE 90 UG/1
AEROSOL, METERED RESPIRATORY (INHALATION)
Qty: 3 EACH | Refills: 3 | Status: SHIPPED | OUTPATIENT
Start: 2021-10-26

## 2021-10-26 RX ORDER — ALBUTEROL SULFATE 2.5 MG/3ML
2.5 SOLUTION RESPIRATORY (INHALATION) EVERY 6 HOURS PRN
Qty: 120 EACH | Refills: 11 | Status: SHIPPED | OUTPATIENT
Start: 2021-10-26

## 2021-10-26 RX ORDER — AZITHROMYCIN 250 MG/1
250 TABLET, FILM COATED ORAL SEE ADMIN INSTRUCTIONS
Qty: 6 TABLET | Refills: 0 | Status: SHIPPED | OUTPATIENT
Start: 2021-10-26 | End: 2021-10-31

## 2021-10-26 ASSESSMENT — ENCOUNTER SYMPTOMS
GASTROINTESTINAL NEGATIVE: 1
COUGH: 1
SHORTNESS OF BREATH: 1
EYES NEGATIVE: 1
WHEEZING: 1

## 2021-10-26 NOTE — PROGRESS NOTES
10/26/2021    TELEHEALTH EVALUATION -- Audio/Visual (During [id number] public health emergency)    Patient and physician are located in their individual locations. This is visit is completed via Game[employer] application []/ Doxy. me[] / Telephone [x]     HPI:    Jhonatan Juarez (:  1970) has requested an audio/video evaluation for the following concern(s):    Follow-up visit for poorly controlled asthma. Since her visit visit a year ago her asthma has not been under good control. Continues to smoke. States that she is under a lot of stress. Apparently one of her family members  of Covid. Patient continues to express vaccine hesitancy. Strongly encouraged her to get vaccinated and stressed safety and efficacy of vaccine. Patient states she will reconsider. Patient needs refills on all of her asthma medications. Asking for a azithromycin to have on hand for purulent exacerbation. Review of Systems   Constitutional: Negative. HENT: Negative. Eyes: Negative. Respiratory: Positive for cough, shortness of breath and wheezing. Cardiovascular: Negative. Gastrointestinal: Negative. All other systems reviewed and are negative. Prior to Visit Medications    Medication Sig Taking?  Authorizing Provider   montelukast (SINGULAIR) 10 MG tablet Take 1 tablet by mouth nightly Yes Eddie Hudson DO   albuterol (PROVENTIL) (2.5 MG/3ML) 0.083% nebulizer solution Take 3 mLs by nebulization every 6 hours as needed for Wheezing Yes Martin Hudosn DO   budesonide-formoterol (SYMBICORT) 160-4.5 MCG/ACT AERO Inhale 2 puffs into the lungs 2 times daily Yes Eddie Hudson DO   albuterol sulfate  (90 Base) MCG/ACT inhaler inhale 2 puffs INTO THE LUNGS every 6 hours if needed for wheezing or shortness of breath Yes Eddie Hudson DO   azithromycin (ZITHROMAX) 250 MG tablet Take 1 tablet by mouth See Admin Instructions for 5 days 500mg on day 1 followed by 250mg on days 2 - 5 Yes Nikolai Cervantes DO potassium chloride (KLOR-CON M) 20 MEQ extended release tablet take 1 tablet by mouth once daily Yes Cass Hsu MD   Nutritional Supplements (ENSURE COMPLETE SHAKE) LIQD Take 1 Bottle by mouth daily Chocolate flavor. Yes Fawad RAJAN MD   Salicylic Acid 28 % SOLN Apply to wart on left foot daily for up to 12 weeks. Yes Fawad RAJAN MD   hydroCHLOROthiazide (HYDRODIURIL) 25 MG tablet take 1 tablet by mouth once daily Yes Leesa Veliz MD   nicotine polacrilex (NICORETTE) 2 MG gum Take 1 each by mouth as needed for Smoking cessation Yes Leesa Veliz MD   cetirizine (ZYRTEC) 10 MG tablet take 1 tablet by mouth once daily Yes Elle Maldonado MD   omeprazole (PRILOSEC) 20 MG delayed release capsule take 1 capsule by mouth once daily Yes Denny Tony MD   varenicline (CHANTIX CONTINUING MONTH SRINATH) 1 MG tablet Take 1 tablet by mouth daily Yes Chaka Frye DO   varenicline (CHANTIX) 0.5 MG tablet Take 1-2 tablets by mouth See Admin Instructions 0.5mg DAILY for 3 days followed by 0.5mg TWICE DAILY for 4 days followed by 1mg TWICE DAILY Yes Jimmy Crigler Tita,    ibuprofen (ADVIL;MOTRIN) 800 MG tablet Take 1 tablet by mouth every 6 hours as needed for Pain Yes Leesa Veliz MD   Elastic Bandages & Supports (WRAP/PAIN FREE) MISC Wrap for right arm. Apply as needed. Yes Fawad RAJAN MD   cyclobenzaprine (FLEXERIL) 5 MG tablet take 1 tablet by mouth twice a day Yes Carolynn Rm MD   acetaminophen (TYLENOL) 500 MG tablet Take 1 tablet by mouth 4 times daily as needed for Pain Yes Helen Prince DO   lidocaine (LIDODERM) 5 % Place 1 patch onto the skin daily 12 hours on, 12 hours off.  Yes Helen Prince DO   fluticasone (FLONASE) 50 MCG/ACT nasal spray 2 sprays by Each Nare route daily Yes Viral Collins MD   nystatin (MYCOSTATIN) 525879 UNIT/ML suspension swish and swallow 2.5 milliliters ON EACH SIDE OF MOUTH four times a day - CONTINUE TO USE FOR 2 DAYS AFTER THRUSH RESOLVES Yes Elvia Newman MD   Handicap Placard MISC by Does not apply route For 3 years Yes Leandra Andujar MD   nicotine (NICODERM CQ) 14 MG/24HR Place 1 patch onto the skin daily for 14 days  Juan Antonio Marquez APRN - CNP   nicotine (NICODERM CQ) 7 MG/24HR Place 1 patch onto the skin daily for 14 days  RONI Vazquez - CNP   nicotine (NICODERM CQ) 21 MG/24HR Place 1 patch onto the skin every 24 hours  Leandra Andujar MD       Social History     Tobacco Use    Smoking status: Current Every Day Smoker     Packs/day: 0.50     Types: Cigarettes     Start date: 5    Smokeless tobacco: Never Used    Tobacco comment: quit on 4/27/2011, resumed smoking 11/2018   Vaping Use    Vaping Use: Never used   Substance Use Topics    Alcohol use: Yes     Alcohol/week: 0.0 standard drinks     Comment: ocasionally, beer    Drug use: No     Comment: Smoked 1ppd since 14 y/o            RECORD REVIEW: Previous medical records were reviewed at today's visit.     Wt Readings from Last 3 Encounters:   08/31/21 106 lb 12.8 oz (48.4 kg)   08/25/20 127 lb (57.6 kg)   07/23/20 129 lb (58.5 kg)       Results for orders placed or performed during the hospital encounter of 09/01/21   CBC With Auto Differential   Result Value Ref Range    WBC 4.9 3.5 - 11.3 k/uL    RBC 4.56 3.95 - 5.11 m/uL    Hemoglobin 15.4 (H) 11.9 - 15.1 g/dL    Hematocrit 47.3 (H) 36.3 - 47.1 %    .7 (H) 82.6 - 102.9 fL    MCH 33.8 (H) 25.2 - 33.5 pg    MCHC 32.6 28.4 - 34.8 g/dL    RDW 11.9 11.8 - 14.4 %    Platelets 058 344 - 593 k/uL    MPV 10.2 8.1 - 13.5 fL    NRBC Automated 0.0 0.0 per 100 WBC    Differential Type NOT REPORTED     Seg Neutrophils 48 36 - 65 %    Lymphocytes 34 24 - 43 %    Monocytes 16 (H) 3 - 12 %    Eosinophils % 1 1 - 4 %    Basophils 1 0 - 2 %    Immature Granulocytes 0 0 %    Segs Absolute 2.35 1.50 - 8.10 k/uL    Absolute Lymph # 1.69 1.10 - 3.70 k/uL    Absolute Mono # 0.78 0.10 - 1.20 k/uL    Absolute Eos # 0.06 0.00 - 0.44 k/uL    Basophils Absolute 0.05 0.00 - 0.20 k/uL    Absolute Immature Granulocyte <0.03 0.00 - 0.30 k/uL    WBC Morphology NOT REPORTED     RBC Morphology MACROCYTOSIS PRESENT     Platelet Estimate NOT REPORTED    Comprehensive Metabolic w/Bili Profile   Result Value Ref Range    Albumin 4.1 3.5 - 5.2 g/dL    Albumin/Globulin Ratio 1.4 1.0 - 2.5    Alkaline Phosphatase 96 35 - 104 U/L    ALT 38 (H) 5 - 33 U/L    AST 37 (H) <32 U/L    Total Bilirubin 0.66 0.3 - 1.2 mg/dL    Bilirubin, Direct 0.22 <0.31 mg/dL    Bilirubin, Indirect 0.44 0.00 - 1.00 mg/dL    BUN 11 6 - 20 mg/dL    Calcium 9.1 8.6 - 10.4 mg/dL    CREATININE 0.66 0.50 - 0.90 mg/dL    Glucose 75 70 - 99 mg/dL    Total Protein 7.1 6.4 - 8.3 g/dL    Sodium 140 135 - 144 mmol/L    Potassium 3.2 (L) 3.7 - 5.3 mmol/L    Chloride 102 98 - 107 mmol/L    CO2 22 20 - 31 mmol/L    Anion Gap 16 9 - 17 mmol/L    GFR Non-African American >60 >60 mL/min    GFR African American >60 >60 mL/min    GFR Comment          GFR Staging NOT REPORTED    TSH With Reflex Ft4   Result Value Ref Range    TSH 0.75 0.30 - 5.00 mIU/L   C-Reactive Protein   Result Value Ref Range    CRP <3.0 0.0 - 5.0 mg/L   Hepatitis C RNA, Quantitative, PCR   Result Value Ref Range    Specimen Description . PLASMA     Special Requests NOT REPORTED     Direct Exam (A)      HCV RNA DETECTED 889,000 IU/ML (5.95 LOG IU/ML) This test is a sensitive method for quantitating HCV RNA viral loads in plasma. It utilizes RT-PCR in the FDA approved Roche Ampliprep/Taqman 48 System. This test is intended for detecting and quantifying HCV RNA viral loads in the range of 15 IU/mL to 20,000,000 IU/mL (1.18 log IU/mL to 7.30 log IU/mL). Patients should have confirmed HCV infection prior to RNA quantification. This test has been developed to monitor disease progression and efficacy of anti-HCV drug therapy. This test has been optimized for HCV genotypes 1-6.  Results reported to the appropriate Health Department   Hemoglobin A1C   Result Value Ref Range    Hemoglobin A1C 4.3 4.0 - 6.0 % Estimated Avg Glucose 77 mg/dL       [facility] ANURAG DIGITAL SCREEN BILATERAL    Result Date: 10/14/2021  EXAMINATION: SCREENING DIGITAL BILATERAL  MAMMOGRAM WITH TOMOSYNTHESIS 10/14/2021 TECHNIQUE: Screening mammography was performed with tomosynthesis including MLO and CC views of the bilateral breasts. Computer aided detection was used for the interpretation of this exam. COMPARISON: Bilateral mammography July 24, 2015. HISTORY: Screening. FINDINGS: There are scattered areas of fibroglandular density. There is no new dominant mass, suspicious microcalcification, or area of architectural distortion. Suggestion that the patient may have lost some weight in the interim, which is confirmed on the patient history. No mammographic evidence of malignancy. BIRADS: BIRADS - CATEGORY 1 Negative, no evidence of malignancy. Normal interval follow-up is recommended in 12 months. OVERALL ASSESSMENT - NEGATIVE A letter of notification will be sent to the patient regarding the results. The [employer] of Radiology recommends annual mammograms for women 40 years and older. PHYSICAL EXAMINATION:  Due to this being a TeleHealth encounter, evaluation of the following organ systems is limited:       ASSESSMENT:  1. Smoking greater than 40 pack years    2. Poorly controlled severe persistent asthma without complication    3. Chronic obstructive pulmonary disease, unspecified COPD type ([address]    4. Acute bronchitis, unspecified organism    5. Situational stress          Plan:   Strongly admonished her to quit smoking. Refilled asthma medication. Azithromycin Z-Champ to have on hand for purulent exacerbation. Maintain nutrition. Discussed strategies to mitigate risk of COVID-19 infection and strongly encouraged vaccination. Return in 1 year. An  electronic signature was used to authenticate this note.     --Kelli Molina,  on 10/26/2021 at 11:28 AM    9}    Pursuant to the emergency declaration under the [facility] Act and the [address] waiver authority and the [employer] Act, this Virtual  Visit was conducted, with patient's consent, to reduce the patient's risk of exposure to COVID-19 and provide continuity of care for an established patient. Services were provided through a video synchronous discussion virtually to substitute for in-person clinic visit.     _______________________________________________________________________________________________________________________________________________  FOR TELEPHONE VISITS PLEASE COMPLETE THE FOLLOWING      Consent:  She and/or health care decision maker is aware that that she may receive a bill for this telephone service, depending on her insurance coverage, and has provided verbal consent to proceed: Yes      I affirm this is a Patient Initiated Episode with an Established Patient who has not had a related appointment within my department in the past 7 days or scheduled within the next 24 hours.     Total Time: minutes: 11-20 minutes    Note: not billable if this call serves to triage the patient into an appointment for the relevant concern

## 2021-11-23 DIAGNOSIS — I10 ESSENTIAL HYPERTENSION: ICD-10-CM

## 2021-11-23 RX ORDER — HYDROCHLOROTHIAZIDE 25 MG/1
TABLET ORAL
Qty: 30 TABLET | Refills: 2 | Status: SHIPPED | OUTPATIENT
Start: 2021-11-23 | End: 2022-04-15

## 2021-12-26 DIAGNOSIS — Z76.0 MEDICATION REFILL: ICD-10-CM

## 2021-12-27 RX ORDER — CETIRIZINE HYDROCHLORIDE 10 MG/1
TABLET ORAL
Qty: 30 TABLET | Refills: 3 | Status: SHIPPED | OUTPATIENT
Start: 2021-12-27 | End: 2022-05-17 | Stop reason: SDUPTHER

## 2021-12-27 NOTE — TELEPHONE ENCOUNTER
E-scribe request for med refill. Please review and e-scribe if applicable. Last Visit Date:  09/27/2021  Next Visit Date:  Visit date not found    Hemoglobin A1C (%)   Date Value   09/01/2021 4.3             ( goal A1C is < 7)   Microalb/Crt.  Ratio (mcg/mg creat)   Date Value   07/29/2015 4     LDL Cholesterol (mg/dL)   Date Value   08/24/2020 58       (goal LDL is <100)   AST (U/L)   Date Value   09/01/2021 37 (H)     ALT (U/L)   Date Value   09/01/2021 38 (H)     BUN (mg/dL)   Date Value   09/01/2021 11     BP Readings from Last 3 Encounters:   08/31/21 120/78   08/25/20 116/83   07/23/20 133/84          (goal 120/80)        Patient Active Problem List:     Asthma     COPD (chronic obstructive pulmonary disease) (HCC)     Hepatitis C     GERD (gastroesophageal reflux disease)     Hyperlipidemia     HTN (hypertension)     Allergic dermatitis     Rash     Vaginal discharge     Cervicitis     Viral gastroenteritis     Eosinophilic asthma     Gassiness     Unintentional weight loss     Chronic fatigue     Plantar wart     Poorly controlled severe persistent asthma without complication     Tobacco use      ----Terence Maloney

## 2022-01-03 ENCOUNTER — NURSE TRIAGE (OUTPATIENT)
Dept: OTHER | Facility: CLINIC | Age: 52
End: 2022-01-03

## 2022-01-03 NOTE — TELEPHONE ENCOUNTER
Received call from Lily Wallace at [facility] with [facility]. Subjective: Caller states \"I Have 2 people in my home with covid. I want to get tested. \"     Current Symptoms: fatigue, sore throat, body aches    Onset: 2 days ago; sudden    Associated Symptoms: reduced activity, reduced appetite, reduced fluid intake    Pain Severity: 3/10; aching; waxing and waning    Temperature: unknown     What has been tried: no    LMP: NA Pregnant: No    Recommended disposition: call PCP now  [address] [place] [address].-hung up at [phone] to check on location patient was seen. Was told [facility] [place]. [facility]. [address] [facility] office  [id number]- Spoke with Krupa Arteaga and warm transferred patient to her        Care advice provided, patient verbalizes understanding; denies any other questions or concerns; instructed to call back for any new or worsening symptoms. Attention Provider: Thank you for allowing me to participate in the care of your patient. The patient was connected to triage in response to information provided to the ECC/PSC. Please do not respond through this encounter as the response is not directed to a shared pool.           Reason for Disposition   [1] Symptoms of COVID-19 (e.g., cough, fever, SOB, or others) AND [2] within 14 days of EXPOSURE (close contact) with diagnosed or suspected COVID-19 patient   HIGH RISK for severe COVID complications (e.g., age > 59 years, obesity with BMI > 25, pregnant, chronic lung disease or other chronic medical condition)  (Exception: Already seen by PCP and no new or worsening symptoms.)    Protocols used: COVID-19 - EXPOSURE-ADULT-AH, COVID-19 - DIAGNOSED OR SUSPECTED-ADULT-AH

## 2022-01-04 ENCOUNTER — TELEPHONE (OUTPATIENT)
Dept: PULMONOLOGY | Age: 52
End: 2022-01-04

## 2022-01-04 NOTE — TELEPHONE ENCOUNTER
Pt left message on office voicemail - [facility] never received her nebulizer order. Re faxed order and office note to [facility]- asked her to f/u with them follow day and call our office if any problem.      Faxed to [phone]

## 2022-04-15 DIAGNOSIS — I10 ESSENTIAL HYPERTENSION: ICD-10-CM

## 2022-04-15 RX ORDER — HYDROCHLOROTHIAZIDE 25 MG/1
TABLET ORAL
Qty: 30 TABLET | Refills: 2 | Status: SHIPPED | OUTPATIENT
Start: 2022-04-15 | End: 2022-07-12

## 2022-04-15 NOTE — TELEPHONE ENCOUNTER
Please address the medication refill and close the encounter. If I can be of assistance, please route to the applicable pool. Thank you. Last visit: 8-  Last Med refill: 11-  Does patient have enough medication for 72 hours: No:     Next Visit Date:  No future appointments. Health Maintenance   Topic Date Due    COVID-19 Vaccine (1) Never done    Cervical cancer screen  06/20/2017    Pneumococcal 0-64 years Vaccine (2 - PCV) 03/17/2018    Shingles Vaccine (1 of 2) Never done    Creatinine monitoring  12/03/2019    Depression Screen  08/31/2022    Flu vaccine (Season Ended) 09/01/2022    Potassium monitoring  09/01/2022    DTaP/Tdap/Td vaccine (2 - Td or Tdap) 01/11/2023    Breast cancer screen  10/14/2023    Colorectal Cancer Screen  09/16/2024    Lipid screen  08/24/2025    HIV screen  Completed    Hepatitis A vaccine  Aged Out    Hepatitis B vaccine  Aged Out    Hib vaccine  Aged Out    Meningococcal (ACWY) vaccine  Aged Out       Hemoglobin A1C (%)   Date Value   09/01/2021 4.3             ( goal A1C is < 7)   Microalb/Crt.  Ratio (mcg/mg creat)   Date Value   07/29/2015 4     LDL Cholesterol (mg/dL)   Date Value   08/24/2020 58   07/29/2015 59       (goal LDL is <100)   AST (U/L)   Date Value   09/01/2021 37 (H)     ALT (U/L)   Date Value   09/01/2021 38 (H)     BUN (mg/dL)   Date Value   09/01/2021 11     BP Readings from Last 3 Encounters:   08/31/21 120/78   08/25/20 116/83   07/23/20 133/84          (goal 120/80)    All Future Testing planned in [facility]  Lab Frequency Next Occurrence   CT lung screen [Initial/Annual] Once 08/31/2022   Hepatitis C Genotyping Once 09/17/2022   Protime-INR Once 09/17/2022   APTT Once 09/17/2022   AFP Tumor Marker Once 09/17/2022   US LIVER Once 09/17/2022   Hepatitis B Surface Antibody Once 09/17/2022   Hepatitis B Surface Antigen Once 09/17/2022   Iron And TIBC Once 09/17/2022   Ferritin Once 09/17/2022   Vitamin B12 & Folate Once 09/17/2022   BARRIE Screen With Reflex Once 09/17/2022   Mitochondrial Antibodies, M2, IgG Once 09/17/2022   Smooth Muscle Antibody Quant Once 09/17/2022               Patient Active Problem List:     Asthma     COPD (chronic obstructive pulmonary disease) ([facility] [address]     Hepatitis C     GERD (gastroesophageal reflux disease)     Hyperlipidemia     HTN (hypertension)     Allergic dermatitis     Rash     Vaginal discharge     Cervicitis     Viral gastroenteritis     Eosinophilic asthma     Gassiness     Unintentional weight loss     Chronic fatigue     Plantar wart     Poorly controlled severe persistent asthma without complication     Tobacco use

## 2022-04-28 ENCOUNTER — APPOINTMENT (OUTPATIENT)
Dept: GENERAL RADIOLOGY | Age: 52
End: 2022-04-28
Payer: COMMERCIAL

## 2022-04-28 ENCOUNTER — HOSPITAL ENCOUNTER (EMERGENCY)
Age: 52
Discharge: HOME | End: 2022-04-28
Attending: EMERGENCY MEDICINE
Payer: COMMERCIAL

## 2022-04-28 VITALS
SYSTOLIC BLOOD PRESSURE: 134 MMHG | TEMPERATURE: 98.2 F | WEIGHT: 100 LBS | OXYGEN SATURATION: 99 % | DIASTOLIC BLOOD PRESSURE: 88 MMHG | BODY MASS INDEX: 14.81 KG/M2 | HEIGHT: 69 IN | HEART RATE: 93 BPM | RESPIRATION RATE: 16 BRPM

## 2022-04-28 DIAGNOSIS — M20.011 MALLET FINGER OF RIGHT HAND: ICD-10-CM

## 2022-04-28 DIAGNOSIS — B34.9 VIRAL ILLNESS: Primary | ICD-10-CM

## 2022-04-28 DIAGNOSIS — R74.01 TRANSAMINITIS: ICD-10-CM

## 2022-04-28 LAB
ABSOLUTE EOS #: 0.04 K/UL (ref 0–0.44)
ABSOLUTE IMMATURE GRANULOCYTE: <0.03 K/UL (ref 0–0.3)
ABSOLUTE LYMPH #: 1.12 K/UL (ref 1.1–3.7)
ABSOLUTE MONO #: 0.92 K/UL (ref 0.1–1.2)
ALBUMIN SERPL-MCNC: 3.6 G/DL (ref 3.5–5.2)
ALBUMIN/GLOBULIN RATIO: 1.1 (ref 1–2.5)
ALP BLD-CCNC: 110 U/L (ref 35–104)
ALT SERPL-CCNC: 57 U/L (ref 5–33)
ANION GAP SERPL CALCULATED.3IONS-SCNC: 13 MMOL/L (ref 9–17)
AST SERPL-CCNC: 72 U/L
BASOPHILS # BLD: 1 % (ref 0–2)
BASOPHILS ABSOLUTE: 0.05 K/UL (ref 0–0.2)
BILIRUB SERPL-MCNC: 0.78 MG/DL (ref 0.3–1.2)
BUN BLDV-MCNC: 11 MG/DL (ref 6–20)
CALCIUM SERPL-MCNC: 9 MG/DL (ref 8.6–10.4)
CHLORIDE BLD-SCNC: 101 MMOL/L (ref 98–107)
CO2: 24 MMOL/L (ref 20–31)
CREAT SERPL-MCNC: 0.54 MG/DL (ref 0.5–0.9)
EOSINOPHILS RELATIVE PERCENT: 1 % (ref 1–4)
GFR AFRICAN AMERICAN: >60 ML/MIN
GFR NON-AFRICAN AMERICAN: >60 ML/MIN
GFR SERPL CREATININE-BSD FRML MDRD: ABNORMAL ML/MIN/{1.73_M2}
GLUCOSE BLD-MCNC: 91 MG/DL (ref 70–99)
HCT VFR BLD CALC: 40 % (ref 36.3–47.1)
HEMOGLOBIN: 13.6 G/DL (ref 11.9–15.1)
IMMATURE GRANULOCYTES: 0 %
LYMPHOCYTES # BLD: 17 % (ref 24–43)
MCH RBC QN AUTO: 35.2 PG (ref 25.2–33.5)
MCHC RBC AUTO-ENTMCNC: 34 G/DL (ref 28.4–34.8)
MCV RBC AUTO: 103.6 FL (ref 82.6–102.9)
MONOCYTES # BLD: 14 % (ref 3–12)
NRBC AUTOMATED: 0 PER 100 WBC
PDW BLD-RTO: 12.2 % (ref 11.8–14.4)
PLATELET # BLD: 267 K/UL (ref 138–453)
PMV BLD AUTO: 9.2 FL (ref 8.1–13.5)
POTASSIUM SERPL-SCNC: 4.1 MMOL/L (ref 3.7–5.3)
PRO-BNP: 43 PG/ML
RBC # BLD: 3.86 M/UL (ref 3.95–5.11)
RBC # BLD: ABNORMAL 10*6/UL
SEG NEUTROPHILS: 67 % (ref 36–65)
SEGMENTED NEUTROPHILS ABSOLUTE COUNT: 4.64 K/UL (ref 1.5–8.1)
SODIUM BLD-SCNC: 138 MMOL/L (ref 135–144)
TOTAL PROTEIN: 6.8 G/DL (ref 6.4–8.3)
TROPONIN, HIGH SENSITIVITY: 12 NG/L (ref 0–14)
WBC # BLD: 6.8 K/UL (ref 3.5–11.3)

## 2022-04-28 PROCEDURE — 93005 ELECTROCARDIOGRAM TRACING: CPT | Performed by: EMERGENCY MEDICINE

## 2022-04-28 PROCEDURE — 73130 X-RAY EXAM OF HAND: CPT

## 2022-04-28 PROCEDURE — 99285 EMERGENCY DEPT VISIT HI MDM: CPT

## 2022-04-28 PROCEDURE — 83880 ASSAY OF NATRIURETIC PEPTIDE: CPT

## 2022-04-28 PROCEDURE — 6370000000 HC RX 637 (ALT 250 FOR IP): Performed by: EMERGENCY MEDICINE

## 2022-04-28 PROCEDURE — 80053 COMPREHEN METABOLIC PANEL: CPT

## 2022-04-28 PROCEDURE — 84484 ASSAY OF TROPONIN QUANT: CPT

## 2022-04-28 PROCEDURE — 85025 COMPLETE CBC W/AUTO DIFF WBC: CPT

## 2022-04-28 PROCEDURE — 71046 X-RAY EXAM CHEST 2 VIEWS: CPT

## 2022-04-28 RX ORDER — ACETAMINOPHEN 500 MG
1000 TABLET ORAL ONCE
Status: COMPLETED | OUTPATIENT
Start: 2022-04-28 | End: 2022-04-28

## 2022-04-28 RX ORDER — PSEUDOEPHEDRINE HYDROCHLORIDE 30 MG/1
30 TABLET ORAL EVERY 6 HOURS PRN
Qty: 28 TABLET | Refills: 0 | Status: SHIPPED | OUTPATIENT
Start: 2022-04-28 | End: 2022-05-05

## 2022-04-28 RX ORDER — PSEUDOEPHEDRINE HYDROCHLORIDE 30 MG/1
60 TABLET ORAL ONCE
Status: COMPLETED | OUTPATIENT
Start: 2022-04-28 | End: 2022-04-28

## 2022-04-28 RX ORDER — ONDANSETRON 4 MG/1
4 TABLET, ORALLY DISINTEGRATING ORAL ONCE
Status: COMPLETED | OUTPATIENT
Start: 2022-04-28 | End: 2022-04-28

## 2022-04-28 RX ORDER — IBUPROFEN 600 MG/1
600 TABLET ORAL EVERY 6 HOURS PRN
Qty: 20 TABLET | Refills: 0 | Status: SHIPPED | OUTPATIENT
Start: 2022-04-28

## 2022-04-28 RX ADMIN — PSEUDOEPHEDRINE HCL 60 MG: 30 TABLET, FILM COATED ORAL at 14:37

## 2022-04-28 RX ADMIN — ONDANSETRON 4 MG: 4 TABLET, ORALLY DISINTEGRATING ORAL at 15:18

## 2022-04-28 RX ADMIN — ACETAMINOPHEN 1000 MG: 500 TABLET ORAL at 14:29

## 2022-04-28 ASSESSMENT — PAIN SCALES - GENERAL
PAINLEVEL_OUTOF10: 6
PAINLEVEL_OUTOF10: 7

## 2022-04-28 ASSESSMENT — ENCOUNTER SYMPTOMS
COUGH: 1
DIARRHEA: 0
CHEST TIGHTNESS: 1
VOMITING: 0
ABDOMINAL PAIN: 0
NAUSEA: 1

## 2022-04-28 ASSESSMENT — PAIN - FUNCTIONAL ASSESSMENT: PAIN_FUNCTIONAL_ASSESSMENT: 0-10

## 2022-04-28 ASSESSMENT — PAIN DESCRIPTION - LOCATION: LOCATION: FINGER (COMMENT WHICH ONE)

## 2022-04-28 ASSESSMENT — PAIN DESCRIPTION - ORIENTATION: ORIENTATION: RIGHT

## 2022-04-28 NOTE — ED PROVIDER NOTES
[facility]     Emergency Department     Faculty Attestation    I performed a history and physical examination of the patient and discussed management with the resident. I reviewed the residents note and agree with the documented findings and plan of care. Any areas of disagreement are noted on the chart. I was personally present for the key portions of any procedures. I have documented in the chart those procedures where I was not present during the key portions. I have reviewed the emergency nurses triage note. I agree with the chief complaint, past medical history, past surgical history, allergies, medications, social and family history as documented unless otherwise noted below. For Physician Assistant/ Nurse Practitioner cases/documentation I have personally evaluated this patient and have completed at least one if not all key elements of the E/M (history, physical exam, and MDM). Additional findings are as noted. I have personally seen and evaluated the patient. I find the patient's history and physical exam are consistent with the NP/PA documentation. I agree with the care provided, treatment rendered, disposition and follow-up plan. This patient was evaluated in the Emergency Department for symptoms described in the history of present illness. The patient was evaluated in the context of the global COVID-19 pandemic, which necessitated consideration that the patient might be at risk for infection with the SARS-CoV-2 virus that causes COVID-19. Institutional protocols and algorithms that pertain to the evaluation of patients at risk for COVID-19 are in a state of rapid change based on information released by regulatory bodies including the CDC and federal and state organizations. These policies and algorithms were followed during the patient's care in the ED. 41-year-old female presenting with sore throat, nausea, chills. 1 day of cough, productive.   Children have also been ill in her home. Also having a sore throat. No dizziness or diarrhea. Patient has also had approximately 70 pound weight loss in the last 4 months. She is unable to keep down boost/Ensure shakes that were recommended to her by her PCP. She has had some intermittent difficulty obtaining food due to lack of a job. She states that she now has enough to eat, but has difficulty eating more than 1 meal a day. Exam:  General: Laying on the bed and awake, alert  CV: normal rate and regular rhythm  Lungs: Breathing comfortably on room air with no tachypnea, hypoxia, or increased work of breathing  Abdomen: soft, non-tender, non-distended    Plan:  Lab work-up including CBC, electrolytes, cardiac evaluation  Chest x-ray  Concern for possible underlying malignancy versus other nutritional deficiencies. Labs reveal mild transaminitis. No immediate abnormalities requiring hospital admission. Encourage patient to follow-up with her primary care doctor to be reevaluated for significant weight loss.         Edgardo Arceo MD   Attending Emergency  Physician    (Please note that portions of this note were completed with a voice recognition program. Efforts were made to edit the dictations but occasionally words are mis-transcribed.)             Edgardo Arceo MD  04/28/22 [id number]

## 2022-04-28 NOTE — Clinical Note
Emy Peterson was seen and treated in our emergency department on 4/28/2022. She may return to work on 04/30/2022. If you have any questions or concerns, please don't hesitate to call.       Laura Grovers, DO

## 2022-04-28 NOTE — ED NOTES
The following labs labeled with pt sticker and tubed to lab: by me    [] Blue     [x] Lavender   [] on ice  [x] Green/yellow  [] Green/black [] on ice  [] Yellow  [] Red  [] Pink      [] COVID-19 swab    [] Rapid  [] PCR  [] Flu swab  [] Peds Viral Panel     [] Urine Sample  [] Pelvic Cultures  [] Blood Cultures            Milka Cleaning RN  04/28/22 0610

## 2022-04-28 NOTE — ED PROVIDER NOTES
[facility] ED  Emergency Department Encounter  EmergencyMedicine Resident     Pt Name:Sonia Vieyra  MRN: [id number]  [place] 1970  Date of evaluation: 4/28/22  PCP:  Yulisa Estrada MD    CHIEF COMPLAINT       Chief Complaint   Patient presents with    Pharyngitis    Chills    Nausea       HISTORY OF PRESENT ILLNESS  (Location/Symptom, Timing/Onset, Context/Setting, Quality, Duration, Modifying Factors, Severity.)      Mo Saldana is a 45 y.o. female who presents with sore throat, nausea, chills. Patient states that she had a productive cough after waking up this morning. Patient states she has had increasing weight loss over the last couple months. Patient chronic smoker. Patient states that she has had sick contacts of her children at home. Patient decreased diet and p.o. intake. Patient does state that she has some chest pain, worsens with cough. Patient denies any vomiting or diarrhea. Patient denies any abdominal pain. Patient has a history of COPD. PAST MEDICAL / SURGICAL / SOCIAL / FAMILY HISTORY      has a past medical history of Allergic rhinitis, Asthma, COPD (chronic obstructive pulmonary disease) ([address] GERD (gastroesophageal reflux disease), Hepatitis C, and Hyperlipidemia. No additional pertinent     has no past surgical history on file. No additional pertinent    Social History     Socioeconomic History    Marital status: Single     Spouse name: Not on file    Number of children: Not on file    Years of education: Not on file    Highest education level: Not on file   Occupational History    Not on file   Tobacco Use    Smoking status: Current Every Day Smoker     Packs/day: 0.50     Types: Cigarettes     Start date: 5    Smokeless tobacco: Never Used    Tobacco comment: quit on 4/27/2011, resumed smoking 11/2018   Vaping Use    Vaping Use: Never used   Substance and Sexual Activity    Alcohol use:  Yes     Alcohol/week: 0.0 standard drinks     Comment: ocasionally, beer    Drug use: No     Comment: Smoked 1ppd since 16 y/o    Sexual activity: Yes     Partners: Male   Other Topics Concern    Not on file   Social History Narrative    Not on file     Social Determinants of Health     Financial Resource Strain:     Difficulty of Paying Living Expenses: Not on file   Food Insecurity:     Worried About Running Out of Food in the Last Year: Not on file    Ortega of Food in the Last Year: Not on file   Transportation Needs:     Lack of Transportation (Medical): Not on file    Lack of Transportation (Non-Medical): Not on file   Physical Activity:     Days of Exercise per Week: Not on file    Minutes of Exercise per Session: Not on file   Stress:     Feeling of Stress : Not on file   Social Connections:     Frequency of Communication with Friends and Family: Not on file    Frequency of Social Gatherings with Friends and Family: Not on file    Attends Confucianism Services: Not on file    Active Member of [address] [place] [address] or Organizations: Not on file    Attends Club or Organization Meetings: Not on file    Marital Status: Not on file   Intimate Partner Violence:     Fear of Current or Ex-Partner: Not on file    Emotionally Abused: Not on file    Physically Abused: Not on file    Sexually Abused: Not on file   Housing Stability:     Unable to Pay for Housing in the Last Year: Not on file    Number of [place] in the Last Year: Not on file    Unstable Housing in the Last Year: Not on file       Family History   Problem Relation Age of Onset    Heart Disease Mother        Allergies:  Patient has no known allergies. Home Medications:  Prior to Admission medications    Medication Sig Start Date End Date Taking?  Authorizing Provider   pseudoephedrine (DECONGESTANT) 30 MG tablet Take 1 tablet by mouth every 6 hours as needed for Congestion 4/28/22 5/5/22 Yes Chance Villegas DO   ibuprofen (ADVIL;MOTRIN) 600 MG tablet Take 1 tablet by mouth every 6 hours as needed for Pain 4/28/22  Yes Chance Villegas,    hydroCHLOROthiazide (HYDRODIURIL) 25 MG tablet take 1 tablet by mouth once daily 4/15/22   Angeles Ramirez MD   cetirizine (ZYRTEC) 10 MG tablet take 1 tablet by mouth once daily 12/27/21   Jacob Rollins MD   montelukast (SINGULAIR) 10 MG tablet Take 1 tablet by mouth nightly 10/26/21 10/26/22  Mack Nathan Becca, DO   albuterol (PROVENTIL) (2.5 MG/3ML) 0.083% nebulizer solution Take 3 mLs by nebulization every 6 hours as needed for Wheezing 10/26/21   Flaco Ora, DO   budesonide-formoterol (SYMBICORT) 160-4.5 MCG/ACT AERO Inhale 2 puffs into the lungs 2 times daily 10/26/21   Mack Nathan Becca, DO   albuterol sulfate  (90 Base) MCG/ACT inhaler inhale 2 puffs INTO THE LUNGS every 6 hours if needed for wheezing or shortness of breath 10/26/21   Flaco Ora, DO   potassium chloride (KLOR-CON M) 20 MEQ extended release tablet take 1 tablet by mouth once daily 10/14/21   Scout Hull MD   Nutritional Supplements (ENSURE COMPLETE SHAKE) LIQD Take 1 Bottle by mouth daily Chocolate flavor. 8/31/21   Julian Anglin MD   Salicylic Acid 28 % SOLN Apply to wart on left foot daily for up to 12 weeks. 8/31/21   Julian Anglin MD   nicotine polacrilex (NICORETTE) 2 MG gum Take 1 each by mouth as needed for Smoking cessation 8/31/21   Julian Anglin MD   omeprazole (PRILOSEC) 20 MG delayed release capsule take 1 capsule by mouth once daily 5/10/21   Nan Deng MD   varenicline (CHANTIX CONTINUING MONTH PAK) 1 MG tablet Take 1 tablet by mouth daily 10/5/20   Flaco Sanchez, DO   varenicline (CHANTIX) 0.5 MG tablet Take 1-2 tablets by mouth See Admin Instructions 0.5mg DAILY for 3 days followed by 0.5mg TWICE DAILY for 4 days followed by 1mg TWICE DAILY 10/5/20   Flaco Sanchez, DO   Elastic Bandages & Supports (WRAP/PAIN FREE) MISC Wrap for right arm. Apply as needed.  7/23/20   Julian Anglin MD   cyclobenzaprine (FLEXERIL) 5 MG tablet take 1 tablet by mouth twice a day 3/22/20   Socorro Pastrana MD   acetaminophen (TYLENOL) 500 MG tablet Take 1 tablet by mouth 4 times daily as needed for Pain 3/15/20   Wilber Prince DO   lidocaine (LIDODERM) 5 % Place 1 patch onto the skin daily 12 hours on, 12 hours off. 3/15/20   Rafiq Clark DO   nicotine (NICODERM CQ) 14 MG/24HR Place 1 patch onto the skin daily for 14 days 8/13/19 8/27/19  RONI Garcia CNP   nicotine (NICODERM CQ) 7 MG/24HR Place 1 patch onto the skin daily for 14 days 8/13/19 8/27/19  RONI Garcia CNP   fluticasone (FLONASE) 50 MCG/ACT nasal spray 2 sprays by Each Nare route daily 4/9/19   Eusebio Avendano MD   nicotine (NICODERM CQ) 21 MG/24HR Place 1 patch onto the skin every 24 hours 4/9/19 4/8/20  Eusebio Avendano MD   nystatin (MYCOSTATIN) 435861 UNIT/ML suspension swish and swallow 2.5 milliliters ON EACH SIDE OF MOUTH four times a day - CONTINUE TO USE FOR 2 DAYS AFTER THRUSH RESOLVES 12/3/18   Glenna Smith MD   Handicap Placard MISC by Does not apply route For 3 years 4/6/18   Eusebio Avendano MD       REVIEW OF SYSTEMS    (2-9 systems for level 4, 10 or more for level 5)      Review of Systems   Constitutional: Positive for chills. HENT: Negative for congestion. Respiratory: Positive for cough and chest tightness. Gastrointestinal: Positive for nausea. Negative for abdominal pain, diarrhea and vomiting. Genitourinary: Negative for dysuria. Skin: Negative for rash. Neurological: Positive for headaches. Negative for dizziness and light-headedness. PHYSICAL EXAM   (up to 7 for level 4, 8 or more for level 5)      INITIAL VITALS:   /88   Pulse 93   Temp 98.2 °F (36.8 °C) (Oral)   Resp 16   Ht 5' 8.5\" (1.74 m)   Wt 100 lb (45.4 kg)   SpO2 99%   BMI 14.98 kg/m²     Physical Exam  Constitutional:       Appearance: She is well-developed. She is ill-appearing. HENT:      Head: Normocephalic and atraumatic.       Right Ear: Tympanic membrane normal.      Left Ear: Tympanic membrane normal.      Mouth/Throat:      Mouth: Mucous membranes are moist.      Pharynx: Oropharynx is clear. No pharyngeal swelling, oropharyngeal exudate or posterior oropharyngeal erythema. Tonsils: 1+ on the right. 1+ on the left. Eyes:      Conjunctiva/sclera: Conjunctivae normal.   Cardiovascular:      Rate and Rhythm: Normal rate and regular rhythm. Pulmonary:      Effort: Pulmonary effort is normal.      Breath sounds: Normal breath sounds. No wheezing or rhonchi. Abdominal:      General: There is no distension. Palpations: Abdomen is soft. Lymphadenopathy:      Cervical: No cervical adenopathy. Skin:     General: Skin is warm and dry. Neurological:      General: No focal deficit present. Mental Status: She is alert and oriented to person, place, and time.    Psychiatric:         Mood and Affect: Mood normal.         Behavior: Behavior normal.         DIFFERENTIAL  DIAGNOSIS     PLAN (LABS / IMAGING / EKG):  Orders Placed This Encounter   Procedures    XR CHEST (2 VW)    XR HAND RIGHT (MIN 3 VIEWS)    CBC with Auto Differential    Comprehensive Metabolic Panel    Troponin    Brain Natriuretic Peptide    EKG 12 Lead       MEDICATIONS ORDERED:  Orders Placed This Encounter   Medications    acetaminophen (TYLENOL) tablet 1,000 mg    pseudoephedrine (SUDAFED) tablet 60 mg    ondansetron (ZOFRAN-ODT) disintegrating tablet 4 mg    pseudoephedrine (DECONGESTANT) 30 MG tablet     Sig: Take 1 tablet by mouth every 6 hours as needed for Congestion     Dispense:  28 tablet     Refill:  0    ibuprofen (ADVIL;MOTRIN) 600 MG tablet     Sig: Take 1 tablet by mouth every 6 hours as needed for Pain     Dispense:  20 tablet     Refill:  0       DIAGNOSTIC RESULTS / EMERGENCY DEPARTMENT COURSE / MDM   LAB RESULTS:  Results for orders placed or performed during the hospital encounter of 04/28/22   CBC with Auto Differential   Result Value Ref Range    WBC 6.8 3.5 - 11.3 k/uL    RBC 3.86 (L) 3.95 - 5.11 m/uL    Hemoglobin 13.6 11.9 - 15.1 g/dL    Hematocrit 40.0 36.3 - 47.1 %    .6 (H) 82.6 - 102.9 fL    MCH 35.2 (H) 25.2 - 33.5 pg    MCHC 34.0 28.4 - 34.8 g/dL    RDW 12.2 11.8 - 14.4 %    Platelets 397 189 - 124 k/uL    MPV 9.2 8.1 - 13.5 fL    NRBC Automated 0.0 0.0 per 100 WBC    Seg Neutrophils 67 (H) 36 - 65 %    Lymphocytes 17 (L) 24 - 43 %    Monocytes 14 (H) 3 - 12 %    Eosinophils % 1 1 - 4 %    Basophils 1 0 - 2 %    Immature Granulocytes 0 0 %    Segs Absolute 4.64 1.50 - 8.10 k/uL    Absolute Lymph # 1.12 1.10 - 3.70 k/uL    Absolute Mono # 0.92 0.10 - 1.20 k/uL    Absolute Eos # 0.04 0.00 - 0.44 k/uL    Basophils Absolute 0.05 0.00 - 0.20 k/uL    Absolute Immature Granulocyte <0.03 0.00 - 0.30 k/uL    RBC Morphology MACROCYTOSIS PRESENT    Comprehensive Metabolic Panel   Result Value Ref Range    Glucose 91 70 - 99 mg/dL    BUN 11 6 - 20 mg/dL    CREATININE 0.54 0.50 - 0.90 mg/dL    Calcium 9.0 8.6 - 10.4 mg/dL    Sodium 138 135 - 144 mmol/L    Potassium 4.1 3.7 - 5.3 mmol/L    Chloride 101 98 - 107 mmol/L    CO2 24 20 - 31 mmol/L    Anion Gap 13 9 - 17 mmol/L    Alkaline Phosphatase 110 (H) 35 - 104 U/L    ALT 57 (H) 5 - 33 U/L    AST 72 (H) <32 U/L    Total Bilirubin 0.78 0.3 - 1.2 mg/dL    Total Protein 6.8 6.4 - 8.3 g/dL    Albumin 3.6 3.5 - 5.2 g/dL    Albumin/Globulin Ratio 1.1 1.0 - 2.5    GFR Non-African American >60 >60 mL/min    GFR African American >60 >60 mL/min    GFR Comment         Troponin   Result Value Ref Range    Troponin, High Sensitivity 12 0 - 14 ng/L   Brain Natriuretic Peptide   Result Value Ref Range    Pro-BNP 43 <300 pg/mL       RADIOLOGY:  XR CHEST (2 VW)   Final Result      1. No acute cardiopulmonary abnormality. XR HAND RIGHT (MIN 3 VIEWS)   Final Result   Mallet finger deformity 5th digit D IP joint, age indeterminate. No fracture   noted.                 EKG  EKG Interpretation    Interpreted by emergency department physician    Rhythm: normal sinus   Rate: normal  Axis: normal  Ectopy: none  Conduction: normal  ST Segments: normal  T Waves: inversion in  inferior leads  Q Waves: nonspecific    Clinical Impression: non-specific EKG    Bakari Goldman,      All EKG's are interpreted by the Emergency Department Physician who either signs or Co-signs this chart in the absence of a cardiologist.    PROCEDURES:  None    CONSULTS:  None    MEDICAL DECISION MAKING:  Patient presenting with concerns for cough, that has been productive with chills. Patient has sick contacts at home. Most likely viral in nature. With cough and COPD x-ray was obtained with no signs of pneumonia. No indication for antibiotics or steroids in this patient this time. Patient provided decongestants. patient also noted to have acute deformity over the last 3 weeks to theFifth digit of her right hand, x-ray demonstrated concerns for mallet deformity. Patient placed in a splint. Patient had labs demonstrated concerns for transaminitis, discussed this with patient as she has hepatitis C and needs to be treated. Patient informed to follow-up with PCP for further treatment of her hepatitis C. Patient provided prescriptions for Sudafed and ibuprofen. Patient was discharged home in stable condition for outpatient work-up and follow-up. There is concern with patient's weight loss that there may be cancer causing her cough, discussed with patient that she has a outpatient low dose CT scan for evaluation of cancerous lesions and that she needs to complete this. Patient was agreeable with completing this. Patient was discharged home. Patient was informed to return with any worsening cough, bloody cough, any lightheadedness, dizziness, fever or chills can be controlled Tylenol or ibuprofen or any other concerns patient may have. CRITICAL CARE:  Please see attending note    FINAL IMPRESSION      1. Viral illness    2.  Mallet finger of right hand    3. Transaminitis          DISPOSITION / PLAN     DISPOSITION Decision To Discharge 04/28/2022 05:09:16 PM      PATIENT REFERRED TO:  Estephanie Gaona MD  [id number]   [address]  [address]  [phone]    Schedule an appointment as soon as possible for a visit       [address] [place] [address]  [phone]    Hand doctor for Mallet finger      DISCHARGE MEDICATIONS:  Discharge Medication List as of 4/28/2022  5:12 PM      START taking these medications    Details   pseudoephedrine (DECONGESTANT) 30 MG tablet Take 1 tablet by mouth every 6 hours as needed for Congestion, Disp-28 tablet, R-0Print             Bakari Dominguez DO  Emergency Medicine Resident    (Please note that portions of thisnote were completed with a voice recognition program.  Efforts were made to edit the dictations but occasionally words are mis-transcribed.)       Shoshana Obregon DO  Resident  04/28/22 [id number]

## 2022-04-28 NOTE — ED NOTES
Pt states she was around her son who was recently ill with similar symptoms. Pt c/o N/V, chills and a soore throat. Pt state symptoms occurred last night.       Manuelito Credit  04/28/22 [id number]

## 2022-04-28 NOTE — ED NOTES
Patient verbalizes has asthma  Using inhaler  No work of breathing     William Cortez RN  04/28/22 [id number]

## 2022-04-29 LAB
EKG ATRIAL RATE: 84 BPM
EKG P AXIS: -18 DEGREES
EKG P-R INTERVAL: 150 MS
EKG Q-T INTERVAL: 356 MS
EKG QRS DURATION: 80 MS
EKG QTC CALCULATION (BAZETT): 420 MS
EKG R AXIS: -19 DEGREES
EKG T AXIS: -20 DEGREES
EKG VENTRICULAR RATE: 84 BPM

## 2022-04-29 PROCEDURE — 93010 ELECTROCARDIOGRAM REPORT: CPT | Performed by: INTERNAL MEDICINE

## 2022-05-17 DIAGNOSIS — Z76.0 MEDICATION REFILL: ICD-10-CM

## 2022-05-17 RX ORDER — CETIRIZINE HYDROCHLORIDE 10 MG/1
TABLET ORAL
Qty: 30 TABLET | Refills: 3 | Status: SHIPPED | OUTPATIENT
Start: 2022-05-17 | End: 2022-11-02

## 2022-05-17 NOTE — TELEPHONE ENCOUNTER
E-scribe request for med refill. Please review and e-scribe if applicable. Last Visit Date:  09/17/2021  Next Visit Date:  Visit date not found    Hemoglobin A1C (%)   Date Value   09/01/2021 4.3             ( goal A1C is < 7)   Microalb/Crt.  Ratio (mcg/mg creat)   Date Value   07/29/2015 4     LDL Cholesterol (mg/dL)   Date Value   08/24/2020 58       (goal LDL is <100)   AST (U/L)   Date Value   04/28/2022 72 (H)     ALT (U/L)   Date Value   04/28/2022 57 (H)     BUN (mg/dL)   Date Value   04/28/2022 11     BP Readings from Last 3 Encounters:   04/28/22 134/88   08/31/21 120/78   08/25/20 116/83          (goal 120/80)        Patient Active Problem List:     Asthma     COPD (chronic obstructive pulmonary disease) (HCC)     Hepatitis C     GERD (gastroesophageal reflux disease)     Hyperlipidemia     HTN (hypertension)     Allergic dermatitis     Rash     Vaginal discharge     Cervicitis     Viral gastroenteritis     Eosinophilic asthma     Gassiness     Unintentional weight loss     Chronic fatigue     Plantar wart     Poorly controlled severe persistent asthma without complication     Tobacco use      ----Christiano Perez

## 2022-05-24 ENCOUNTER — HOSPITAL ENCOUNTER (OUTPATIENT)
Dept: ULTRASOUND IMAGING | Age: 52
Discharge: HOME | End: 2022-05-26
Payer: COMMERCIAL

## 2022-05-24 ENCOUNTER — HOSPITAL ENCOUNTER (OUTPATIENT)
Age: 52
Setting detail: SPECIMEN
Discharge: HOME | End: 2022-05-24

## 2022-05-24 DIAGNOSIS — B19.20 HEPATITIS C VIRUS INFECTION WITHOUT HEPATIC COMA, UNSPECIFIED CHRONICITY: ICD-10-CM

## 2022-05-24 LAB
AFP: 3.6 UG/L
FERRITIN: 467 NG/ML (ref 13–150)
FOLATE: 12.5 NG/ML
HBV SURFACE AB TITR SER: 18.8 MIU/ML
HEPATITIS B SURFACE ANTIGEN: NONREACTIVE
INR BLD: 1
IRON SATURATION: 44 % (ref 20–55)
IRON: 109 UG/DL (ref 37–145)
PARTIAL THROMBOPLASTIN TIME: 25.9 SEC (ref 20.5–30.5)
PROTHROMBIN TIME: 10.5 SEC (ref 9.1–12.3)
TOTAL IRON BINDING CAPACITY: 245 UG/DL (ref 250–450)
UNSATURATED IRON BINDING CAPACITY: 136 UG/DL (ref 112–347)
VITAMIN B-12: 578 PG/ML (ref 232–1245)

## 2022-05-24 PROCEDURE — 76705 ECHO EXAM OF ABDOMEN: CPT

## 2022-05-25 LAB
ANTI DNA DOUBLE STRANDED: 1.7 IU/ML
ANTI-NUCLEAR ANTIBODY (ANA): NEGATIVE
ENA ANTIBODIES SCREEN: 0.3 U/ML
MITOCHONDRIAL ANTIBODY: 2.5 U/ML (ref 0–4)

## 2022-05-26 LAB — SMOOTH MUSCLE ANTIBODY: 9 UNITS (ref 0–19)

## 2022-05-28 LAB — HEPATITIS C GENOTYPE: NORMAL

## 2022-07-12 DIAGNOSIS — I10 ESSENTIAL HYPERTENSION: ICD-10-CM

## 2022-07-12 RX ORDER — HYDROCHLOROTHIAZIDE 25 MG/1
TABLET ORAL
Qty: 30 TABLET | Refills: 2 | Status: SHIPPED | OUTPATIENT
Start: 2022-07-12

## 2022-07-12 NOTE — TELEPHONE ENCOUNTER
Writer left vm for patient to contact office to [facility] appt. Please address the medication refill and close the encounter. If I can be of assistance, please route to the applicable pool. Thank you. Last visit: 9-  Last Med refill: 6-16-22  Does patient have enough medication for 72 hours: No:     Next Visit Date:  Future Appointments   Date Time Provider Hernandez Amezcua   7/22/2022  2:45 PM Doroteo Lara [place] [address] [address] [facility]   Topic Date Due    COVID-19 Vaccine (1) Never done    Cervical cancer screen  06/20/2017    Pneumococcal 0-64 years Vaccine (2 - PCV) 03/17/2018    Shingles vaccine (1 of 2) Never done    Depression Screen  08/31/2022    Flu vaccine (1) 09/01/2022    DTaP/Tdap/Td vaccine (2 - Td or Tdap) 01/11/2023    Breast cancer screen  10/14/2023    Colorectal Cancer Screen  09/16/2024    Lipids  08/24/2025    HIV screen  Completed    Hepatitis A vaccine  Aged Out    Hepatitis B vaccine  Aged Out    Hib vaccine  Aged Out    Meningococcal (ACWY) vaccine  Aged Out       Hemoglobin A1C (%)   Date Value   09/01/2021 4.3             ( goal A1C is < 7)   Microalb/Crt.  Ratio (mcg/mg creat)   Date Value   07/29/2015 4     LDL Cholesterol (mg/dL)   Date Value   08/24/2020 58   07/29/2015 59       (goal LDL is <100)   AST (U/L)   Date Value   04/28/2022 72 (H)     ALT (U/L)   Date Value   04/28/2022 57 (H)     BUN (mg/dL)   Date Value   04/28/2022 11     BP Readings from Last 3 Encounters:   04/28/22 134/88   08/31/21 120/78   08/25/20 116/83          (goal 120/80)    All Future Testing planned in [facility]  Lab Frequency Next Occurrence   CT lung screen [Initial/Annual] Once 08/31/2022               Patient Active Problem List:     Asthma     COPD (chronic obstructive pulmonary disease) (HCC)     Hepatitis C     GERD (gastroesophageal reflux disease)     Hyperlipidemia     HTN (hypertension)     Allergic dermatitis     Rash     Vaginal discharge Cervicitis     Viral gastroenteritis     Eosinophilic asthma     Gassiness     Unintentional weight loss     Chronic fatigue     Plantar wart     Poorly controlled severe persistent asthma without complication     Tobacco use

## 2022-08-02 NOTE — TELEPHONE ENCOUNTER
Please address the medication refill and close the encounter. If I can be of assistance, please route to the applicable pool. Thank you. Last visit: 08/25/20  Last Med refill: 11/10/2020  Does patient have enough medication for 72 hours: No:     Next Visit Date:  Future Appointments   Date Time Provider Hernandez Amezcua   10/4/2021  1:30 PM Lily Derogelio, DO Resp Spec [address] Maintenance   Topic Date Due    Hepatitis A vaccine (1 of 2 - Risk 2-dose series) [birth year]/1970    Hepatitis B vaccine (1 of 3 - Risk 3-dose series) 07/29/1988    Cervical cancer screen  06/20/2017    Breast cancer screen  07/29/2019    Shingles Vaccine (1 of 2) 07/29/2019    Colon cancer screen colonoscopy  07/29/2019    Potassium monitoring  12/03/2019    Creatinine monitoring  12/03/2019    Flu vaccine (1) 09/01/2020    DTaP/Tdap/Td vaccine (2 - Td) 01/11/2023    Lipid screen  08/24/2025    Pneumococcal 0-64 years Vaccine  Completed    HIV screen  Completed    Hib vaccine  Aged Out    Meningococcal (ACWY) vaccine  Aged Out       No results found for: LABA1C          ( goal A1C is < 7)   Microalb/Crt.  Ratio (mcg/mg creat)   Date Value   07/29/2015 4     LDL Cholesterol (mg/dL)   Date Value   08/24/2020 58   07/29/2015 59       (goal LDL is <100)   AST (U/L)   Date Value   06/20/2014 27     ALT (U/L)   Date Value   06/20/2014 27     BUN (mg/dL)   Date Value   12/03/2018 8     BP Readings from Last 3 Encounters:   08/25/20 116/83   07/23/20 133/84   03/18/20 121/80          (goal 120/80)    All Future Testing planned in [facility]  Lab Frequency Next Occurrence   CBC Auto Differential Once 08/25/2020   Comprehensive Metabolic Panel Once 45/38/8911   Ethanol Once 08/25/2020   Hepatitis A Antibody, Total Once 08/25/2020   Hepatitis B Surface Antibody Once 08/25/2020   Hepatitis C Antibody Once 08/25/2020   HEPATITIS C GENOTYPING Once 08/25/2020   Hepatitis C RNA, quantitative, PCR Once 08/25/2020   Liver Fibrosis,
Price (Do Not Change): 0.00
Detail Level: Generalized
Instructions: This plan will send the code FBSD to the PM system.  DO NOT or CHANGE the price.

## 2022-08-12 DIAGNOSIS — J45.50 POORLY CONTROLLED SEVERE PERSISTENT ASTHMA WITHOUT COMPLICATION: ICD-10-CM

## 2022-08-12 RX ORDER — BUDESONIDE AND FORMOTEROL FUMARATE DIHYDRATE 160; 4.5 UG/1; UG/1
2 AEROSOL RESPIRATORY (INHALATION) 2 TIMES DAILY
Qty: 3 EACH | Refills: 3 | Status: SHIPPED | OUTPATIENT
Start: 2022-08-12

## 2022-08-12 RX ORDER — BUDESONIDE AND FORMOTEROL FUMARATE DIHYDRATE 160; 4.5 UG/1; UG/1
AEROSOL RESPIRATORY (INHALATION)
Qty: 30.6 G | OUTPATIENT
Start: 2022-08-12

## 2022-09-19 ENCOUNTER — OFFICE VISIT (OUTPATIENT)
Dept: GASTROENTEROLOGY | Age: 52
End: 2022-09-19
Payer: COMMERCIAL

## 2022-09-19 VITALS
DIASTOLIC BLOOD PRESSURE: 76 MMHG | WEIGHT: 106 LBS | HEIGHT: 69 IN | BODY MASS INDEX: 15.7 KG/M2 | SYSTOLIC BLOOD PRESSURE: 121 MMHG

## 2022-09-19 DIAGNOSIS — B19.20 HEPATITIS C VIRUS INFECTION WITHOUT HEPATIC COMA, UNSPECIFIED CHRONICITY: Primary | ICD-10-CM

## 2022-09-19 DIAGNOSIS — Z76.0 MEDICATION REFILL: ICD-10-CM

## 2022-09-19 PROCEDURE — G8419 CALC BMI OUT NRM PARAM NOF/U: HCPCS | Performed by: INTERNAL MEDICINE

## 2022-09-19 PROCEDURE — 3017F COLORECTAL CA SCREEN DOC REV: CPT | Performed by: INTERNAL MEDICINE

## 2022-09-19 PROCEDURE — G8427 DOCREV CUR MEDS BY ELIG CLIN: HCPCS | Performed by: INTERNAL MEDICINE

## 2022-09-19 PROCEDURE — 99203 OFFICE O/P NEW LOW 30 MIN: CPT | Performed by: INTERNAL MEDICINE

## 2022-09-19 PROCEDURE — 4004F PT TOBACCO SCREEN RCVD TLK: CPT | Performed by: INTERNAL MEDICINE

## 2022-09-19 RX ORDER — CETIRIZINE HYDROCHLORIDE 10 MG/1
TABLET ORAL
Qty: 30 TABLET | Refills: 3 | OUTPATIENT
Start: 2022-09-19

## 2022-09-19 ASSESSMENT — ENCOUNTER SYMPTOMS
COUGH: 0
NAUSEA: 0
VOICE CHANGE: 0
TROUBLE SWALLOWING: 0
WHEEZING: 0
ANAL BLEEDING: 0
VOMITING: 0
ABDOMINAL PAIN: 0
RECTAL PAIN: 0
DIARRHEA: 0
ABDOMINAL DISTENTION: 0
CONSTIPATION: 0
SHORTNESS OF BREATH: 0
BLOOD IN STOOL: 0
CHOKING: 0

## 2022-09-19 NOTE — PROGRESS NOTES
Reason for Referral: Chronic hepatitis C      Faye Huggins MD  [facility],  [facility]    Chief Complaint   Patient presents with    New Patient     Hepatitis C           HISTORY OF PRESENT ILLNESS: Ms.Stephanie Sally Giron is a 47 y.o. female with a past history remarkable for COPD, chronic GERD, hyperlipidemia, chronic hepatitis C, active smoker, referred for evaluation of hepatitis C treatment. The patient has a noncirrhotic, treatment naïve. No family history of liver disease. Believes that she acquired hepatitis C from her late  21 years ago. Smoker: 1/2 ppd x 30 yrs. Drinking history: socially   Illicit drugs: Cannibus   Abdominal surgeries: none   Prior Colonoscopy: cologaurd  Prior EGD: None   FH of GI issues: Maternal Aunt with Crohns      Past Medical,Family, and Social History reviewed and does contribute to the patient presentingcondition. Patient's PMH/PSH,SH,PSYCH Hx, MEDs, ALLERGIES, and ROS were all reviewed and updated in the appropriate sections. PAST MEDICAL HISTORY:  Past Medical History:   Diagnosis Date    Allergic rhinitis     multiple environmental allergies    Asthma     near fatal asthma, severe persistent, follows  w Dr. Mary Monzon    COPD (chronic obstructive pulmonary disease) ([facility] [address] 2011    mild stage, per Dr. Mary Monzon    GERD (gastroesophageal reflux disease)     Hepatitis C     Hyperlipidemia        No past surgical history on file. CURRENT MEDICATIONS:    Current Outpatient Medications:     budesonide-formoterol (SYMBICORT) 160-4.5 MCG/ACT AERO, Inhale 2 puffs into the lungs in the morning and 2 puffs before bedtime. , Disp: 3 each, Rfl: 3    hydroCHLOROthiazide (HYDRODIURIL) 25 MG tablet, take 1 tablet by mouth once daily, Disp: 30 tablet, Rfl: 2    cetirizine (ZYRTEC) 10 MG tablet, take 1 tablet by mouth once daily, Disp: 30 tablet, Rfl: 3    montelukast (SINGULAIR) 10 MG tablet, Take 1 tablet by mouth nightly, Disp: 90 tablet, Rfl: 3    albuterol (PROVENTIL) (2.5 MG/3ML) 0.083% nebulizer solution, Take 3 mLs by nebulization every 6 hours as needed for Wheezing, Disp: 120 each, Rfl: 11    albuterol sulfate  (90 Base) MCG/ACT inhaler, inhale 2 puffs INTO THE LUNGS every 6 hours if needed for wheezing or shortness of breath, Disp: 3 each, Rfl: 3    potassium chloride (KLOR-CON M) 20 MEQ extended release tablet, take 1 tablet by mouth once daily, Disp: 30 tablet, Rfl: 0    Salicylic Acid 28 % SOLN, Apply to wart on left foot daily for up to 12 weeks. , Disp: 1 Bottle, Rfl: 0    nicotine polacrilex (NICORETTE) 2 MG gum, Take 1 each by mouth as needed for Smoking cessation, Disp: 110 each, Rfl: 3    omeprazole (PRILOSEC) 20 MG delayed release capsule, take 1 capsule by mouth once daily, Disp: 30 capsule, Rfl: 3    varenicline (CHANTIX CONTINUING MONTH SRINATH) 1 MG tablet, Take 1 tablet by mouth daily, Disp: 30 tablet, Rfl: 5    lidocaine (LIDODERM) 5 %, Place 1 patch onto the skin daily 12 hours on, 12 hours off., Disp: 30 patch, Rfl: 0    fluticasone (FLONASE) 50 MCG/ACT nasal spray, 2 sprays by Each Nare route daily, Disp: 1 Bottle, Rfl: 0    nystatin (MYCOSTATIN) 070471 UNIT/ML suspension, swish and swallow 2.5 milliliters ON EACH SIDE OF MOUTH four times a day - CONTINUE TO USE FOR 2 DAYS AFTER THRUSH RESOLVES, Disp: 280 mL, Rfl: 2    Handicap Placard MISC, by Does not apply route For 3 years, Disp: 1 each, Rfl: 0    ibuprofen (ADVIL;MOTRIN) 600 MG tablet, Take 1 tablet by mouth every 6 hours as needed for Pain, Disp: 20 tablet, Rfl: 0    Nutritional Supplements (ENSURE COMPLETE SHAKE) LIQD, Take 1 Bottle by mouth daily Chocolate flavor., Disp: 30 Bottle, Rfl: 0    varenicline (CHANTIX) 0.5 MG tablet, Take 1-2 tablets by mouth See Admin Instructions 0.5mg DAILY for 3 days followed by 0.5mg TWICE DAILY for 4 days followed by 1mg TWICE DAILY (Patient not taking: Reported on 9/19/2022), Disp: 57 tablet, Rfl: 0    Elastic Bandages & Supports (WRAP/PAIN FREE) MISC, Wrap for right arm. Apply as needed. (Patient not taking: Reported on 9/19/2022), Disp: 1 each, Rfl: 0    cyclobenzaprine (FLEXERIL) 5 MG tablet, take 1 tablet by mouth twice a day (Patient not taking: Reported on 9/19/2022), Disp: 15 tablet, Rfl: 0    acetaminophen (TYLENOL) 500 MG tablet, Take 1 tablet by mouth 4 times daily as needed for Pain (Patient not taking: Reported on 9/19/2022), Disp: 40 tablet, Rfl: 0    nicotine (NICODERM CQ) 14 MG/24HR, Place 1 patch onto the skin daily for 14 days, Disp: 14 patch, Rfl: 0    nicotine (NICODERM CQ) 7 MG/24HR, Place 1 patch onto the skin daily for 14 days, Disp: 14 patch, Rfl: 0    nicotine (NICODERM CQ) 21 MG/24HR, Place 1 patch onto the skin every 24 hours, Disp: 30 patch, Rfl: 3    ALLERGIES:   No Known Allergies    FAMILY HISTORY:       Problem Relation Age of Onset    Heart Disease Mother          SOCIAL HISTORY:   Social History     Socioeconomic History    Marital status: Single     Spouse name: Not on file    Number of children: Not on file    Years of education: Not on file    Highest education level: Not on file   Occupational History    Not on file   Tobacco Use    Smoking status: Every Day     Packs/day: 0.50     Types: Cigarettes     Start date: 5    Smokeless tobacco: Never    Tobacco comments:     quit on 4/27/2011, resumed smoking 11/2018   Vaping Use    Vaping Use: Never used   Substance and Sexual Activity    Alcohol use:  Yes     Alcohol/week: 0.0 standard drinks     Comment: ocasionally, beer    Drug use: No     Comment: Smoked 1ppd since 14 y/o    Sexual activity: Yes     Partners: Male   Other Topics Concern    Not on file   Social History Narrative    Not on file     Social Determinants of Health     Financial Resource Strain: Not on file   Food Insecurity: Not on file   Transportation Needs: Not on file   Physical Activity: Not on file   Stress: Not on file   Social Connections: Not on file   Intimate Partner Violence: Not on file Housing Stability: Not on file         REVIEW OF SYSTEMS: A 12-point review of systems was obtained and pertinent positives and negatives were listed below. REVIEW OF SYSTEMS:     Constitutional: No fever, no chills, no lethargy, no weakness. HEENT:  No headache, otalgia, itchy eyes, nasal discharge or sore throat. Cardiac:  No chest pain, dyspnea, orthopnea or PND. Chest:   No cough, phlegm or wheezing. Abdomen:      Detailed by MA   Neuro:  No focal weakness, abnormal movements or seizure like activity. Skin:   No rashes, no itching. :   No hematuria, no pyuria, no dysuria, no flank pain. Extremities:  No swelling or joint pains. ROS was otherwise negative    Review of Systems   Constitutional:  Positive for unexpected weight change (lost). Negative for appetite change and fatigue. HENT:  Negative for trouble swallowing and voice change. Eyes:  Negative for visual disturbance. Respiratory:  Negative for cough, choking, shortness of breath and wheezing. Cardiovascular:  Negative for chest pain, palpitations and leg swelling. Gastrointestinal:  Negative for abdominal distention, abdominal pain, anal bleeding, blood in stool, constipation, diarrhea (always in the bathroom), nausea, rectal pain and vomiting. Genitourinary:  Negative for difficulty urinating. Neurological:  Negative for dizziness, seizures, weakness, numbness and headaches. Hematological:  Does not bruise/bleed easily. Psychiatric/Behavioral:  Negative for confusion and sleep disturbance. The patient is not nervous/anxious. PHYSICAL EXAMINATION: Vital signs reviewed per the nursing documentation. /76 (Site: Right Upper Arm, Position: Sitting)   Ht 5' 8.5\" (1.74 m)   Wt 106 lb (48.1 kg)   PF 81 L/min   BMI 15.88 kg/m²   Body mass index is 15.88 kg/m². Physical Exam    Physical Exam   Constitutional: Patient is oriented to person, place, and time. Patient appears well-developed and well-nourished. HENT:   Head: Normocephalic and atraumatic. Eyes: Pupils are equal, round, and reactive to light. EOM are normal.   Neck: Normal range of motion. Neck supple. No JVD present. No tracheal deviation present. No thyromegaly present. Cardiovascular: Normal rate, regular rhythm, normal heart sounds and intact distal pulses. Pulmonary/Chest: Effort normal and breath sounds normal. No stridor. No respiratory distress. He has no wheezes. He has no rales. He exhibits no tenderness. Abdominal: Soft. Bowel sounds are normal. He exhibits no distension and no mass. There is no tenderness. There is no rebound and no guarding. No hernia. Musculoskeletal: Normal range of motion. Lymphadenopathy:    Patient has no cervical adenopathy. Neurological: Patient is alert and oriented to person, place, and time. Psychiatric: Patient has a normal mood and affect. Patient behavior is normal.       LABORATORY DATA: Reviewed  Lab Results   Component Value Date    WBC 6.8 04/28/2022    HGB 13.6 04/28/2022    HCT 40.0 04/28/2022    .6 (H) 04/28/2022     04/28/2022     04/28/2022    K 4.1 04/28/2022     04/28/2022    CO2 24 04/28/2022    BUN 11 04/28/2022    CREATININE 0.54 04/28/2022    LABALBU 3.6 04/28/2022    BILITOT 0.78 04/28/2022    ALKPHOS 110 (H) 04/28/2022    AST 72 (H) 04/28/2022    ALT 57 (H) 04/28/2022    INR 1.0 05/24/2022         Lab Results   Component Value Date    RBC 3.86 (L) 04/28/2022    HGB 13.6 04/28/2022    .6 (H) 04/28/2022    MCH 35.2 (H) 04/28/2022    MCHC 34.0 04/28/2022    RDW 12.2 04/28/2022    MPV 9.2 04/28/2022    BASOPCT 1 04/28/2022    LYMPHSABS 1.12 04/28/2022    MONOSABS 0.92 04/28/2022    NEUTROABS 4.64 04/28/2022    EOSABS 0.04 04/28/2022    BASOSABS 0.05 04/28/2022         DIAGNOSTIC TESTING:     No results found.        IMPRESSION: Ms.Stephanie Marrero Venice is a 47 y.o. female with a past history remarkable for COPD, chronic GERD, hyperlipidemia, chronic hepatitis C, active smoker, referred for evaluation of hepatitis C treatment. The patient has a noncirrhotic, treatment naïve. No family history of liver disease. Believes that she acquired hepatitis C from her late  21 years ago. Assessment  1. Hepatitis C virus infection without hepatic coma, unspecified chronicity        Aundra Kelly was seen today for new patient. Diagnoses and all orders for this visit:    Hepatitis C virus infection without hepatic coma, unspecified chronicity-genotype 1a/1B, noncirrhotic. We will send approval process for Mavyret x8 weeks. To complete a serological work-up prior to starting DAA. Risk, benefits, alternative discussed with the patient. She agreed to obtain approval for this medication. Follow-up after pending blood work. -     CBC with Auto Differential; Future  -     Comprehensive Metabolic Panel with Bilirubin; Future  -     Liver Fibrosis, Chronic Viral Hepatitis; Future  -     Hepatitis A Antibody, Total; Future  -     Hepatitis C Genotype; Future  -     Hepatitis C RNA, quantitative, PCR; Future  -     Urine Drug Screen; Future  -     Ethanol; Future  -     HIV Screen; Future  -     Hepatitis B Surface Antibody; Future  -     Hepatitis B Core Antibody, Total; Future       Hepatitis C acquired presumably from prior . RTC: 3 months    Additional comments: Thank you for allowing me to participate in the care of Ms. Mak. For any further questions please do not hesitate to contact me. I have reviewed and agree with the MA/LPN ROS please refer to their documentation from today's encounter on a separate note.      Joce Ellison MD, MPH   Board Certified in Gastroenterology  Board Certified in [address] [place] [address] #: [phone]          this note is created with the assistance of a speech recognition program.  While intending to generate a document that actually reflects the content of the visit, the document can still have some errors including those of syntax and sound a like substitutions which may escape proof reading. It such instances, actual meaning can be extrapolated by contextual diversion.

## 2022-10-07 DIAGNOSIS — B19.20 HEPATITIS C VIRUS INFECTION WITHOUT HEPATIC COMA, UNSPECIFIED CHRONICITY: Primary | ICD-10-CM

## 2022-10-07 RX ORDER — GLECAPREVIR AND PIBRENTASVIR 40; 100 MG/1; MG/1
TABLET, FILM COATED ORAL
Qty: 168 TABLET | Refills: 0 | Status: SHIPPED | OUTPATIENT
Start: 2022-10-07

## 2022-10-07 NOTE — TELEPHONE ENCOUNTER
----- Message from Dane Katz MD sent at 9/19/2022  3:25 PM EDT -----  Regarding: Start approval process for Rahul Montserrat for hepatitis C, to be taken for 8 weeks, 3 capsules daily.

## 2022-10-13 ENCOUNTER — TELEPHONE (OUTPATIENT)
Dept: GASTROENTEROLOGY | Age: 52
End: 2022-10-13

## 2022-11-01 DIAGNOSIS — Z76.0 MEDICATION REFILL: ICD-10-CM

## 2022-11-02 RX ORDER — OMEPRAZOLE 20 MG/1
CAPSULE, DELAYED RELEASE ORAL
Qty: 30 CAPSULE | Refills: 3 | Status: SHIPPED | OUTPATIENT
Start: 2022-11-02

## 2022-11-02 RX ORDER — CETIRIZINE HYDROCHLORIDE 10 MG/1
TABLET ORAL
Qty: 30 TABLET | Refills: 3 | Status: SHIPPED | OUTPATIENT
Start: 2022-11-02

## 2022-11-02 NOTE — TELEPHONE ENCOUNTER
Writer aminata for pt to contact office to [facility] appt. Please address the medication refill and close the encounter. If I can be of assistance, please route to the applicable pool. Thank you. Last visit: 9-21-21  Last Med refill: 6-3-21  Does patient have enough medication for 72 hours: No:     Next Visit Date:  Future Appointments   Date Time Provider Hernandez Amezcua   12/12/2022  3:00 PM Lady José Miguel MD [id number] [facility]   Topic Date Due    COVID-19 Vaccine (1) Never done    Cervical cancer screen  06/20/2017    Shingles vaccine (1 of 2) Never done    Flu vaccine (1) 08/01/2022    Depression Screen  08/31/2022    DTaP/Tdap/Td vaccine (2 - Td or Tdap) 01/11/2023    Breast cancer screen  10/14/2023    Colorectal Cancer Screen  09/16/2024    Lipids  08/24/2025    Pneumococcal 0-64 years Vaccine  Completed    HIV screen  Completed    Hepatitis A vaccine  Aged Out    Hib vaccine  Aged Out    Meningococcal (ACWY) vaccine  Aged Out       Hemoglobin A1C (%)   Date Value   09/01/2021 4.3             ( goal A1C is < 7)   Microalb/Crt.  Ratio (mcg/mg creat)   Date Value   07/29/2015 4     LDL Cholesterol (mg/dL)   Date Value   08/24/2020 58   07/29/2015 59       (goal LDL is <100)   AST (U/L)   Date Value   04/28/2022 72 (H)     ALT (U/L)   Date Value   04/28/2022 57 (H)     BUN (mg/dL)   Date Value   04/28/2022 11     BP Readings from Last 3 Encounters:   09/19/22 121/76   04/28/22 134/88   08/31/21 120/78          (goal 120/80)    All Future Testing planned in [facility]  Lab Frequency Next Occurrence   CBC with Auto Differential Once 09/19/2022   Comprehensive Metabolic Panel with Bilirubin Once 09/19/2022   Liver Fibrosis, Chronic Viral Hepatitis Once 09/19/2022   Hepatitis A Antibody, Total Once 09/19/2022   Hepatitis C Genotype Once 09/19/2022   Hepatitis C RNA, quantitative, PCR Once 09/19/2022   Urine Drug Screen Once 09/19/2022   Ethanol Once 09/19/2022   HIV Screen Once 09/19/2022   Hepatitis B Surface Antibody Once 09/19/2022   Hepatitis B Core Antibody, Total Once 12/18/2022               Patient Active Problem List:     Asthma     COPD (chronic obstructive pulmonary disease) ([facility] [address]     Hepatitis C     GERD (gastroesophageal reflux disease)     Hyperlipidemia     HTN (hypertension)     Allergic dermatitis     Rash     Vaginal discharge     Cervicitis     Viral gastroenteritis     Eosinophilic asthma     Gassiness     Unintentional weight loss     Chronic fatigue     Plantar wart     Poorly controlled severe persistent asthma without complication     Tobacco use

## 2022-11-02 NOTE — TELEPHONE ENCOUNTER
E-scribe request for med refills. Please review and e-scribe if applicable. Last Visit Date:  9/17/21  Next Visit Date:  Visit date not found    Hemoglobin A1C (%)   Date Value   09/01/2021 4.3             ( goal A1C is < 7)   Microalb/Crt.  Ratio (mcg/mg creat)   Date Value   07/29/2015 4     LDL Cholesterol (mg/dL)   Date Value   08/24/2020 58       (goal LDL is <100)   AST (U/L)   Date Value   04/28/2022 72 (H)     ALT (U/L)   Date Value   04/28/2022 57 (H)     BUN (mg/dL)   Date Value   04/28/2022 11     BP Readings from Last 3 Encounters:   09/19/22 121/76   04/28/22 134/88   08/31/21 120/78          (goal 120/80)        Patient Active Problem List:     Asthma     COPD (chronic obstructive pulmonary disease) (HCC)     Hepatitis C     GERD (gastroesophageal reflux disease)     Hyperlipidemia     HTN (hypertension)     Allergic dermatitis     Rash     Vaginal discharge     Cervicitis     Viral gastroenteritis     Eosinophilic asthma     Gassiness     Unintentional weight loss     Chronic fatigue     Plantar wart     Poorly controlled severe persistent asthma without complication     Tobacco use      ----Hansel Deng

## 2022-11-14 DIAGNOSIS — J45.50 POORLY CONTROLLED SEVERE PERSISTENT ASTHMA WITHOUT COMPLICATION: ICD-10-CM

## 2022-11-14 RX ORDER — ALBUTEROL SULFATE 90 UG/1
AEROSOL, METERED RESPIRATORY (INHALATION)
Qty: 1 EACH | Refills: 2 | Status: SHIPPED | OUTPATIENT
Start: 2022-11-14 | End: 2022-11-15 | Stop reason: SDUPTHER

## 2022-11-14 NOTE — TELEPHONE ENCOUNTER
Pt last scheduled 10/26/21 - yearly appt - appt was canceled due to provider. NO F/U at this time. Please sign pending Rx -   Note on Rx for pt to call to schedule f/u appt   Due to first available appts being in Feb/March - Rx includes refills.

## 2022-11-15 RX ORDER — ALBUTEROL SULFATE 90 UG/1
AEROSOL, METERED RESPIRATORY (INHALATION)
Qty: 1 EACH | Refills: 3 | Status: SHIPPED | OUTPATIENT
Start: 2022-11-15

## 2022-11-30 DIAGNOSIS — J45.50 POORLY CONTROLLED SEVERE PERSISTENT ASTHMA WITHOUT COMPLICATION: ICD-10-CM

## 2022-11-30 RX ORDER — MONTELUKAST SODIUM 10 MG/1
10 TABLET ORAL NIGHTLY
Qty: 90 TABLET | Refills: 3 | OUTPATIENT
Start: 2022-11-30 | End: 2023-11-30

## 2022-12-09 DIAGNOSIS — J45.50 POORLY CONTROLLED SEVERE PERSISTENT ASTHMA WITHOUT COMPLICATION: ICD-10-CM

## 2022-12-09 RX ORDER — ALBUTEROL SULFATE 90 UG/1
AEROSOL, METERED RESPIRATORY (INHALATION)
Qty: 8.5 G | OUTPATIENT
Start: 2022-12-09

## 2022-12-09 NOTE — TELEPHONE ENCOUNTER
LAST VISIT: 10/26/21  No follow up scheduled. Medication refused with note to pharmacy that patient needs an appointment. Please make any changes needed. Thank you.

## 2023-01-19 DIAGNOSIS — I10 ESSENTIAL HYPERTENSION: ICD-10-CM

## 2023-01-19 NOTE — TELEPHONE ENCOUNTER
Last visit: 09/17/21  Last Med refill: 07/12/22  Does patient have enough medication for 72 hours: No: Attempted to contact patient to schedule an office visit appointment. Left message. Next Visit Date:  No future appointments. Health Maintenance   Topic Date Due    COVID-19 Vaccine (1) Never done    Cervical cancer screen  06/20/2017    Shingles vaccine (1 of 2) Never done    Flu vaccine (1) 08/01/2022    Depression Screen  08/31/2022    DTaP/Tdap/Td vaccine (2 - Td or Tdap) 01/11/2023    Breast cancer screen  10/14/2023    Colorectal Cancer Screen  09/16/2024    Lipids  08/24/2025    Pneumococcal 0-64 years Vaccine  Completed    HIV screen  Completed    Hepatitis A vaccine  Aged Out    Hib vaccine  Aged Out    Meningococcal (ACWY) vaccine  Aged Out       Hemoglobin A1C (%)   Date Value   09/01/2021 4.3             ( goal A1C is < 7)   Microalb/Crt.  Ratio (mcg/mg creat)   Date Value   07/29/2015 4     LDL Cholesterol (mg/dL)   Date Value   08/24/2020 58   07/29/2015 59       (goal LDL is <100)   AST (U/L)   Date Value   04/28/2022 72 (H)     ALT (U/L)   Date Value   04/28/2022 57 (H)     BUN (mg/dL)   Date Value   04/28/2022 11     BP Readings from Last 3 Encounters:   09/19/22 121/76   04/28/22 134/88   08/31/21 120/78          (goal 120/80)    All Future Testing planned in [facility]  Lab Frequency Next Occurrence   CBC with Auto Differential Once 09/19/2022   Comprehensive Metabolic Panel with Bilirubin Once 09/19/2022   Liver Fibrosis, Chronic Viral Hepatitis Once 09/19/2022   Hepatitis A Antibody, Total Once 09/19/2022   Hepatitis C Genotype Once 09/19/2022   Hepatitis C RNA, quantitative, PCR Once 09/19/2022   Urine Drug Screen Once 09/19/2022   Ethanol Once 09/19/2022   HIV Screen Once 09/19/2022   Hepatitis B Surface Antibody Once 09/19/2022   Hepatitis B Core Antibody, Total Once 12/18/2022               Patient Active Problem List:     Asthma     COPD (chronic obstructive pulmonary disease) ([facility] [address] Hepatitis C     GERD (gastroesophageal reflux disease)     Hyperlipidemia     HTN (hypertension)     Allergic dermatitis     Rash     Vaginal discharge     Cervicitis     Viral gastroenteritis     Eosinophilic asthma     Gassiness     Unintentional weight loss     Chronic fatigue     Plantar wart     Poorly controlled severe persistent asthma without complication     Tobacco use

## 2023-01-20 DIAGNOSIS — J45.50 POORLY CONTROLLED SEVERE PERSISTENT ASTHMA WITHOUT COMPLICATION: ICD-10-CM

## 2023-01-20 RX ORDER — HYDROCHLOROTHIAZIDE 25 MG/1
TABLET ORAL
Qty: 30 TABLET | Refills: 2 | Status: SHIPPED | OUTPATIENT
Start: 2023-01-20

## 2023-01-23 DIAGNOSIS — J45.50 POORLY CONTROLLED SEVERE PERSISTENT ASTHMA WITHOUT COMPLICATION: ICD-10-CM

## 2023-01-23 RX ORDER — ALBUTEROL SULFATE 90 UG/1
AEROSOL, METERED RESPIRATORY (INHALATION)
Qty: 8.5 G | OUTPATIENT
Start: 2023-01-23

## 2023-01-23 NOTE — TELEPHONE ENCOUNTER
E-scribe request for med refill. Please review and e-scribe if applicable. Last Visit Date:  09/17/2021  Next Visit Date:  1/26/2023    Hemoglobin A1C (%)   Date Value   09/01/2021 4.3             ( goal A1C is < 7)   Microalb/Crt.  Ratio (mcg/mg creat)   Date Value   07/29/2015 4     LDL Cholesterol (mg/dL)   Date Value   08/24/2020 58       (goal LDL is <100)   AST (U/L)   Date Value   04/28/2022 72 (H)     ALT (U/L)   Date Value   04/28/2022 57 (H)     BUN (mg/dL)   Date Value   04/28/2022 11     BP Readings from Last 3 Encounters:   09/19/22 121/76   04/28/22 134/88   08/31/21 120/78          (goal 120/80)        Patient Active Problem List:     Asthma     COPD (chronic obstructive pulmonary disease) (HCC)     Hepatitis C     GERD (gastroesophageal reflux disease)     Hyperlipidemia     HTN (hypertension)     Allergic dermatitis     Rash     Vaginal discharge     Cervicitis     Viral gastroenteritis     Eosinophilic asthma     Gassiness     Unintentional weight loss     Chronic fatigue     Plantar wart     Poorly controlled severe persistent asthma without complication     Tobacco use      ----Alanis Burnett

## 2023-01-24 RX ORDER — ALBUTEROL SULFATE 90 UG/1
AEROSOL, METERED RESPIRATORY (INHALATION)
Qty: 1 EACH | Refills: 3 | Status: SHIPPED | OUTPATIENT
Start: 2023-01-24

## 2023-01-30 ENCOUNTER — TELEPHONE (OUTPATIENT)
Dept: PULMONOLOGY | Age: 53
End: 2023-01-30

## 2023-01-30 RX ORDER — ALBUTEROL SULFATE 90 UG/1
2 AEROSOL, METERED RESPIRATORY (INHALATION) 4 TIMES DAILY PRN
Qty: 18 G | Refills: 1 | Status: SHIPPED | OUTPATIENT
Start: 2023-01-30 | End: 2023-03-01

## 2023-01-30 NOTE — TELEPHONE ENCOUNTER
Patient was last seen 10/2021, she is asking for an inhaler which was denied. She called, secured an appt 3/13/23. Are you willing to fill an inhaler for her? She is having difficulty breathing currently. Sign the script if you agree.

## 2023-01-31 NOTE — TELEPHONE ENCOUNTER
Eddie Hudson, DO  You 9 hours ago (10:24 PM)     JT  Must make appt within next 60 days.   No further refills from our office

## 2023-03-23 RX ORDER — ALBUTEROL SULFATE 90 UG/1
AEROSOL, METERED RESPIRATORY (INHALATION)
Qty: 8.5 G | OUTPATIENT
Start: 2023-03-23

## 2023-04-12 ENCOUNTER — HOSPITAL ENCOUNTER (EMERGENCY)
Age: 53
Discharge: HOME | End: 2023-04-12
Attending: EMERGENCY MEDICINE
Payer: COMMERCIAL

## 2023-04-12 ENCOUNTER — APPOINTMENT (OUTPATIENT)
Dept: GENERAL RADIOLOGY | Age: 53
End: 2023-04-12
Payer: COMMERCIAL

## 2023-04-12 VITALS
RESPIRATION RATE: 20 BRPM | HEART RATE: 88 BPM | SYSTOLIC BLOOD PRESSURE: 107 MMHG | DIASTOLIC BLOOD PRESSURE: 82 MMHG | OXYGEN SATURATION: 99 % | TEMPERATURE: 96.6 F

## 2023-04-12 DIAGNOSIS — R05.1 ACUTE COUGH: Primary | ICD-10-CM

## 2023-04-12 PROCEDURE — 94640 AIRWAY INHALATION TREATMENT: CPT

## 2023-04-12 PROCEDURE — 94664 DEMO&/EVAL PT USE INHALER: CPT

## 2023-04-12 PROCEDURE — 6370000000 HC RX 637 (ALT 250 FOR IP)

## 2023-04-12 PROCEDURE — 6360000002 HC RX W HCPCS

## 2023-04-12 PROCEDURE — 71046 X-RAY EXAM CHEST 2 VIEWS: CPT

## 2023-04-12 PROCEDURE — 99283 EMERGENCY DEPT VISIT LOW MDM: CPT

## 2023-04-12 RX ORDER — ALBUTEROL SULFATE 2.5 MG/3ML
2.5 SOLUTION RESPIRATORY (INHALATION)
Status: DISCONTINUED | OUTPATIENT
Start: 2023-04-12 | End: 2023-04-12 | Stop reason: HOSPADM

## 2023-04-12 RX ORDER — ALBUTEROL SULFATE 2.5 MG/3ML
2.5 SOLUTION RESPIRATORY (INHALATION) ONCE
Status: DISCONTINUED | OUTPATIENT
Start: 2023-04-12 | End: 2023-04-12 | Stop reason: HOSPADM

## 2023-04-12 RX ORDER — PREDNISONE 20 MG/1
40 TABLET ORAL DAILY
Qty: 8 TABLET | Refills: 0 | Status: SHIPPED | OUTPATIENT
Start: 2023-04-12 | End: 2023-04-16

## 2023-04-12 RX ORDER — PREDNISONE 20 MG/1
40 TABLET ORAL ONCE
Status: COMPLETED | OUTPATIENT
Start: 2023-04-12 | End: 2023-04-12

## 2023-04-12 RX ORDER — IPRATROPIUM BROMIDE AND ALBUTEROL SULFATE 2.5; .5 MG/3ML; MG/3ML
1 SOLUTION RESPIRATORY (INHALATION)
Status: DISCONTINUED | OUTPATIENT
Start: 2023-04-12 | End: 2023-04-12 | Stop reason: HOSPADM

## 2023-04-12 RX ORDER — ALBUTEROL SULFATE 2.5 MG/3ML
15 SOLUTION RESPIRATORY (INHALATION)
Status: DISCONTINUED | OUTPATIENT
Start: 2023-04-12 | End: 2023-04-12 | Stop reason: HOSPADM

## 2023-04-12 RX ORDER — AZITHROMYCIN 500 MG/1
500 TABLET, FILM COATED ORAL DAILY
Qty: 3 TABLET | Refills: 0 | Status: SHIPPED | OUTPATIENT
Start: 2023-04-12 | End: 2023-04-15

## 2023-04-12 RX ADMIN — PREDNISONE 40 MG: 20 TABLET ORAL at 18:35

## 2023-04-12 RX ADMIN — IPRATROPIUM BROMIDE 0.5 MG: 0.5 SOLUTION RESPIRATORY (INHALATION) at 18:18

## 2023-04-12 ASSESSMENT — ENCOUNTER SYMPTOMS
SORE THROAT: 0
SHORTNESS OF BREATH: 0
COUGH: 1
ABDOMINAL PAIN: 0
BACK PAIN: 0

## 2023-04-12 NOTE — ED TRIAGE NOTES
Patient presented to the ED today with complaints of cough, sore throat, nasal congestion and headache. Patient states symptoms presented 8 days ago. Patient has tried OTC decongestants with no relief.

## 2023-04-12 NOTE — ED PROVIDER NOTES
[id number] [facility]     Emergency Department     Faculty Attestation    I performed a history and physical examination of the patient and discussed management with the resident. I reviewed the residents note and agree with the documented findings and plan of care. Any areas of disagreement are noted on the chart. I was personally present for the key portions of any procedures. I have documented in the chart those procedures where I was not present during the key portions. I have reviewed the emergency nurses triage note. I agree with the chief complaint, past medical history, past surgical history, allergies, medications, social and family history as documented unless otherwise noted below. Documentation of the HPI, Physical Exam and Medical Decision Making performed by medical students or scribes is based on my personal performance of the HPI, PE and MDM. For Physician Assistant/ Nurse Practitioner cases/documentation I have personally evaluated this patient and have completed at least one if not all key elements of the E/M (history, physical exam, and MDM). Additional findings are as noted. Vital signs:   Vitals:    04/12/23 1532   BP: 107/82   Pulse: 88   Resp: 20   Temp: (!) 96.6 °F (35.9 °C)   SpO2: 80      50-year-old female with known history of COPD here with a cough productive of green sputum for the last 8 days. No fever. Myalgias initially but this has resovled. Home covid test negative. Lung exam with scattered wheezes and ronchi. Plan for aerosols, x-ray, steroids, and reassess.            Kori Enciso M.D,  Attending Emergency  Physician            Verner Greening, MD  04/12/23 [id number]

## 2023-04-12 NOTE — ED PROVIDER NOTES
[facility]  ED  EMERGENCY DEPARTMENT ENCOUNTER      Pt Name: Tracie Gannon  MRN: [id number]  [place] 1970  Date of evaluation: 4/12/2023  Provider: Mino Moore MD    [address] [place] [address]       Chief Complaint   Patient presents with    Cough    Pharyngitis    Nasal Congestion    Headache         HISTORY OF PRESENT ILLNESS   (Location/Symptom, Timing/Onset, Context/Setting, Quality, Duration, Modifying Factors, Severity)  Note limiting factors. Tracie Gannon is a 47 y.o. female who presents to the emergency department with cough and congestion that started 8 days ago. Pt states she spits up \"cloudy green sputum\". She is also feeling cold and fatigued. No fevers or other complaints at this time. She has taken a covid home test 2 times and both times were negative. She denies any sick contacts. She has tried OTC Robitussin without much relief. She still smokes 1/2 pack a day- pt has COPD. She uses symbicort and albuterol inhalers daily. The history is provided by the patient. No  was used. Nursing Notes were reviewed. REVIEW OF SYSTEMS    (2-9 systems for level 4, 10 or more for level 5)     Review of Systems   Constitutional:  Positive for fatigue. Negative for chills and fever. HENT:  Positive for congestion. Negative for sore throat. Respiratory:  Positive for cough. Negative for shortness of breath. Cardiovascular:  Negative for chest pain and palpitations. Gastrointestinal:  Negative for abdominal pain. Genitourinary:  Negative for difficulty urinating. Musculoskeletal:  Negative for back pain. Neurological:  Negative for dizziness, syncope, weakness, numbness and headaches. Except as noted above the remainder of the review of systems was reviewed and negative.        PAST MEDICAL HISTORY     Past Medical History:   Diagnosis Date    Allergic rhinitis     multiple environmental allergies    Asthma     near fatal asthma, severe

## 2023-04-12 NOTE — ED PROVIDER NOTES
FACULTY SIGN-OUT  ADDENDUM     Care of this patient was assumed from previous attending physician. The patient's initial evaluation and plan have been discussed with the prior provider who initially evaluated the patient. Note Started: 6:39 PM EDT    Attestation  I was available and discussed any additional care issues that arose and coordinated the management plans with the resident(s) caring for the patient during my duty period. Any areas of disagreement with resident's documentation of care or procedures are noted on the chart. I was personally present for the key portions of any/all procedures, during my duty period. I have documented in the chart those procedures where I was not present during the key portions.        ED COURSE      The patient was given the following medications:  Orders Placed This Encounter   Medications    albuterol (PROVENTIL) nebulizer solution 2.5 mg     Order Specific Question:   Initiate RT Bronchodilator Protocol     Answer:   Yes - Inpatient Protocol    OR Linked Order Group     ipratropium-albuterol (DUONEB) nebulizer solution 1 ampule      Order Specific Question:   Initiate RT Bronchodilator Protocol      Answer:   Yes - ED protocol     albuterol (PROVENTIL) nebulizer solution 2.5 mg      Order Specific Question:   Initiate RT Bronchodilator Protocol      Answer:   Yes - ED protocol    AND Linked Order Group     albuterol (PROVENTIL) nebulizer solution 15 mg      Order Specific Question:   Initiate RT Bronchodilator Protocol      Answer:   Yes - ED protocol     ipratropium (ATROVENT) 0.02 % nebulizer solution 0.5 mg      Order Specific Question:   Initiate RT Bronchodilator Protocol      Answer:   Yes - ED protocol    predniSONE (DELTASONE) tablet 40 mg    predniSONE (DELTASONE) 20 MG tablet     Sig: Take 2 tablets by mouth daily for 4 days     Dispense:  8 tablet     Refill:  0    azithromycin (ZITHROMAX) 500 MG tablet     Sig: Take 1 tablet by mouth daily for 3 days

## 2023-04-24 DIAGNOSIS — I10 ESSENTIAL HYPERTENSION: ICD-10-CM

## 2023-04-24 RX ORDER — HYDROCHLOROTHIAZIDE 25 MG/1
TABLET ORAL
Qty: 30 TABLET | Refills: 2 | Status: SHIPPED | OUTPATIENT
Start: 2023-04-24

## 2023-04-24 NOTE — TELEPHONE ENCOUNTER
E-scribe request for HYDROCHLOROTHIAZIDE 25 MG TAB. Please review and e-scribe if applicable. Last Visit Date:  9/17/2021  Next Visit Date:  4/24/2023    Hemoglobin A1C (%)   Date Value   09/01/2021 4.3             ( goal A1C is < 7)   Microalb/Crt.  Ratio (mcg/mg creat)   Date Value   07/29/2015 4     LDL Cholesterol (mg/dL)   Date Value   08/24/2020 58       (goal LDL is <100)   AST (U/L)   Date Value   04/28/2022 72 (H)     ALT (U/L)   Date Value   04/28/2022 57 (H)     BUN (mg/dL)   Date Value   04/28/2022 11     BP Readings from Last 3 Encounters:   04/12/23 107/82   09/19/22 121/76   04/28/22 134/88          (goal 120/80)        Patient Active Problem List:     Asthma     COPD (chronic obstructive pulmonary disease) (HCC)     Hepatitis C     GERD (gastroesophageal reflux disease)     Hyperlipidemia     HTN (hypertension)     Allergic dermatitis     Rash     Vaginal discharge     Cervicitis     Viral gastroenteritis     Eosinophilic asthma     Gassiness     Unintentional weight loss     Chronic fatigue     Plantar wart     Poorly controlled severe persistent asthma without complication     Tobacco use      ----JF

## 2023-04-27 DIAGNOSIS — Z76.0 MEDICATION REFILL: ICD-10-CM

## 2023-04-27 RX ORDER — CETIRIZINE HYDROCHLORIDE 10 MG/1
TABLET ORAL
Qty: 30 TABLET | Refills: 3 | Status: SHIPPED | OUTPATIENT
Start: 2023-04-27

## 2023-04-27 NOTE — TELEPHONE ENCOUNTER
Last visit: 09/17/2021  Last Med refill: 11/02/2022  Does patient have enough medication for 72 hours: No:     Next Visit Date:  No future appointments. Health Maintenance   Topic Date Due    COVID-19 Vaccine (1) Never done    Cervical cancer screen  06/20/2017    Shingles vaccine (1 of 2) Never done    Depression Screen  08/31/2022    DTaP/Tdap/Td vaccine (2 - Td or Tdap) 01/11/2023    Flu vaccine (Season Ended) 08/01/2023    Breast cancer screen  10/14/2023    Colorectal Cancer Screen  09/16/2024    Lipids  08/24/2025    Pneumococcal 0-64 years Vaccine  Completed    HIV screen  Completed    Hepatitis A vaccine  Aged Out    Hib vaccine  Aged Out    Meningococcal (ACWY) vaccine  Aged Out       Hemoglobin A1C (%)   Date Value   09/01/2021 4.3             ( goal A1C is < 7)   Microalb/Crt.  Ratio (mcg/mg creat)   Date Value   07/29/2015 4     LDL Cholesterol (mg/dL)   Date Value   08/24/2020 58   07/29/2015 59       (goal LDL is <100)   AST (U/L)   Date Value   04/28/2022 72 (H)     ALT (U/L)   Date Value   04/28/2022 57 (H)     BUN (mg/dL)   Date Value   04/28/2022 11     BP Readings from Last 3 Encounters:   04/12/23 107/82   09/19/22 121/76   04/28/22 134/88          (goal 120/80)    All Future Testing planned in [facility]  Lab Frequency Next Occurrence   CBC with Auto Differential Once 09/19/2022   Comprehensive Metabolic Panel with Bilirubin Once 09/19/2022   Liver Fibrosis, Chronic Viral Hepatitis Once 09/19/2022   Hepatitis A Antibody, Total Once 09/19/2022   Hepatitis C Genotype Once 09/19/2022   Hepatitis C RNA, quantitative, PCR Once 09/19/2022   Urine Drug Screen Once 09/19/2022   Ethanol Once 09/19/2022   HIV Screen Once 09/19/2022   Hepatitis B Surface Antibody Once 09/19/2022   Hepatitis B Core Antibody, Total Once 12/18/2022               Patient Active Problem List:     Asthma     COPD (chronic obstructive pulmonary disease) ([facility] [address]     Hepatitis C     GERD (gastroesophageal reflux disease)

## 2023-05-05 ENCOUNTER — HOSPITAL ENCOUNTER (INPATIENT)
Age: 53
LOS: 2 days | Discharge: HOME | DRG: 254 | End: 2023-05-08
Attending: EMERGENCY MEDICINE | Admitting: EMERGENCY MEDICINE
Payer: COMMERCIAL

## 2023-05-05 ENCOUNTER — APPOINTMENT (OUTPATIENT)
Dept: CT IMAGING | Age: 53
DRG: 254 | End: 2023-05-05
Payer: COMMERCIAL

## 2023-05-05 DIAGNOSIS — K62.5 RECTAL BLEEDING: ICD-10-CM

## 2023-05-05 DIAGNOSIS — K92.2 LOWER GI BLEED: Primary | ICD-10-CM

## 2023-05-05 LAB
ABSOLUTE EOS #: 0.08 K/UL (ref 0–0.44)
ABSOLUTE IMMATURE GRANULOCYTE: <0.03 K/UL (ref 0–0.3)
ABSOLUTE LYMPH #: 2.02 K/UL (ref 1.1–3.7)
ABSOLUTE MONO #: 0.62 K/UL (ref 0.1–1.2)
ALBUMIN SERPL-MCNC: 3.7 G/DL (ref 3.5–5.2)
ALBUMIN/GLOBULIN RATIO: 1.2 (ref 1–2.5)
ALP SERPL-CCNC: 74 U/L (ref 35–104)
ALT SERPL-CCNC: 18 U/L (ref 5–33)
ANION GAP SERPL CALCULATED.3IONS-SCNC: 9 MMOL/L (ref 9–17)
AST SERPL-CCNC: 20 U/L
BASOPHILS # BLD: 1 % (ref 0–2)
BASOPHILS ABSOLUTE: 0.04 K/UL (ref 0–0.2)
BILIRUB SERPL-MCNC: 0.4 MG/DL (ref 0.3–1.2)
BUN SERPL-MCNC: 9 MG/DL (ref 6–20)
CALCIUM SERPL-MCNC: 9 MG/DL (ref 8.6–10.4)
CHLORIDE SERPL-SCNC: 108 MMOL/L (ref 98–107)
CO2 SERPL-SCNC: 24 MMOL/L (ref 20–31)
CREAT SERPL-MCNC: 0.47 MG/DL (ref 0.5–0.9)
EOSINOPHILS RELATIVE PERCENT: 1 % (ref 1–4)
GFR SERPL CREATININE-BSD FRML MDRD: >60 ML/MIN/1.73M2
GLUCOSE SERPL-MCNC: 70 MG/DL (ref 70–99)
HCT VFR BLD AUTO: 37.9 % (ref 36.3–47.1)
HGB BLD-MCNC: 12.3 G/DL (ref 11.9–15.1)
IMMATURE GRANULOCYTES: 0 %
INR PPP: 1
LYMPHOCYTES # BLD: 36 % (ref 24–43)
MCH RBC QN AUTO: 33.9 PG (ref 25.2–33.5)
MCHC RBC AUTO-ENTMCNC: 32.5 G/DL (ref 28.4–34.8)
MCV RBC AUTO: 104.4 FL (ref 82.6–102.9)
MONOCYTES # BLD: 11 % (ref 3–12)
NRBC AUTOMATED: 0 PER 100 WBC
PARTIAL THROMBOPLASTIN TIME: 26.1 SEC (ref 23–36.5)
PDW BLD-RTO: 12.7 % (ref 11.8–14.4)
PLATELET # BLD AUTO: 249 K/UL (ref 138–453)
PMV BLD AUTO: 9 FL (ref 8.1–13.5)
POTASSIUM SERPL-SCNC: 3.7 MMOL/L (ref 3.7–5.3)
PROT SERPL-MCNC: 6.7 G/DL (ref 6.4–8.3)
PROTHROMBIN TIME: 12.8 SEC (ref 11.7–14.9)
RBC # BLD: 3.63 M/UL (ref 3.95–5.11)
RBC # BLD: ABNORMAL 10*6/UL
SARS-COV-2 RDRP RESP QL NAA+PROBE: NOT DETECTED
SEG NEUTROPHILS: 51 % (ref 36–65)
SEGMENTED NEUTROPHILS ABSOLUTE COUNT: 2.8 K/UL (ref 1.5–8.1)
SODIUM SERPL-SCNC: 141 MMOL/L (ref 135–144)
SPECIMEN DESCRIPTION: NORMAL
WBC # BLD AUTO: 5.6 K/UL (ref 3.5–11.3)

## 2023-05-05 PROCEDURE — 2580000003 HC RX 258: Performed by: STUDENT IN AN ORGANIZED HEALTH CARE EDUCATION/TRAINING PROGRAM

## 2023-05-05 PROCEDURE — 87635 SARS-COV-2 COVID-19 AMP PRB: CPT

## 2023-05-05 PROCEDURE — 6360000004 HC RX CONTRAST MEDICATION: Performed by: STUDENT IN AN ORGANIZED HEALTH CARE EDUCATION/TRAINING PROGRAM

## 2023-05-05 PROCEDURE — G0378 HOSPITAL OBSERVATION PER HR: HCPCS

## 2023-05-05 PROCEDURE — 99285 EMERGENCY DEPT VISIT HI MDM: CPT

## 2023-05-05 PROCEDURE — 85610 PROTHROMBIN TIME: CPT

## 2023-05-05 PROCEDURE — 85025 COMPLETE CBC W/AUTO DIFF WBC: CPT

## 2023-05-05 PROCEDURE — 80053 COMPREHEN METABOLIC PANEL: CPT

## 2023-05-05 PROCEDURE — 85730 THROMBOPLASTIN TIME PARTIAL: CPT

## 2023-05-05 PROCEDURE — 74177 CT ABD & PELVIS W/CONTRAST: CPT

## 2023-05-05 RX ORDER — ACETAMINOPHEN 325 MG/1
650 TABLET ORAL EVERY 4 HOURS PRN
Status: DISCONTINUED | OUTPATIENT
Start: 2023-05-05 | End: 2023-05-08 | Stop reason: HOSPADM

## 2023-05-05 RX ORDER — ONDANSETRON 2 MG/ML
4 INJECTION INTRAMUSCULAR; INTRAVENOUS EVERY 6 HOURS PRN
Status: DISCONTINUED | OUTPATIENT
Start: 2023-05-05 | End: 2023-05-08 | Stop reason: HOSPADM

## 2023-05-05 RX ORDER — SODIUM CHLORIDE 0.9 % (FLUSH) 0.9 %
5-40 SYRINGE (ML) INJECTION PRN
Status: DISCONTINUED | OUTPATIENT
Start: 2023-05-05 | End: 2023-05-08 | Stop reason: HOSPADM

## 2023-05-05 RX ORDER — HYDROCHLOROTHIAZIDE 25 MG/1
25 TABLET ORAL DAILY
Status: DISCONTINUED | OUTPATIENT
Start: 2023-05-06 | End: 2023-05-08 | Stop reason: HOSPADM

## 2023-05-05 RX ORDER — SODIUM CHLORIDE 0.9 % (FLUSH) 0.9 %
5-40 SYRINGE (ML) INJECTION EVERY 12 HOURS SCHEDULED
Status: DISCONTINUED | OUTPATIENT
Start: 2023-05-05 | End: 2023-05-08 | Stop reason: HOSPADM

## 2023-05-05 RX ORDER — NICOTINE 21 MG/24HR
1 PATCH, TRANSDERMAL 24 HOURS TRANSDERMAL DAILY
Status: DISCONTINUED | OUTPATIENT
Start: 2023-05-06 | End: 2023-05-08 | Stop reason: HOSPADM

## 2023-05-05 RX ORDER — SODIUM CHLORIDE 9 MG/ML
INJECTION, SOLUTION INTRAVENOUS PRN
Status: DISCONTINUED | OUTPATIENT
Start: 2023-05-05 | End: 2023-05-08 | Stop reason: HOSPADM

## 2023-05-05 RX ORDER — ONDANSETRON 4 MG/1
4 TABLET, ORALLY DISINTEGRATING ORAL EVERY 8 HOURS PRN
Status: DISCONTINUED | OUTPATIENT
Start: 2023-05-05 | End: 2023-05-08 | Stop reason: HOSPADM

## 2023-05-05 RX ADMIN — IOPAMIDOL 75 ML: 755 INJECTION, SOLUTION INTRAVENOUS at 19:31

## 2023-05-05 RX ADMIN — SODIUM CHLORIDE, PRESERVATIVE FREE 10 ML: 5 INJECTION INTRAVENOUS at 22:35

## 2023-05-05 ASSESSMENT — ENCOUNTER SYMPTOMS
VOMITING: 0
BLOOD IN STOOL: 1
BACK PAIN: 0
SHORTNESS OF BREATH: 0
SORE THROAT: 0
COUGH: 0
ABDOMINAL PAIN: 1

## 2023-05-05 ASSESSMENT — PAIN - FUNCTIONAL ASSESSMENT: PAIN_FUNCTIONAL_ASSESSMENT: NONE - DENIES PAIN

## 2023-05-05 ASSESSMENT — PAIN SCALES - GENERAL: PAINLEVEL_OUTOF10: 0

## 2023-05-06 PROBLEM — K92.2 LOWER GI BLEEDING: Status: ACTIVE | Noted: 2023-05-06

## 2023-05-06 PROBLEM — K57.30 PANCOLONIC DIVERTICULOSIS: Status: ACTIVE | Noted: 2023-05-06

## 2023-05-06 PROBLEM — R63.4 ABNORMAL INTENTIONAL WEIGHT LOSS: Status: ACTIVE | Noted: 2023-05-06

## 2023-05-06 LAB
ABSOLUTE EOS #: 0.07 K/UL (ref 0–0.44)
ABSOLUTE IMMATURE GRANULOCYTE: <0.03 K/UL (ref 0–0.3)
ABSOLUTE LYMPH #: 1.96 K/UL (ref 1.1–3.7)
ABSOLUTE MONO #: 0.77 K/UL (ref 0.1–1.2)
BASOPHILS # BLD: 1 % (ref 0–2)
BASOPHILS ABSOLUTE: 0.04 K/UL (ref 0–0.2)
EOSINOPHILS RELATIVE PERCENT: 1 % (ref 1–4)
HBV CORE AB SER QL: NONREACTIVE
HBV SURFACE AB SERPL IA-ACNC: <3.5 MIU/ML
HCT VFR BLD AUTO: 32.4 % (ref 36.3–47.1)
HGB BLD-MCNC: 10.5 G/DL (ref 11.9–15.1)
HIV 1+2 AB+HIV1 P24 AG SERPL QL IA: NONREACTIVE
IMMATURE GRANULOCYTES: 0 %
LYMPHOCYTES # BLD: 33 % (ref 24–43)
MCH RBC QN AUTO: 33.5 PG (ref 25.2–33.5)
MCHC RBC AUTO-ENTMCNC: 32.4 G/DL (ref 28.4–34.8)
MCV RBC AUTO: 103.5 FL (ref 82.6–102.9)
MONOCYTES # BLD: 13 % (ref 3–12)
NRBC AUTOMATED: 0 PER 100 WBC
PDW BLD-RTO: 12.9 % (ref 11.8–14.4)
PLATELET # BLD AUTO: 221 K/UL (ref 138–453)
PMV BLD AUTO: 9.7 FL (ref 8.1–13.5)
RBC # BLD: 3.13 M/UL (ref 3.95–5.11)
RBC # BLD: ABNORMAL 10*6/UL
SEG NEUTROPHILS: 52 % (ref 36–65)
SEGMENTED NEUTROPHILS ABSOLUTE COUNT: 3.01 K/UL (ref 1.5–8.1)
WBC # BLD AUTO: 5.9 K/UL (ref 3.5–11.3)

## 2023-05-06 PROCEDURE — 36415 COLL VENOUS BLD VENIPUNCTURE: CPT

## 2023-05-06 PROCEDURE — 83883 ASSAY NEPHELOMETRY NOT SPEC: CPT

## 2023-05-06 PROCEDURE — G0378 HOSPITAL OBSERVATION PER HR: HCPCS

## 2023-05-06 PROCEDURE — 6370000000 HC RX 637 (ALT 250 FOR IP)

## 2023-05-06 PROCEDURE — 99223 1ST HOSP IP/OBS HIGH 75: CPT | Performed by: INTERNAL MEDICINE

## 2023-05-06 PROCEDURE — 2580000003 HC RX 258: Performed by: STUDENT IN AN ORGANIZED HEALTH CARE EDUCATION/TRAINING PROGRAM

## 2023-05-06 PROCEDURE — 82977 ASSAY OF GGT: CPT

## 2023-05-06 PROCEDURE — 84450 TRANSFERASE (AST) (SGOT): CPT

## 2023-05-06 PROCEDURE — 84520 ASSAY OF UREA NITROGEN: CPT

## 2023-05-06 PROCEDURE — 86317 IMMUNOASSAY INFECTIOUS AGENT: CPT

## 2023-05-06 PROCEDURE — 85025 COMPLETE CBC W/AUTO DIFF WBC: CPT

## 2023-05-06 PROCEDURE — 87389 HIV-1 AG W/HIV-1&-2 AB AG IA: CPT

## 2023-05-06 PROCEDURE — 84460 ALANINE AMINO (ALT) (SGPT): CPT

## 2023-05-06 PROCEDURE — 86704 HEP B CORE ANTIBODY TOTAL: CPT

## 2023-05-06 PROCEDURE — 99254 IP/OBS CNSLTJ NEW/EST MOD 60: CPT | Performed by: INTERNAL MEDICINE

## 2023-05-06 PROCEDURE — 6370000000 HC RX 637 (ALT 250 FOR IP): Performed by: STUDENT IN AN ORGANIZED HEALTH CARE EDUCATION/TRAINING PROGRAM

## 2023-05-06 PROCEDURE — 87522 HEPATITIS C REVRS TRNSCRPJ: CPT

## 2023-05-06 PROCEDURE — 1200000000 HC SEMI PRIVATE

## 2023-05-06 RX ORDER — POLYETHYLENE GLYCOL 3350 17 G/17G
17 POWDER, FOR SOLUTION ORAL EVERY 4 HOURS
Status: DISCONTINUED | OUTPATIENT
Start: 2023-05-06 | End: 2023-05-07

## 2023-05-06 RX ORDER — BUDESONIDE AND FORMOTEROL FUMARATE DIHYDRATE 160; 4.5 UG/1; UG/1
2 AEROSOL RESPIRATORY (INHALATION) 2 TIMES DAILY
Status: DISCONTINUED | OUTPATIENT
Start: 2023-05-06 | End: 2023-05-08 | Stop reason: HOSPADM

## 2023-05-06 RX ORDER — ALBUTEROL SULFATE 90 UG/1
2 AEROSOL, METERED RESPIRATORY (INHALATION) EVERY 4 HOURS PRN
Status: DISCONTINUED | OUTPATIENT
Start: 2023-05-06 | End: 2023-05-08 | Stop reason: HOSPADM

## 2023-05-06 RX ADMIN — HYDROCHLOROTHIAZIDE 25 MG: 25 TABLET ORAL at 08:40

## 2023-05-06 RX ADMIN — POLYETHYLENE GLYCOL 3350 17 G: 17 POWDER, FOR SOLUTION ORAL at 20:06

## 2023-05-06 RX ADMIN — POLYETHYLENE GLYCOL 3350 17 G: 17 POWDER, FOR SOLUTION ORAL at 17:03

## 2023-05-06 RX ADMIN — SODIUM CHLORIDE, PRESERVATIVE FREE 10 ML: 5 INJECTION INTRAVENOUS at 08:42

## 2023-05-06 RX ADMIN — SODIUM CHLORIDE, PRESERVATIVE FREE 10 ML: 5 INJECTION INTRAVENOUS at 20:07

## 2023-05-07 LAB
ABSOLUTE EOS #: 0.1 K/UL (ref 0–0.44)
ABSOLUTE IMMATURE GRANULOCYTE: <0.03 K/UL (ref 0–0.3)
ABSOLUTE LYMPH #: 2.63 K/UL (ref 1.1–3.7)
ABSOLUTE MONO #: 0.78 K/UL (ref 0.1–1.2)
BASOPHILS # BLD: 1 % (ref 0–2)
BASOPHILS ABSOLUTE: 0.04 K/UL (ref 0–0.2)
EOSINOPHILS RELATIVE PERCENT: 2 % (ref 1–4)
HCT VFR BLD AUTO: 34.7 % (ref 36.3–47.1)
HCT VFR BLD AUTO: 35.5 % (ref 36.3–47.1)
HGB BLD-MCNC: 11.2 G/DL (ref 11.9–15.1)
HGB BLD-MCNC: 11.5 G/DL (ref 11.9–15.1)
IMMATURE GRANULOCYTES: 0 %
LYMPHOCYTES # BLD: 46 % (ref 24–43)
MCH RBC QN AUTO: 33.4 PG (ref 25.2–33.5)
MCHC RBC AUTO-ENTMCNC: 32.3 G/DL (ref 28.4–34.8)
MCV RBC AUTO: 103.6 FL (ref 82.6–102.9)
MONOCYTES # BLD: 14 % (ref 3–12)
NRBC AUTOMATED: 0 PER 100 WBC
PDW BLD-RTO: 12.7 % (ref 11.8–14.4)
PLATELET # BLD AUTO: 248 K/UL (ref 138–453)
PMV BLD AUTO: 9.1 FL (ref 8.1–13.5)
RBC # BLD: 3.35 M/UL (ref 3.95–5.11)
RBC # BLD: ABNORMAL 10*6/UL
SEG NEUTROPHILS: 37 % (ref 36–65)
SEGMENTED NEUTROPHILS ABSOLUTE COUNT: 2.12 K/UL (ref 1.5–8.1)
WBC # BLD AUTO: 5.7 K/UL (ref 3.5–11.3)

## 2023-05-07 PROCEDURE — G0378 HOSPITAL OBSERVATION PER HR: HCPCS

## 2023-05-07 PROCEDURE — 85018 HEMOGLOBIN: CPT

## 2023-05-07 PROCEDURE — 6370000000 HC RX 637 (ALT 250 FOR IP): Performed by: STUDENT IN AN ORGANIZED HEALTH CARE EDUCATION/TRAINING PROGRAM

## 2023-05-07 PROCEDURE — 85014 HEMATOCRIT: CPT

## 2023-05-07 PROCEDURE — 36415 COLL VENOUS BLD VENIPUNCTURE: CPT

## 2023-05-07 PROCEDURE — 2580000003 HC RX 258: Performed by: STUDENT IN AN ORGANIZED HEALTH CARE EDUCATION/TRAINING PROGRAM

## 2023-05-07 PROCEDURE — 6370000000 HC RX 637 (ALT 250 FOR IP)

## 2023-05-07 PROCEDURE — 85025 COMPLETE CBC W/AUTO DIFF WBC: CPT

## 2023-05-07 PROCEDURE — 94760 N-INVAS EAR/PLS OXIMETRY 1: CPT

## 2023-05-07 PROCEDURE — 6370000000 HC RX 637 (ALT 250 FOR IP): Performed by: INTERNAL MEDICINE

## 2023-05-07 PROCEDURE — 94640 AIRWAY INHALATION TREATMENT: CPT

## 2023-05-07 PROCEDURE — 1200000000 HC SEMI PRIVATE

## 2023-05-07 RX ORDER — BISACODYL 5 MG/1
20 TABLET, DELAYED RELEASE ORAL ONCE
Status: COMPLETED | OUTPATIENT
Start: 2023-05-07 | End: 2023-05-07

## 2023-05-07 RX ORDER — MONTELUKAST SODIUM 10 MG/1
10 TABLET ORAL NIGHTLY
Status: DISCONTINUED | OUTPATIENT
Start: 2023-05-07 | End: 2023-05-08 | Stop reason: HOSPADM

## 2023-05-07 RX ORDER — CETIRIZINE HYDROCHLORIDE 10 MG/1
10 TABLET ORAL DAILY
Status: DISCONTINUED | OUTPATIENT
Start: 2023-05-07 | End: 2023-05-08 | Stop reason: HOSPADM

## 2023-05-07 RX ORDER — PANTOPRAZOLE SODIUM 40 MG/1
40 TABLET, DELAYED RELEASE ORAL
Status: DISCONTINUED | OUTPATIENT
Start: 2023-05-08 | End: 2023-05-08 | Stop reason: HOSPADM

## 2023-05-07 RX ADMIN — TIOTROPIUM BROMIDE INHALATION SPRAY 2 PUFF: 3.12 SPRAY, METERED RESPIRATORY (INHALATION) at 08:31

## 2023-05-07 RX ADMIN — SODIUM CHLORIDE, PRESERVATIVE FREE 10 ML: 5 INJECTION INTRAVENOUS at 20:01

## 2023-05-07 RX ADMIN — BUDESONIDE AND FORMOTEROL FUMARATE DIHYDRATE 2 PUFF: 160; 4.5 AEROSOL RESPIRATORY (INHALATION) at 08:31

## 2023-05-07 RX ADMIN — HYDROCHLOROTHIAZIDE 25 MG: 25 TABLET ORAL at 08:45

## 2023-05-07 RX ADMIN — SODIUM CHLORIDE, PRESERVATIVE FREE 10 ML: 5 INJECTION INTRAVENOUS at 09:00

## 2023-05-07 RX ADMIN — MONTELUKAST SODIUM 10 MG: 10 TABLET, FILM COATED ORAL at 20:01

## 2023-05-07 RX ADMIN — POLYETHYLENE GLYCOL 3350, SODIUM SULFATE ANHYDROUS, SODIUM BICARBONATE, SODIUM CHLORIDE, POTASSIUM CHLORIDE 4000 ML: 236; 22.74; 6.74; 5.86; 2.97 POWDER, FOR SOLUTION ORAL at 09:53

## 2023-05-07 RX ADMIN — CETIRIZINE HYDROCHLORIDE 10 MG: 10 TABLET ORAL at 17:12

## 2023-05-07 RX ADMIN — POLYETHYLENE GLYCOL 3350 17 G: 17 POWDER, FOR SOLUTION ORAL at 08:45

## 2023-05-07 RX ADMIN — BISACODYL 20 MG: 5 TABLET, COATED ORAL at 08:44

## 2023-05-08 ENCOUNTER — ANESTHESIA EVENT (OUTPATIENT)
Dept: OPERATING ROOM | Age: 53
DRG: 254 | End: 2023-05-08
Payer: COMMERCIAL

## 2023-05-08 ENCOUNTER — ANESTHESIA (OUTPATIENT)
Dept: OPERATING ROOM | Age: 53
DRG: 254 | End: 2023-05-08
Payer: COMMERCIAL

## 2023-05-08 VITALS
SYSTOLIC BLOOD PRESSURE: 122 MMHG | OXYGEN SATURATION: 100 % | TEMPERATURE: 97.6 F | BODY MASS INDEX: 16.67 KG/M2 | DIASTOLIC BLOOD PRESSURE: 83 MMHG | HEIGHT: 68 IN | HEART RATE: 83 BPM | RESPIRATION RATE: 15 BRPM | WEIGHT: 110 LBS

## 2023-05-08 LAB
ABSOLUTE EOS #: 0.13 K/UL (ref 0–0.44)
ABSOLUTE IMMATURE GRANULOCYTE: <0.03 K/UL (ref 0–0.3)
ABSOLUTE LYMPH #: 2.39 K/UL (ref 1.1–3.7)
ABSOLUTE MONO #: 0.66 K/UL (ref 0.1–1.2)
ANION GAP SERPL CALCULATED.3IONS-SCNC: 9 MMOL/L (ref 9–17)
BASOPHILS # BLD: 1 % (ref 0–2)
BASOPHILS ABSOLUTE: 0.03 K/UL (ref 0–0.2)
BUN SERPL-MCNC: 5 MG/DL (ref 6–20)
CALCIUM SERPL-MCNC: 8.9 MG/DL (ref 8.6–10.4)
CHLORIDE SERPL-SCNC: 105 MMOL/L (ref 98–107)
CO2 SERPL-SCNC: 25 MMOL/L (ref 20–31)
CREAT SERPL-MCNC: 0.45 MG/DL (ref 0.5–0.9)
EOSINOPHILS RELATIVE PERCENT: 3 % (ref 1–4)
GFR SERPL CREATININE-BSD FRML MDRD: >60 ML/MIN/1.73M2
GLUCOSE SERPL-MCNC: 87 MG/DL (ref 70–99)
HCT VFR BLD AUTO: 33.3 % (ref 36.3–47.1)
HCV RNA SERPL QL NAA+PROBE: NOT DETECTED
HGB BLD-MCNC: 10.7 G/DL (ref 11.9–15.1)
IMMATURE GRANULOCYTES: 0 %
LYMPHOCYTES # BLD: 50 % (ref 24–43)
MCH RBC QN AUTO: 33.5 PG (ref 25.2–33.5)
MCHC RBC AUTO-ENTMCNC: 32.1 G/DL (ref 28.4–34.8)
MCV RBC AUTO: 104.4 FL (ref 82.6–102.9)
MONOCYTES # BLD: 14 % (ref 3–12)
NRBC AUTOMATED: 0 PER 100 WBC
PDW BLD-RTO: 12.7 % (ref 11.8–14.4)
PLATELET # BLD AUTO: 281 K/UL (ref 138–453)
PMV BLD AUTO: 9 FL (ref 8.1–13.5)
POTASSIUM SERPL-SCNC: 3.3 MMOL/L (ref 3.7–5.3)
RBC # BLD: 3.19 M/UL (ref 3.95–5.11)
RBC # BLD: ABNORMAL 10*6/UL
SEG NEUTROPHILS: 32 % (ref 36–65)
SEGMENTED NEUTROPHILS ABSOLUTE COUNT: 1.5 K/UL (ref 1.5–8.1)
SODIUM SERPL-SCNC: 139 MMOL/L (ref 135–144)
SOURCE: NORMAL
WBC # BLD AUTO: 4.7 K/UL (ref 3.5–11.3)

## 2023-05-08 PROCEDURE — 80048 BASIC METABOLIC PNL TOTAL CA: CPT

## 2023-05-08 PROCEDURE — G0378 HOSPITAL OBSERVATION PER HR: HCPCS

## 2023-05-08 PROCEDURE — 7100000011 HC PHASE II RECOVERY - ADDTL 15 MIN: Performed by: INTERNAL MEDICINE

## 2023-05-08 PROCEDURE — 2580000003 HC RX 258

## 2023-05-08 PROCEDURE — 6370000000 HC RX 637 (ALT 250 FOR IP): Performed by: INTERNAL MEDICINE

## 2023-05-08 PROCEDURE — 7100000010 HC PHASE II RECOVERY - FIRST 15 MIN: Performed by: INTERNAL MEDICINE

## 2023-05-08 PROCEDURE — 2500000003 HC RX 250 WO HCPCS

## 2023-05-08 PROCEDURE — 43239 EGD BIOPSY SINGLE/MULTIPLE: CPT | Performed by: INTERNAL MEDICINE

## 2023-05-08 PROCEDURE — 2580000003 HC RX 258: Performed by: INTERNAL MEDICINE

## 2023-05-08 PROCEDURE — 6360000002 HC RX W HCPCS

## 2023-05-08 PROCEDURE — 94640 AIRWAY INHALATION TREATMENT: CPT

## 2023-05-08 PROCEDURE — 2709999900 HC NON-CHARGEABLE SUPPLY: Performed by: INTERNAL MEDICINE

## 2023-05-08 PROCEDURE — 3609010300 HC COLONOSCOPY W/BIOPSY SINGLE/MULTIPLE: Performed by: INTERNAL MEDICINE

## 2023-05-08 PROCEDURE — 0DBE8ZX EXCISION OF LARGE INTESTINE, VIA NATURAL OR ARTIFICIAL OPENING ENDOSCOPIC, DIAGNOSTIC: ICD-10-PCS | Performed by: INTERNAL MEDICINE

## 2023-05-08 PROCEDURE — 6370000000 HC RX 637 (ALT 250 FOR IP)

## 2023-05-08 PROCEDURE — 3609012400 HC EGD TRANSORAL BIOPSY SINGLE/MULTIPLE: Performed by: INTERNAL MEDICINE

## 2023-05-08 PROCEDURE — 0DB78ZX EXCISION OF STOMACH, PYLORUS, VIA NATURAL OR ARTIFICIAL OPENING ENDOSCOPIC, DIAGNOSTIC: ICD-10-PCS | Performed by: INTERNAL MEDICINE

## 2023-05-08 PROCEDURE — 85025 COMPLETE CBC W/AUTO DIFF WBC: CPT

## 2023-05-08 PROCEDURE — 36415 COLL VENOUS BLD VENIPUNCTURE: CPT

## 2023-05-08 PROCEDURE — 3700000001 HC ADD 15 MINUTES (ANESTHESIA): Performed by: INTERNAL MEDICINE

## 2023-05-08 PROCEDURE — 88305 TISSUE EXAM BY PATHOLOGIST: CPT

## 2023-05-08 PROCEDURE — 3700000000 HC ANESTHESIA ATTENDED CARE: Performed by: INTERNAL MEDICINE

## 2023-05-08 PROCEDURE — 45380 COLONOSCOPY AND BIOPSY: CPT | Performed by: INTERNAL MEDICINE

## 2023-05-08 RX ORDER — SODIUM CHLORIDE, SODIUM LACTATE, POTASSIUM CHLORIDE, CALCIUM CHLORIDE 600; 310; 30; 20 MG/100ML; MG/100ML; MG/100ML; MG/100ML
INJECTION, SOLUTION INTRAVENOUS CONTINUOUS PRN
Status: DISCONTINUED | OUTPATIENT
Start: 2023-05-08 | End: 2023-05-08 | Stop reason: SDUPTHER

## 2023-05-08 RX ORDER — LIDOCAINE HYDROCHLORIDE 10 MG/ML
INJECTION, SOLUTION EPIDURAL; INFILTRATION; INTRACAUDAL; PERINEURAL PRN
Status: DISCONTINUED | OUTPATIENT
Start: 2023-05-08 | End: 2023-05-08 | Stop reason: SDUPTHER

## 2023-05-08 RX ORDER — PROPOFOL 10 MG/ML
INJECTION, EMULSION INTRAVENOUS PRN
Status: DISCONTINUED | OUTPATIENT
Start: 2023-05-08 | End: 2023-05-08 | Stop reason: SDUPTHER

## 2023-05-08 RX ORDER — IPRATROPIUM BROMIDE AND ALBUTEROL SULFATE 2.5; .5 MG/3ML; MG/3ML
SOLUTION RESPIRATORY (INHALATION)
Status: DISCONTINUED
Start: 2023-05-08 | End: 2023-05-08 | Stop reason: HOSPADM

## 2023-05-08 RX ORDER — POTASSIUM CHLORIDE 7.45 MG/ML
10 INJECTION INTRAVENOUS ONCE
Status: DISCONTINUED | OUTPATIENT
Start: 2023-05-08 | End: 2023-05-08

## 2023-05-08 RX ORDER — PROPOFOL 10 MG/ML
INJECTION, EMULSION INTRAVENOUS CONTINUOUS PRN
Status: DISCONTINUED | OUTPATIENT
Start: 2023-05-08 | End: 2023-05-08 | Stop reason: SDUPTHER

## 2023-05-08 RX ORDER — POTASSIUM CHLORIDE 20 MEQ/1
40 TABLET, EXTENDED RELEASE ORAL ONCE
Status: COMPLETED | OUTPATIENT
Start: 2023-05-08 | End: 2023-05-08

## 2023-05-08 RX ORDER — IPRATROPIUM BROMIDE AND ALBUTEROL SULFATE 2.5; .5 MG/3ML; MG/3ML
1 SOLUTION RESPIRATORY (INHALATION)
Status: DISCONTINUED | OUTPATIENT
Start: 2023-05-08 | End: 2023-05-08 | Stop reason: HOSPADM

## 2023-05-08 RX ADMIN — GLUCAGON HYDROCHLORIDE 1 MG: KIT at 10:50

## 2023-05-08 RX ADMIN — LIDOCAINE HYDROCHLORIDE 50 MG: 10 INJECTION, SOLUTION EPIDURAL; INFILTRATION; INTRACAUDAL; PERINEURAL at 10:25

## 2023-05-08 RX ADMIN — PROPOFOL 30 MG: 10 INJECTION, EMULSION INTRAVENOUS at 11:01

## 2023-05-08 RX ADMIN — PROPOFOL 40 MG: 10 INJECTION, EMULSION INTRAVENOUS at 10:54

## 2023-05-08 RX ADMIN — TIOTROPIUM BROMIDE INHALATION SPRAY 2 PUFF: 3.12 SPRAY, METERED RESPIRATORY (INHALATION) at 08:02

## 2023-05-08 RX ADMIN — SODIUM CHLORIDE, POTASSIUM CHLORIDE, SODIUM LACTATE AND CALCIUM CHLORIDE: 600; 310; 30; 20 INJECTION, SOLUTION INTRAVENOUS at 10:17

## 2023-05-08 RX ADMIN — PROPOFOL 30 MG: 10 INJECTION, EMULSION INTRAVENOUS at 10:32

## 2023-05-08 RX ADMIN — PROPOFOL 40 MG: 10 INJECTION, EMULSION INTRAVENOUS at 10:43

## 2023-05-08 RX ADMIN — PROPOFOL 50 MG: 10 INJECTION, EMULSION INTRAVENOUS at 10:28

## 2023-05-08 RX ADMIN — PROPOFOL 75 MCG/KG/MIN: 10 INJECTION, EMULSION INTRAVENOUS at 10:26

## 2023-05-08 RX ADMIN — PROPOFOL 40 MG: 10 INJECTION, EMULSION INTRAVENOUS at 10:50

## 2023-05-08 RX ADMIN — POTASSIUM CHLORIDE 40 MEQ: 1500 TABLET, EXTENDED RELEASE ORAL at 11:58

## 2023-05-08 RX ADMIN — CETIRIZINE HYDROCHLORIDE 10 MG: 10 TABLET ORAL at 11:58

## 2023-05-08 RX ADMIN — SODIUM CHLORIDE, PRESERVATIVE FREE 10 ML: 5 INJECTION INTRAVENOUS at 11:59

## 2023-05-08 RX ADMIN — PROPOFOL 30 MG: 10 INJECTION, EMULSION INTRAVENOUS at 10:46

## 2023-05-08 RX ADMIN — BUDESONIDE AND FORMOTEROL FUMARATE DIHYDRATE 2 PUFF: 160; 4.5 AEROSOL RESPIRATORY (INHALATION) at 08:01

## 2023-05-08 RX ADMIN — HYDROCHLOROTHIAZIDE 25 MG: 25 TABLET ORAL at 11:58

## 2023-05-08 RX ADMIN — PROPOFOL 30 MG: 10 INJECTION, EMULSION INTRAVENOUS at 10:34

## 2023-05-08 RX ADMIN — PROPOFOL 50 MG: 10 INJECTION, EMULSION INTRAVENOUS at 10:31

## 2023-05-08 ASSESSMENT — PAIN SCALES - GENERAL: PAINLEVEL_OUTOF10: 0

## 2023-05-08 ASSESSMENT — COPD QUESTIONNAIRES: CAT_SEVERITY: SEVERE

## 2023-05-08 ASSESSMENT — LIFESTYLE VARIABLES: SMOKING_STATUS: 1

## 2023-05-08 NOTE — ANESTHESIA PRE PROCEDURE
use: Yes     Alcohol/week: 0.0 standard drinks     Comment: ocasionally, beer                                Ready to quit: Not Answered  Counseling given: Not Answered  Tobacco comments: quit on 4/27/2011, resumed smoking 11/2018      Vital Signs (Current):   Vitals:    05/07/23 1830 05/08/23 0021 05/08/23 0722 05/08/23 0845   BP: 115/80 109/72 104/77 108/74   Pulse: 78 83  80   Resp:  16  18   Temp: 97.7 °F (36.5 °C) 97.9 °F (36.6 °C) 97.8 °F (36.6 °C) 97.7 °F (36.5 °C)   TempSrc: Oral Oral Oral Temporal   SpO2: 100% 100% 100% 100%   Weight:       Height:                                                  BP Readings from Last 3 Encounters:   05/08/23 108/74   04/12/23 107/82   09/19/22 121/76       NPO Status: Time of last liquid consumption: 2000                        Time of last solid consumption: 2000                        Date of last liquid consumption: 05/06/23                        Date of last solid food consumption: 05/06/23    BMI:   Wt Readings from Last 3 Encounters:   05/05/23 110 lb (49.9 kg)   09/19/22 106 lb (48.1 kg)   04/28/22 100 lb (45.4 kg)     Body mass index is 16.73 kg/m².     CBC:   Lab Results   Component Value Date/Time    WBC 4.7 05/08/2023 06:26 AM    RBC 3.19 05/08/2023 06:26 AM    RBC 3.30 08/29/2011 08:12 AM    HGB 10.7 05/08/2023 06:26 AM    HCT 33.3 05/08/2023 06:26 AM    .4 05/08/2023 06:26 AM    RDW 12.7 05/08/2023 06:26 AM     05/08/2023 06:26 AM     08/29/2011 08:12 AM       CMP:   Lab Results   Component Value Date/Time     05/08/2023 06:26 AM    K 3.3 05/08/2023 06:26 AM     05/08/2023 06:26 AM    CO2 25 05/08/2023 06:26 AM    BUN 5 05/08/2023 06:26 AM    CREATININE 0.45 05/08/2023 06:26 AM    GFRAA >60 04/28/2022 02:59 PM    LABGLOM >60 05/08/2023 06:26 AM    GLUCOSE 87 05/08/2023 06:26 AM    GLUCOSE 109 08/29/2011 08:12 AM    PROT 6.7 05/05/2023 07:03 PM    CALCIUM 8.9 05/08/2023 06:26 AM    BILITOT 0.4 05/05/2023 07:03 PM    ALKPHOS 74

## 2023-05-08 NOTE — DISCHARGE INSTRUCTIONS
You were seen today in the emergency department for your rectal bleeding. We have evaluated you and determined that you likely have bleeding due to internal hemorrhoids. We now feel you are safe for discharge home. Please return to the emergency department immediately if develop any new or worsening concerns including chest pain, shortness of breath, abdominal pain, nausea, vomiting, diarrhea, weakness, loss consciousness, fever, chills, increased bleeding per rectum, or any other concerns. Please call your PCP and schedule appointment within the next 24 to 48 hours for follow-up. We recommend you get an outpatient CT scan of your chest for screening.

## 2023-05-08 NOTE — OP NOTE
Operative Note      Patient: Shannen Rodney  YOB: 1970  MRN: [id number]    Date of Procedure: 5/8/2023    Pre-Op Diagnosis Codes:     * Rectal bleeding [K62.5]    Post-Op Diagnosis:   Small 2 cm hiatal hernia  Otherwise normal esophagus stomach and duodenum  Pancolonic diverticulosis, otherwise normal colon  Small internal hemorrhoids       Procedure(s):  *ADD ON* EGD ESOPHAGOGASTRODUODENOSCOPY  COLONOSCOPY DIAGNOSTIC    Surgeon(s):  Zachery Ya MD    Assistant:   First Assistant: Whit Valverde RN; Tonya Quintana RN    Anesthesia: Monitor Anesthesia Care    Estimated Blood Loss (mL): Minimal    Complications: None    Specimens:   ID Type Source Tests Collected by Time Destination   A : GASTRIC BX, CONCERN FOR H. 883 Dulce Merlos MD 5/8/2023 1036        Implants:  * No implants in log *      Drains: * No LDAs found *    Findings:   EGD  Regular Z-line noted at 38 cm from the incisors. 2 cm hiatal hernia was noted. Otherwise the esophagus appeared normal.  Normal stomach mucosa on forward and retroflexed views. Biopsies were performed from the antrum and gastric body to rule out H. pylori. Normal examined duodenum. Colonoscopy  Multiple large and small mouth diverticulosis were found in the entire colon. The background colon mucosa otherwise appeared normal.  Random biopsies were performed to assess for microscopic colitis. Small internal hemorrhoids were found in the distal rectum on retroflexion. Recommendations  Await pathology results. If positive for H. pylori, please treat with 2 weeks of antibiotic therapy with once daily high-dose PPI. If positive for microscopic colitis will need treatment with budesonide and follow-up in the GI clinic. Follow an antireflux lifestyle and GERD prevention diet. Okay to discharge from GI standpoint. GI will sign off. Please call for any questions or concerns.       Detailed Description of

## 2023-05-08 NOTE — PROGRESS NOTES
OBS/CDU   RESIDENT NOTE      Patients PCP is:  Romayne Sabins, MD        SUBJECTIVE      No acute events overnight. Has been able to tolerate a full diet without nausea or vomiting. The patient is urinating on his own and is passing flatus. Denies fever, chills, nausea, vomiting, chest pain, shortness of breath, abdominal pain, focal weakness, numbness, tingling, urinary/bowel symptoms, vision changes, visual hallucinations, or headache. States she had one bloody BM overnight. Otherwise doing well. PHYSICAL EXAM      General: NAD, AO X 3  Heent: EMOI, PERRL  Neck: SUPPLE, NO JVD  Cardiovascular: RRR, S1S2  Pulmonary: CTAB, NO SOB  Abdomen: SOFT, NTTP, ND, +BS  Extremities: +2/4 PULSES DISTAL, NO SWELLING  Neuro / Psych: NO NUMBNESS OR TINGLING, MENTATION AT BASELINE    PERTINENT TEST /EXAMS      I have reviewed all available laboratory results. MEDICATIONS CURRENT   cetirizine (ZYRTEC) tablet 10 mg, Daily  montelukast (SINGULAIR) tablet 10 mg, Nightly  pantoprazole (PROTONIX) tablet 40 mg, QAM AC  budesonide-formoterol (SYMBICORT) 160-4.5 MCG/ACT inhaler 2 puff, BID  albuterol sulfate HFA (PROVENTIL;VENTOLIN;PROAIR) 108 (90 Base) MCG/ACT inhaler 2 puff, Q4H PRN  tiotropium (SPIRIVA RESPIMAT) 2.5 MCG/ACT inhaler 2 puff, Daily  sodium chloride flush 0.9 % injection 5-40 mL, 2 times per day  sodium chloride flush 0.9 % injection 5-40 mL, PRN  0.9 % sodium chloride infusion, PRN  acetaminophen (TYLENOL) tablet 650 mg, Q4H PRN  ondansetron (ZOFRAN-ODT) disintegrating tablet 4 mg, Q8H PRN   Or  ondansetron (ZOFRAN) injection 4 mg, Q6H PRN  hydroCHLOROthiazide (HYDRODIURIL) tablet 25 mg, Daily  nicotine (NICODERM CQ) 14 MG/24HR 1 patch, Daily        All medication charted and reviewed.     CONSULTS      IP CONSULT TO GI  IP CONSULT TO PULMONOLOGY    ASSESSMENT/PLAN       Flori Coker is a 47 y.o. female who presents with bleeding per rectum and abdominal cramping    Gastroenterology consult, plan on

## 2023-05-08 NOTE — ANESTHESIA POSTPROCEDURE EVALUATION
Department of Anesthesiology  Postprocedure Note    Patient: Zhane Raphael  MRN: [id number]  YOB: 1970  Date of evaluation: 5/8/2023      Procedure Summary     Date: 05/08/23 Room / Location: [address]    Anesthesia Start: 1021 Anesthesia Stop: 0430    Procedures:       EGD BIOPSY      COLONOSCOPY WITH BIOPSY Diagnosis:       Rectal bleeding      (Rectal bleeding [K62.5])    Surgeons: Belen Alicea MD Responsible Provider: Moriah Romero MD    Anesthesia Type: MAC ASA Status: 3          Anesthesia Type: No value filed.     Neisha Phase I: Neisha Score: 10    Neisha Phase II: Neisha Score: 10      Anesthesia Post Evaluation    Patient location during evaluation: PACU  Patient participation: complete - patient participated  Level of consciousness: awake and alert  Pain score: 0  Airway patency: patent  Nausea & Vomiting: no nausea and no vomiting  Complications: no  Cardiovascular status: hemodynamically stable  Respiratory status: acceptable  Hydration status: euvolemic

## 2023-05-08 NOTE — PROGRESS NOTES
[facility]  CDU / OBSERVATION eNCOUnter  Attending NOte       I performed a history and physical examination of the patient and discussed management with the resident. This patient was placed in the observation unit for reevaluation for possible admission to the hospital. I reviewed the residents note and agree with the documented findings and plan of care. Any areas of disagreement are noted on the chart. I was personally present for the key portions of any procedures. I have documented in the chart those procedures where I was not present during the key portions. I have reviewed the nurses notes. I agree with the chief complaint, past medical history, past surgical history, allergies, medications, social and family history as documented unless otherwise noted below. The patient was placed in the observation unit for reevaluation for possible admission to the hospital.      The Family history, social history, and ROS are effectively unchanged since admission unless noted elsewhere in the chart. Scope results reviewed. Plan for discharge home today.      Kaylen Mcallister MD  Attending Emergency  Physician

## 2023-05-09 ENCOUNTER — TELEPHONE (OUTPATIENT)
Dept: FAMILY MEDICINE CLINIC | Age: 53
End: 2023-05-09

## 2023-05-09 DIAGNOSIS — J45.50 POORLY CONTROLLED SEVERE PERSISTENT ASTHMA WITHOUT COMPLICATION: ICD-10-CM

## 2023-05-09 LAB — SURGICAL PATHOLOGY REPORT: NORMAL

## 2023-05-09 RX ORDER — MONTELUKAST SODIUM 10 MG/1
10 TABLET ORAL NIGHTLY
Qty: 90 TABLET | Refills: 3 | Status: SHIPPED | OUTPATIENT
Start: 2023-05-09 | End: 2024-05-08

## 2023-05-09 RX ORDER — ALBUTEROL SULFATE 90 UG/1
2 AEROSOL, METERED RESPIRATORY (INHALATION) 4 TIMES DAILY PRN
Qty: 18 G | Refills: 1 | Status: SHIPPED | OUTPATIENT
Start: 2023-05-09 | End: 2023-05-12 | Stop reason: SDUPTHER

## 2023-05-09 NOTE — TELEPHONE ENCOUNTER
Last visit: 9/17/2021  Last Med refill: 1/2023  Does patient have enough medication for 72 hours: No:     Next Visit Date:  Future Appointments   Date Time Provider Hernandez Amezcua   5/22/2023  2:30 PM Corey Ellis MD [address] [place] [address] Maintenance   Topic Date Due    COVID-19 Vaccine (1) Never done    Cervical cancer screen  06/20/2017    Shingles vaccine (1 of 2) Never done    Depression Screen  08/31/2022    DTaP/Tdap/Td vaccine (2 - Td or Tdap) 01/11/2023    Flu vaccine (Season Ended) 08/01/2023    Breast cancer screen  10/14/2023    Lipids  08/24/2025    Colorectal Cancer Screen  05/08/2033    Pneumococcal 0-64 years Vaccine  Completed    HIV screen  Completed    Hepatitis A vaccine  Aged Out    Hib vaccine  Aged Out    Meningococcal (ACWY) vaccine  Aged Out       Hemoglobin A1C (%)   Date Value   09/01/2021 4.3             ( goal A1C is < 7)   Microalb/Crt.  Ratio (mcg/mg creat)   Date Value   07/29/2015 4     LDL Cholesterol (mg/dL)   Date Value   08/24/2020 58   07/29/2015 59       (goal LDL is <100)   AST (U/L)   Date Value   05/05/2023 20     ALT (U/L)   Date Value   05/05/2023 18     BUN (mg/dL)   Date Value   05/08/2023 5 (L)     BP Readings from Last 3 Encounters:   05/08/23 122/83   04/12/23 107/82   09/19/22 121/76          (goal 120/80)    All Future Testing planned in [facility]  Lab Frequency Next Occurrence   CBC with Auto Differential Once 09/19/2022   Comprehensive Metabolic Panel with Bilirubin Once 09/19/2022   Liver Fibrosis, Chronic Viral Hepatitis Once 09/19/2022   Hepatitis A Antibody, Total Once 09/19/2022   Hepatitis C Genotype Once 09/19/2022   Hepatitis C RNA, quantitative, PCR Once 09/19/2022   Urine Drug Screen Once 09/19/2022   Ethanol Once 09/19/2022   HIV Screen Once 09/19/2022   Hepatitis B Surface Antibody Once 09/19/2022   Hepatitis B Core Antibody, Total Once 12/18/2022               Patient Active Problem List:     Asthma     COPD (chronic obstructive pulmonary

## 2023-05-09 NOTE — TELEPHONE ENCOUNTER
Care Transitions Initial Follow Up Call    Outreach made within 2 business days of discharge: Yes    Patient: Aleah Orozco Patient : 1970   MRN: [id number]  Reason for Admission: There are no discharge diagnoses documented for the most recent discharge. Discharge Date: 23       Spoke with: Left message    Follow Up  No future appointments.     Abigail Yepez

## 2023-05-10 ENCOUNTER — TELEPHONE (OUTPATIENT)
Dept: PULMONOLOGY | Age: 53
End: 2023-05-10

## 2023-05-10 LAB
ALANINE AMINOTRANSFERASE, FIBROMETER: 21 U/L (ref 5–40)
ALPHA-2-MACROGLOBULIN, FIBROMETER: 269 MG/DL (ref 131–293)
ASPARTATE AMINOTRANSFERASE, FIBROMETER: 27 U/L (ref 9–40)
CIRRHOMETER PATIENT SCORE: 0
EER FIBROMETER REPORT: ABNORMAL
FIBROMETER INTERPRETATION: ABNORMAL
FIBROMETER PATIENT SCORE: 0.19
FIBROMETER PLATELET COUNT: 248
FIBROMETER PROTHROMBIN INDEX: 121 % (ref 90–120)
FIBROSIS METAVIR CLASSIFICATION: ABNORMAL
GAMMA GLUTAMYL TRANSFERASE, FIBROMETER: 106 U/L (ref 7–33)
INFLAMETER METAVIR CLASSIFICATION: ABNORMAL
INFLAMETER PATIENT SCORE: 0.19
UREA NITROGEN, FIBROMETER: 12 MG/DL (ref 7–20)

## 2023-05-10 NOTE — TELEPHONE ENCOUNTER
----- Message from Rama Mason MA sent at 5/8/2023  7:11 AM EDT -----  Regarding: FW: Followup needed  Francy Ruiz,     Please see Dr. Saima Castillo message below   ----- Message -----  From: Merlyn Maldonado MD  Sent: 5/6/2023   4:15 PM EDT  To: Oliver Brennan DO, #  Subject: Followup needed                                  [facility] team, I hope you're well! Please call this patient for follow-up visit.     Patient seen previously by Becca  Diagnosis: COPD, asthma, weight loss  Seen by me (y/n): Y inpatient  Recommended follow up timeline: 2-4 weeks  Other tests needed: CT chest for lung cancer     Other history:  Hematochezia, planning for colonscopy  Started on spriva

## 2023-05-12 ENCOUNTER — TELEPHONE (OUTPATIENT)
Dept: FAMILY MEDICINE CLINIC | Age: 53
End: 2023-05-12

## 2023-05-12 DIAGNOSIS — J44.9 CHRONIC OBSTRUCTIVE PULMONARY DISEASE, UNSPECIFIED COPD TYPE (HCC): Primary | ICD-10-CM

## 2023-05-12 RX ORDER — ALBUTEROL SULFATE 90 UG/1
2 AEROSOL, METERED RESPIRATORY (INHALATION) 4 TIMES DAILY PRN
Qty: 18 G | Refills: 1 | Status: SHIPPED | OUTPATIENT
Start: 2023-05-12 | End: 2023-06-11

## 2023-05-12 NOTE — TELEPHONE ENCOUNTER
Patient pharmacy is requesting an diagnosis association code for the patient ventolin inhaler so the patient insurance can finish the patient PA.  Thank you

## 2023-05-31 ENCOUNTER — OFFICE VISIT (OUTPATIENT)
Dept: FAMILY MEDICINE CLINIC | Age: 53
End: 2023-05-31
Payer: COMMERCIAL

## 2023-05-31 VITALS
BODY MASS INDEX: 14.55 KG/M2 | HEIGHT: 68 IN | SYSTOLIC BLOOD PRESSURE: 130 MMHG | WEIGHT: 96 LBS | HEART RATE: 62 BPM | DIASTOLIC BLOOD PRESSURE: 80 MMHG

## 2023-05-31 DIAGNOSIS — Z87.891 PERSONAL HISTORY OF TOBACCO USE: ICD-10-CM

## 2023-05-31 DIAGNOSIS — I10 PRIMARY HYPERTENSION: ICD-10-CM

## 2023-05-31 DIAGNOSIS — E43 SEVERE PROTEIN-CALORIE MALNUTRITION (HCC): Primary | ICD-10-CM

## 2023-05-31 PROBLEM — F17.200 SMOKER: Status: ACTIVE | Noted: 2023-05-31

## 2023-05-31 PROCEDURE — 99213 OFFICE O/P EST LOW 20 MIN: CPT | Performed by: STUDENT IN AN ORGANIZED HEALTH CARE EDUCATION/TRAINING PROGRAM

## 2023-05-31 PROCEDURE — 3074F SYST BP LT 130 MM HG: CPT | Performed by: STUDENT IN AN ORGANIZED HEALTH CARE EDUCATION/TRAINING PROGRAM

## 2023-05-31 PROCEDURE — 3078F DIAST BP <80 MM HG: CPT | Performed by: STUDENT IN AN ORGANIZED HEALTH CARE EDUCATION/TRAINING PROGRAM

## 2023-05-31 RX ORDER — BUPROPION HYDROCHLORIDE 100 MG/1
100 TABLET, EXTENDED RELEASE ORAL 2 TIMES DAILY
Qty: 60 TABLET | Refills: 3 | Status: SHIPPED | OUTPATIENT
Start: 2023-05-31

## 2023-05-31 RX ORDER — POLYETHYLENE GLYCOL 3350 17 G
4 POWDER IN PACKET (EA) ORAL
Qty: 100 EACH | Refills: 3 | Status: SHIPPED | OUTPATIENT
Start: 2023-05-31

## 2023-05-31 SDOH — ECONOMIC STABILITY: INCOME INSECURITY: HOW HARD IS IT FOR YOU TO PAY FOR THE VERY BASICS LIKE FOOD, HOUSING, MEDICAL CARE, AND HEATING?: HARD

## 2023-05-31 SDOH — ECONOMIC STABILITY: HOUSING INSECURITY
IN THE LAST 12 MONTHS, WAS THERE A TIME WHEN YOU DID NOT HAVE A STEADY PLACE TO SLEEP OR SLEPT IN A SHELTER (INCLUDING NOW)?: NO

## 2023-05-31 SDOH — ECONOMIC STABILITY: FOOD INSECURITY: WITHIN THE PAST 12 MONTHS, THE FOOD YOU BOUGHT JUST DIDN'T LAST AND YOU DIDN'T HAVE MONEY TO GET MORE.: OFTEN TRUE

## 2023-05-31 SDOH — ECONOMIC STABILITY: FOOD INSECURITY: WITHIN THE PAST 12 MONTHS, YOU WORRIED THAT YOUR FOOD WOULD RUN OUT BEFORE YOU GOT MONEY TO BUY MORE.: OFTEN TRUE

## 2023-05-31 ASSESSMENT — PATIENT HEALTH QUESTIONNAIRE - PHQ9
1. LITTLE INTEREST OR PLEASURE IN DOING THINGS: 0
SUM OF ALL RESPONSES TO PHQ QUESTIONS 1-9: 1
SUM OF ALL RESPONSES TO PHQ9 QUESTIONS 1 & 2: 1
SUM OF ALL RESPONSES TO PHQ QUESTIONS 1-9: 1
2. FEELING DOWN, DEPRESSED OR HOPELESS: 1

## 2023-08-07 ENCOUNTER — CARE COORDINATION (OUTPATIENT)
Dept: CARE COORDINATION | Age: 53
End: 2023-08-07

## 2023-08-07 NOTE — CARE COORDINATION
SW attempt to reach patient to address SDOH. Unable to reach. Left detailed message. Will make another attempt.

## 2023-08-14 ENCOUNTER — CARE COORDINATION (OUTPATIENT)
Dept: CARE COORDINATION | Age: 53
End: 2023-08-14

## 2023-08-14 NOTE — CARE COORDINATION
Referral from [facility], Concepcion Morales RN-  I received a SDOH referral for this Patient. Jared Aragon MSW, has been trying to contact Patient for SDOH. I have not enrolled this Patient in [facility]. I reviewed PCP's note from June and one the plans of care from the end of the note is copied and pasted below:     1. Severe protein-calorie malnutrition [facility]) [facility] Referral for Social Determinants of Health (Primary Care Practices)     Attempted to reach patient for consult. M with contact information. Will attempt to reach again within 1 week.   JESSICA Bashir

## 2023-08-14 NOTE — CARE COORDINATION
Registered Dietitian Initial Assessment for Care Coordination      Name-Sonia Dutta  August 14, 2023    Initial Referral Reason: Protein- Calorie malnutrition    Patient Care Team:  Minerva Sadler MD as PCP - General (Emergency Medicine)  Alvin Costa DO as Consulting Physician (Pulmonology)  Laura Baker MD as Consulting Physician (Gastroenterology)  Suresh Esparza RD, LD as Dietitian    Patient Active Problem List   Diagnosis    Asthma    COPD (chronic obstructive pulmonary disease) ([address] )    Hepatitis C    GERD (gastroesophageal reflux disease)    Hyperlipidemia    HTN (hypertension)    Allergic dermatitis    Rash    Vaginal discharge    Cervicitis    Viral gastroenteritis    Eosinophilic asthma    Gassiness    Unintentional weight loss    Chronic fatigue    Plantar wart    Poorly controlled severe persistent asthma without complication    Tobacco use    Lower GI bleed    Pancolonic diverticulosis    Abnormal intentional weight loss    Lower GI bleeding    Severe protein-calorie malnutrition ([address] )    Smoker    Personal history of tobacco use       Current Outpatient Medications   Medication Sig Dispense Refill    nicotine polacrilex (COMMIT) 4 MG lozenge Take 1 lozenge by mouth every 2 hours as needed for Smoking cessation 100 each 3    buPROPion (WELLBUTRIN SR) 100 MG extended release tablet Take 1 tablet by mouth 2 times daily 60 tablet 3    albuterol sulfate HFA (VENTOLIN HFA) 108 (90 Base) MCG/ACT inhaler Inhale 2 puffs into the lungs 4 times daily as needed for Wheezing 18 g 1    montelukast (SINGULAIR) 10 MG tablet Take 1 tablet by mouth nightly 90 tablet 3    cetirizine (ZYRTEC) 10 MG tablet take 1 tablet by mouth once daily 30 tablet 3    hydroCHLOROthiazide (HYDRODIURIL) 25 MG tablet take 1 tablet by mouth once daily 30 tablet 2    omeprazole (PRILOSEC) 20 MG delayed release capsule take 1 capsule by mouth once daily 30 capsule 3    glecaprevir-pibrentasvir (MAVYRET) 100-40 MG TABS

## 2023-08-18 DIAGNOSIS — J44.9 CHRONIC OBSTRUCTIVE PULMONARY DISEASE, UNSPECIFIED COPD TYPE (HCC): ICD-10-CM

## 2023-08-18 DIAGNOSIS — J45.50 POORLY CONTROLLED SEVERE PERSISTENT ASTHMA WITHOUT COMPLICATION: ICD-10-CM

## 2023-08-18 RX ORDER — BUDESONIDE AND FORMOTEROL FUMARATE DIHYDRATE 160; 4.5 UG/1; UG/1
AEROSOL RESPIRATORY (INHALATION)
Qty: 30.6 G | OUTPATIENT
Start: 2023-08-18

## 2023-08-18 NOTE — TELEPHONE ENCOUNTER
E-scribe request for med refills. Please review and e-scribe if applicable.      Last Visit Date:  5/31/23  Next Visit Date:  Visit date not found    Hemoglobin A1C (%)   Date Value   09/01/2021 4.3             ( goal A1C is < 7)   No components found for: LABMICR  LDL Cholesterol (mg/dL)   Date Value   08/24/2020 58       (goal LDL is <100)   AST (U/L)   Date Value   05/05/2023 20     ALT (U/L)   Date Value   05/05/2023 18     BUN (mg/dL)   Date Value   05/08/2023 5 (L)     BP Readings from Last 3 Encounters:   06/12/23 (!) 135/90   05/31/23 130/80   05/08/23 122/83          (goal 120/80)        Patient Active Problem List:     Asthma     COPD (chronic obstructive pulmonary disease) (HCC)     Hepatitis C     GERD (gastroesophageal reflux disease)     Hyperlipidemia     HTN (hypertension)     Allergic dermatitis     Rash     Vaginal discharge     Cervicitis     Viral gastroenteritis     Eosinophilic asthma     Gassiness     Unintentional weight loss     Chronic fatigue     Plantar wart     Poorly controlled severe persistent asthma without complication     Tobacco use     Lower GI bleed     Pancolonic diverticulosis     Abnormal intentional weight loss     Lower GI bleeding     Severe protein-calorie malnutrition ([address])     Smoker     Personal history of tobacco use      ----Paola Parish

## 2023-08-18 NOTE — TELEPHONE ENCOUNTER
Per telephone encounter from 1/30/23, to be seen in office before any further refills. Medication refused.

## 2023-08-21 RX ORDER — ALBUTEROL SULFATE 90 UG/1
AEROSOL, METERED RESPIRATORY (INHALATION)
Qty: 18 G | Refills: 1 | Status: SHIPPED | OUTPATIENT
Start: 2023-08-21

## 2023-08-28 ENCOUNTER — OFFICE VISIT (OUTPATIENT)
Dept: PULMONOLOGY | Age: 53
End: 2023-08-28
Payer: COMMERCIAL

## 2023-08-28 VITALS
SYSTOLIC BLOOD PRESSURE: 108 MMHG | HEIGHT: 68 IN | WEIGHT: 98 LBS | RESPIRATION RATE: 16 BRPM | OXYGEN SATURATION: 97 % | BODY MASS INDEX: 14.85 KG/M2 | DIASTOLIC BLOOD PRESSURE: 76 MMHG | HEART RATE: 73 BPM

## 2023-08-28 DIAGNOSIS — F17.210 SMOKING GREATER THAN 40 PACK YEARS: ICD-10-CM

## 2023-08-28 DIAGNOSIS — Z87.891 PERSONAL HISTORY OF TOBACCO USE: ICD-10-CM

## 2023-08-28 DIAGNOSIS — F43.9 SITUATIONAL STRESS: ICD-10-CM

## 2023-08-28 DIAGNOSIS — K64.8 HEMORRHOIDS, INTERNAL, WITH BLEEDING: ICD-10-CM

## 2023-08-28 DIAGNOSIS — J44.9 STAGE 3 SEVERE COPD BY GOLD CLASSIFICATION (HCC): Primary | ICD-10-CM

## 2023-08-28 DIAGNOSIS — J44.1 ACUTE EXACERBATION OF CHRONIC OBSTRUCTIVE PULMONARY DISEASE (COPD) (HCC): ICD-10-CM

## 2023-08-28 PROCEDURE — G8419 CALC BMI OUT NRM PARAM NOF/U: HCPCS | Performed by: INTERNAL MEDICINE

## 2023-08-28 PROCEDURE — 4004F PT TOBACCO SCREEN RCVD TLK: CPT | Performed by: INTERNAL MEDICINE

## 2023-08-28 PROCEDURE — 3017F COLORECTAL CA SCREEN DOC REV: CPT | Performed by: INTERNAL MEDICINE

## 2023-08-28 PROCEDURE — 3023F SPIROM DOC REV: CPT | Performed by: INTERNAL MEDICINE

## 2023-08-28 PROCEDURE — 99214 OFFICE O/P EST MOD 30 MIN: CPT | Performed by: INTERNAL MEDICINE

## 2023-08-28 PROCEDURE — G8427 DOCREV CUR MEDS BY ELIG CLIN: HCPCS | Performed by: INTERNAL MEDICINE

## 2023-08-28 PROCEDURE — 3074F SYST BP LT 130 MM HG: CPT | Performed by: INTERNAL MEDICINE

## 2023-08-28 PROCEDURE — G0296 VISIT TO DETERM LDCT ELIG: HCPCS | Performed by: INTERNAL MEDICINE

## 2023-08-28 PROCEDURE — 3078F DIAST BP <80 MM HG: CPT | Performed by: INTERNAL MEDICINE

## 2023-08-28 RX ORDER — MONTELUKAST SODIUM 10 MG/1
10 TABLET ORAL NIGHTLY
Qty: 90 TABLET | Refills: 3 | Status: SHIPPED | OUTPATIENT
Start: 2023-08-28 | End: 2024-08-27

## 2023-08-28 RX ORDER — ALBUTEROL SULFATE 90 UG/1
2 AEROSOL, METERED RESPIRATORY (INHALATION) EVERY 6 HOURS PRN
Qty: 18 G | Refills: 11 | Status: SHIPPED | OUTPATIENT
Start: 2023-08-28

## 2023-08-28 RX ORDER — ALBUTEROL SULFATE 2.5 MG/3ML
2.5 SOLUTION RESPIRATORY (INHALATION) EVERY 6 HOURS PRN
Qty: 120 EACH | Refills: 11 | Status: SHIPPED | OUTPATIENT
Start: 2023-08-28

## 2023-08-28 RX ORDER — AZITHROMYCIN 250 MG/1
250 TABLET, FILM COATED ORAL SEE ADMIN INSTRUCTIONS
Qty: 6 TABLET | Refills: 0 | Status: SHIPPED | OUTPATIENT
Start: 2023-08-28 | End: 2023-09-02

## 2023-08-28 RX ORDER — BUDESONIDE AND FORMOTEROL FUMARATE DIHYDRATE 160; 4.5 UG/1; UG/1
2 AEROSOL RESPIRATORY (INHALATION) 2 TIMES DAILY
Qty: 3 EACH | Refills: 3 | Status: SHIPPED | OUTPATIENT
Start: 2023-08-28

## 2023-08-28 ASSESSMENT — ENCOUNTER SYMPTOMS
GASTROINTESTINAL NEGATIVE: 1
EYES NEGATIVE: 1
COUGH: 1
SHORTNESS OF BREATH: 1

## 2023-08-28 NOTE — PROGRESS NOTES
(PROVENTIL) (2.5 MG/3ML) 0.083% nebulizer solution Take 3 mLs by nebulization every 6 hours as needed for Wheezing 120 each 11    azithromycin (ZITHROMAX) 250 MG tablet Take 1 tablet by mouth See Admin Instructions for 5 days 500mg on day 1 followed by 250mg on days 2 - 5 6 tablet 0    nicotine polacrilex (COMMIT) 4 MG lozenge Take 1 lozenge by mouth every 2 hours as needed for Smoking cessation 100 each 3    buPROPion (WELLBUTRIN SR) 100 MG extended release tablet Take 1 tablet by mouth 2 times daily 60 tablet 3    cetirizine (ZYRTEC) 10 MG tablet take 1 tablet by mouth once daily 30 tablet 3    hydroCHLOROthiazide (HYDRODIURIL) 25 MG tablet take 1 tablet by mouth once daily 30 tablet 2    omeprazole (PRILOSEC) 20 MG delayed release capsule take 1 capsule by mouth once daily 30 capsule 3    glecaprevir-pibrentasvir (MAVYRET) 100-40 MG TABS tablet Take 3 tabs daily for 8 weeks 168 tablet 0    potassium chloride (KLOR-CON M) 20 MEQ extended release tablet take 1 tablet by mouth once daily 30 tablet 0    Nutritional Supplements (ENSURE COMPLETE SHAKE) LIQD Take 1 Bottle by mouth daily Chocolate flavor. 30 Bottle 0    Salicylic Acid 28 % SOLN Apply to wart on left foot daily for up to 12 weeks. 1 Bottle 0    varenicline (CHANTIX CONTINUING MONTH SRINATH) 1 MG tablet Take 1 tablet by mouth daily 30 tablet 5    Elastic Bandages & Supports (WRAP/PAIN FREE) MISC Wrap for right arm. Apply as needed. 1 each 0    cyclobenzaprine (FLEXERIL) 5 MG tablet take 1 tablet by mouth twice a day 15 tablet 0    lidocaine (LIDODERM) 5 % Place 1 patch onto the skin daily 12 hours on, 12 hours off.  30 patch 0    fluticasone (FLONASE) 50 MCG/ACT nasal spray 2 sprays by Each Nare route daily 1 Bottle 0    nystatin (MYCOSTATIN) 793540 UNIT/ML suspension swish and swallow 2.5 milliliters ON EACH SIDE OF MOUTH four times a day - CONTINUE TO USE FOR 2 DAYS AFTER THRUSH RESOLVES 280 mL 2    Handicap Placard MISC by Does not apply route For 3 years 1

## 2023-08-29 ENCOUNTER — CARE COORDINATION (OUTPATIENT)
Dept: CARE COORDINATION | Age: 53
End: 2023-08-29

## 2023-08-29 NOTE — CARE COORDINATION
Nutrition Care Coordinator Follow-Up visit:    Food Recall:eating 3 meals/d    Activity Level:  Sedentary:X  Lightly Active: Moderately Active:  Very Active:    Adult BMI:  Underweight (below 18.5)X  Normal Weight (18.5-24.9)  Overweight (25-29. 9)  Obese (30-39. 9)  Morbidly Obese (>40)    Weight Change:up 2#     Plan:  Plan was established with patient:  Small frequent meals: X  Increase protein intake: X  Ensure complete twice daily: X    Monitoring: Will monitor weight:X  Will monitor adherence to meal plan: Will monitor adherence to exercise plan: Will monitor HGA1c:    Handouts Provided :  High protein foods: X  Strategies for weight gain: X    Goals:  Patient goals set: 1. Patient has history of decreased appetite and weight loss of about 14# over the past 2 months. Reviewed increasing meals times and eating small meals every 2-3 hours, goal is 6-8 small meals/day. 2. Reviewed high calorie/protein supplements- Ensure Complete/ Plus, Boost plus, ect. Reviewed using 3 times/day between meals to increase calorie/protein intake. Patient will go to PCP office today to  samples of Ensure. 3. Reviewed high calorie/protein foods and strategies to gain weight. Reviewed list of high calorie/protein foods and snacks that will be mailed to patient. Encouraged a protein source with each small meals- encourage meat, Greek yogurt, cheese, peanut butter, eggs, ect. We also discussed drinking beverages with calories- juices during the day besides water. Encouraged to include/offer at least 1 food item off high calorie/protien list at each small meal daily. Spoke with patient who did get  nutrition information and read through it. She also got Ensure coupons and is working on eating more often- snacking between meals. Messaged PCP office to get patient more samples of Ensure complete- she will  later today. Encouraged to use supplement twice daily between meals.  We also reviewed protein sources and

## 2023-09-01 ENCOUNTER — TELEPHONE (OUTPATIENT)
Dept: ONCOLOGY | Age: 53
End: 2023-09-01

## 2023-09-12 ENCOUNTER — CARE COORDINATION (OUTPATIENT)
Dept: CARE COORDINATION | Age: 53
End: 2023-09-12

## 2023-09-12 NOTE — CARE COORDINATION
Writer received referral for SDOH on 8/4/2023. The SDOH referral was submitted by PCP on 5/31/2023. Patient was referred to JOCY Sahni with ACM for financial resource strain, tobacco cessation, and food insecurity. Jeanc[facility] was unable to contact Patient. Writer submitted referral to Janes Purcell RD, LD, ACM for protein calorie malnutrition. Writer reviewed Patient's chart today in order to determine need for ACM with RN. Patient was a no show for her CT scan scheduled on 9/8/2023. It has not been rescheduled. Writer reviewed Dr. Diann Bueno note dated 8/28/2023. His plan of care is copied and pasted below for review with Patient:     Plan:      Strongly admonished her to quit smoking. All attempts at treating her lung disease are doomed to failure without this critical step. Yearly low-dose screening CT scan of the chest.  Discussed risks, benefits, and rationale and she accepts. Refilled all bronchodilators. Zithromax Z-Champ to have on hand for purulent exacerbation. Encouraged nutrition. Low BMI in this setting and ominous sign associated with poor short-term survival.  Encouraged flu shot, COVID vaccination, and RSV vaccines this fall. Encouraged pulmonary rehab program.  Return in 1 year. Phoned Patient to introduce self and explain ACM. Plan to enroll Patient in ACM if she is in agreement. Message left on Patient's identifiable voice mail requesting a return call. Message also states Patient missed her CT scan appointment. Writer's contact information provided.

## 2023-09-20 ENCOUNTER — CARE COORDINATION (OUTPATIENT)
Dept: CARE COORDINATION | Age: 53
End: 2023-09-20

## 2023-09-20 DIAGNOSIS — J43.9 PULMONARY EMPHYSEMA, UNSPECIFIED EMPHYSEMA TYPE (HCC): ICD-10-CM

## 2023-09-20 DIAGNOSIS — I10 HYPERTENSION, UNSPECIFIED TYPE: Primary | ICD-10-CM

## 2023-09-20 DIAGNOSIS — J45.909 ASTHMA, UNSPECIFIED ASTHMA SEVERITY, UNSPECIFIED WHETHER COMPLICATED, UNSPECIFIED WHETHER PERSISTENT: ICD-10-CM

## 2023-09-20 NOTE — CARE COORDINATION
Remote Patient Kit Ordering Note      Date/Time:  9/20/2023 1:50 PM      [x] CCSS confirmed patient shipping address  [x] Patient will receive package over the next 1-3 business days. Someone 21 years or older must be present to sign for UPS delivery. [x] HRS will contact patient within 24 hours, an [address] [place] [address] will call the patient directly: If the patient does not answer, HRS will follow up with the clinical team notifying them about the unsuccessful attempt to contact the patient. HRS will make three call attempts to the patient. Provide patient with [facility] Virtual install number is: [phone]. [x] ACM will contact patient once equipment is active to welcome them to the program.                                                         [] Hours of RPM monitoring - Monday-Friday 5699-1763; encourage patient to get vitals entered by [facility] each day to have the alert addressed same day. [x][facility] mailed R[facility] Patient flyer to patient. All questions answered at this time. ACM made aware the RPM kit has been ordered. [facility] notified patient of RPM equipment order.

## 2023-09-20 NOTE — CARE COORDINATION
for [facility] and [facility] to Patient. She is in agreement with for [facility]. AC enrollment initiated. Patient has a congested cough. She states her cough is productive. She states her sputum is clear at this time. She knows when she is developing an infection by the change in color of her sputum. Patient states she just woke up before this call and she coughs more in the morning. Patient has \"allergy asthma\". She doesn't rinse her mouth as recommended with inhaler use. She states she often has thrush due to this. Patient states she doesn't want the COVD vaccine. She has not had a COVID vaccine. She does not trust the vaccine. Patient does not agree to smoking cessation at this time. She states Chantix gives her bad dreams. The Nicoderm patch makes her itch. The Gum taste horrible. She states she is never taking Wellbutrin because she is not depressed. Patient's PCP and GI providers are both named Dr. Chanelle Marley. Patient states she missed her Peds GI appointment because she went to the PCP office in error. She will call to reschedule the GI appointment. Writer discussed Patient's needs with her:   She states she began her menses last week. She states she is past due for her PAP test.    Patient is past due for her mammogram.  [facility]  Her last mammogram was in 2021  Patient needs an eye doctor appointment. She attends [facility] in [place]  Patient declined Podiatry referral for plantar warts. Patient states she may have a Vitamin D deficiency. Review lab work  Patient is aware she has outstanding lab work from [facility]. She was informed lab orders are from December. Patient expressed interest in income based housing. She would like to move from the home that she currently rents. Patient currently receives food stamps. She receives disability.   Writer will request assistance of [facility] social work team.    Writer text Patient

## 2023-09-20 NOTE — PROGRESS NOTES
Remote Patient Monitoring Treatment Plan    Received request from ACM/BORIS Monroy RN  to order remote patient monitoring for in home monitoring of COPD, HTN, and Asthma and order completed. Patient will be monitoring   --blood pressure   --pulse ox   --weight Please set alert for ONLY weight gain of 5# in 7 days. Pt has no documented hx of HF.    --survey questions. Patient will engage in Remote Patient Monitoring each day to develop the skills necessary for self management. RPM Care Team Responsibilities:   Alerts will be reviewed daily and addressed within 2-4 hours during operational hours (Monday -Friday 9 am-4 pm)  Alert response and intervention documented in patient medical record  Alert response escalated to PCP per protocol and documented in patient medical record  Patient monitored over approximately  days  Discharge from program based on self-management readiness    See care coordination encounters for additional details.

## 2023-09-21 ENCOUNTER — CARE COORDINATION (OUTPATIENT)
Dept: CARE COORDINATION | Age: 53
End: 2023-09-21

## 2023-09-22 NOTE — CARE COORDINATION
Attempted to reach patient LVM with contact information. Will attempt to reach again within 1-2 weeks.   JESSICA Bashir

## 2023-09-25 ENCOUNTER — CARE COORDINATION (OUTPATIENT)
Dept: CARE COORDINATION | Age: 53
End: 2023-09-25

## 2023-09-25 NOTE — CARE COORDINATION
Nutrition Care Coordinator Follow-Up visit:    Food Recall:eating 3 meals/d    Activity Level:  Sedentary:X  Lightly Active: Moderately Active:  Very Active:    Adult BMI:  Underweight (below 18.5)X  Normal Weight (18.5-24.9)  Overweight (25-29. 9)  Obese (30-39. 9)  Morbidly Obese (>40)    Weight Change:up 2# over past 2 months    Plan:  Plan was established with patient:  Small frequent meals: X  Increase protein intake: X  Ensure plus 2-3 times/d: X    Monitoring: Will monitor weight:X  Will monitor adherence to meal plan: Will monitor adherence to exercise plan: Will monitor HGA1c:    Handouts Provided :  High protein foods: X    Goals:  Patient goals set: 1. Patient has history of decreased appetite and weight loss of about 14# over the past 2 months. Reviewed increasing meals times and eating small meals every 2-3 hours, goal is 6-8 small meals/day. 2. Reviewed high calorie/protein supplements- Ensure Complete/ Plus, Boost plus, ect. Reviewed using 3 times/day between meals to increase calorie/protein intake. Patient will go to PCP office today to  samples of Ensure. 3. Reviewed high calorie/protein foods and strategies to gain weight. Reviewed list of high calorie/protein foods and snacks that will be mailed to patient. Encouraged a protein source with each small meals- encourage meat, Greek yogurt, cheese, peanut butter, eggs, ect. We also discussed drinking beverages with calories- juices during the day besides water. Encouraged to include/offer at least 1 food item off high calorie/protien list at each small meal daily. Spoke with patient who relays she is doing well. She is working on eating more often- snacking between meals. Messaged PCP office again to get patient more samples of Ensure complete- she will  later today. Encouraged to use supplement twice daily between meals. We also reviewed protein sources and increasing at meals.  Will follow up in 3-4 weeks to review and

## 2023-09-27 ENCOUNTER — CARE COORDINATION (OUTPATIENT)
Dept: CARE COORDINATION | Age: 53
End: 2023-09-27

## 2023-09-27 NOTE — CARE COORDINATION
Ambulatory Care Coordination Note  9/27/2023      ACM attempted to contact the patient by telephone. Message left on voice mail requesting return call. Method of communication with provider: staff message. ACM: Mary Cooley RN    CC Plan:       Patient was a no show for her GI appointment on 9/15/23. It has not been rescheduled. She missed her appointment due to confusion with Patient's last name. 2.  Patient was a no show for her lung CT on 9/8/23. Patient was given the phone number for Central Scheduling. Test has not been rescheduled at this time. 3.  Review medications with Patient. 4.  Patient is past due for her PAP test.    5.  Patient is due for her Mammogram.  Her last Mammogram was in 2021.    6.  Patient needs an eye doctor appointment. She attends [facility] in [place]    7. Patient states she may have a Vitamin D deficiency. Review lab work    8. Patient is aware she has outstanding lab work from [email or web address]. She was informed lab orders are from December 9. ANANT Khalil, ALBERTINAW, was unable to reach Patient on 8/7/23. Patient expressed interest in income based housing. She would like to move from the home that she currently rents. Patient currently receives food stamps. She receives disability. Writer will request assistance of [facility]. 10.  Did Patient receive her RPM equipment? Offered patient enrollment in the Remote Patient Monitoring (RPM) program for in-home monitoring: Yes, patient already enrolled. Lab Results       None                 Goals Addressed    None         No future appointments.

## 2023-10-04 ENCOUNTER — CARE COORDINATION (OUTPATIENT)
Dept: CARE COORDINATION | Age: 53
End: 2023-10-04

## 2023-10-04 NOTE — CARE COORDINATION
Ambulatory Care Coordination Note  10/4/2023      ACM attempted to contact the patient by telephone. Message left on voice mail. Method of communication with provider: staff message. ACM: Eda Juarez, RN    CC Plan:        Patient was a no show for her GI appointment on 9/15/23. It has not been rescheduled. She missed her appointment due to confusion with Patient's last name. 2.  Patient was a no show for her lung CT on 9/8/23. Patient was given the phone number for Central Scheduling. Test has not been rescheduled at this time. 3.  Review medications with Patient. 4.  Patient is past due for her PAP test.     5.  Patient is due for her Mammogram.  Her last Mammogram was in 2021.     6.  Patient needs an eye doctor appointment. She attends [facility] in [place]     7. Patient states she may have a Vitamin D deficiency. Review lab work     8. Patient is aware she has outstanding lab work from [facility]. She was informed lab orders are from December 9. ANANT Lemus, JOCY, was unable to reach Patient on 8/7/23. Patient expressed interest in income based housing. She would like to move from the home that she currently rents. Patient currently receives food stamps. She receives disability. Writer will request assistance of [facility]. 10.  Did Patient receive her RPM equipment? Offered patient enrollment in the Remote Patient Monitoring (RPM) program for in-home monitoring: Yes, patient already enrolled. Phoned Patient for ACM update and to discuss cc plan of care. Message left on voice mail requesting return call. Contact information provided. Lab Results       None                 Goals Addressed    None         No future appointments.

## 2023-10-12 ENCOUNTER — CARE COORDINATION (OUTPATIENT)
Dept: CARE COORDINATION | Age: 53
End: 2023-10-12

## 2023-10-12 NOTE — CARE COORDINATION
Ambulatory Care Coordination Note  10/12/2023      ACM attempted to contact the patient by telephone. Message left on voice mail requesting return call. Method of communication with provider: staff message. ACM: Love Brennan RN    CC Plan:        Patient was a no show for her GI appointment on 9/15/23. It has not been rescheduled. She missed her appointment due to confusion with Patient's last name. 2.  Patient was a no show for her lung CT on 9/8/23. Patient was given the phone number for Central Scheduling. Test has not been rescheduled at this time. 3.  Review medications with Patient. 4.  Patient is past due for her PAP test.     5.  Patient is due for her Mammogram.  Her last Mammogram was in 2021.     6.  Patient needs an eye doctor appointment. She attends [facility] in [place]     7. Patient states she may have a Vitamin D deficiency. Review lab work     8. Patient is aware she has outstanding lab work from [employer]. She was informed lab orders are from December 9. ANANT Casper, LSW, was unable to reach Patient on 8/7/23. Patient expressed interest in income based housing. She would like to move from the home that she currently rents. Patient currently receives food stamps. She receives disability. Writer will request assistance of [facility]. 10.  Did Patient receive her RPM equipment? Offered patient enrollment in the Remote Patient Monitoring (RPM) program for in-home monitoring: Yes, patient already enrolled. Phoned Patient for ACM update and to discuss cc plan of care. Writer request return call from Patient. Writer last spoke with Patient on 9/20/2023. Patient has not returned Writer's last two calls. Writer will send letter to Patient. Lab Results       None                 Goals Addressed    None         No future appointments.

## 2023-10-16 ENCOUNTER — TELEPHONE (OUTPATIENT)
Dept: FAMILY MEDICINE CLINIC | Age: 53
End: 2023-10-16

## 2023-10-17 ENCOUNTER — TELEPHONE (OUTPATIENT)
Dept: FAMILY MEDICINE CLINIC | Age: 53
End: 2023-10-17

## 2023-10-18 ENCOUNTER — CARE COORDINATION (OUTPATIENT)
Dept: CARE COORDINATION | Age: 53
End: 2023-10-18

## 2023-10-19 DIAGNOSIS — I10 ESSENTIAL HYPERTENSION: ICD-10-CM

## 2023-10-19 RX ORDER — HYDROCHLOROTHIAZIDE 25 MG/1
25 TABLET ORAL DAILY
Qty: 30 TABLET | Refills: 2 | Status: SHIPPED | OUTPATIENT
Start: 2023-10-19

## 2023-10-20 ENCOUNTER — CARE COORDINATION (OUTPATIENT)
Dept: CARE COORDINATION | Age: 53
End: 2023-10-20

## 2023-10-20 DIAGNOSIS — J45.909 ASTHMA, UNSPECIFIED ASTHMA SEVERITY, UNSPECIFIED WHETHER COMPLICATED, UNSPECIFIED WHETHER PERSISTENT: Primary | ICD-10-CM

## 2023-10-20 DIAGNOSIS — I10 PRIMARY HYPERTENSION: ICD-10-CM

## 2023-10-20 DIAGNOSIS — J43.9 PULMONARY EMPHYSEMA, UNSPECIFIED EMPHYSEMA TYPE (HCC): ICD-10-CM

## 2023-10-20 NOTE — CARE COORDINATION
Ambulatory Care Coordination Note  10/20/2023      ACM attempted to contact the patient by telephone. Message left on identifiable voice mail requesting return call. Method of communication with provider: staff message. ACM: Christopher Kent RN    CC Plan:        Patient was a no show for her GI appointment on 9/15/23. It has not been rescheduled. She missed her appointment due to confusion with Patient's last name. 2.  Patient was a no show for her lung CT on 9/8/23. Patient was given the phone number for Central Scheduling. Test has not been rescheduled at this time. 3.  Review medications with Patient. 4.  Patient is past due for her PAP test.     5.  Patient is due for her Mammogram.  Her last Mammogram was in 2021.     6.  Patient needs an eye doctor appointment. She attends [facility] in [place]     7. Patient states she may have a Vitamin D deficiency. Review lab work     8. Patient is aware she has outstanding lab work from [facility]. She was informed lab orders are from December 9. Did Patient receive her RPM equipment? Offered patient enrollment in the Remote Patient Monitoring (RPM) program for in-home monitoring: Yes, patient already enrolled. Phoned Patient for AC update and to discuss cc plan of care. Message left on Patient's identifiable voice mail requesting return call. Writer's contact information provided. Writer last spoke with Patient on 9/20/2023. [facility] discharge letter mailed to Patient due to unable to reach Patient.     Lab Results       None                 Goals Addressed    None         Future Appointments   Date Time Provider [address]   11/9/2023  1:30 PM Rober Jacob, [address]

## 2023-10-20 NOTE — PROGRESS NOTES
Remote Patient Order Discontinued    Received request from Whti Negrete RN  to discontinue order for remote patient monitoring of COPD, HTN, and Asthma and order completed.

## 2023-10-20 NOTE — CARE COORDINATION
CCSS placed call to patient to arrange RPM kit  through [facility]. Left detailed vm about  and how to package equipment. Reviewed with patient how to pack equipment in original packing. Verified patient's availability to schedule UPS  time. UPS  time requested.  Anticipated  date range Approx 2-4 business days

## 2023-10-23 DIAGNOSIS — Z76.0 MEDICATION REFILL: ICD-10-CM

## 2023-10-23 RX ORDER — CETIRIZINE HYDROCHLORIDE 10 MG/1
10 TABLET ORAL DAILY
Qty: 30 TABLET | Refills: 3 | Status: SHIPPED | OUTPATIENT
Start: 2023-10-23

## 2023-10-23 NOTE — TELEPHONE ENCOUNTER
Last visit: 5/31/23  Last Med refill: 4/27/23  Does patient have enough medication for 72 hours: No:     Next Visit Date:  Future Appointments   Date Time Provider [address]   11/9/2023  1:30 PM Meryl Corcoran MD [address] [place] [address],[place] [address] Maintenance   Topic Date Due    Hepatitis B vaccine (1 of 3 - 3-dose series) Never done    COVID-19 Vaccine (1) Never done    Cervical cancer screen  06/20/2017    Pneumococcal 0-64 years Vaccine (2 - PCV) 03/17/2018    Shingles vaccine (1 of 2) Never done    Low dose CT lung screening &/or counseling  Never done    DTaP/Tdap/Td vaccine (2 - Td or Tdap) 01/11/2023    Flu vaccine (1) 08/01/2023    Breast cancer screen  10/14/2023    Depression Screen  05/31/2024    Lipids  08/24/2025    Colorectal Cancer Screen  05/08/2033    HIV screen  Completed    Hepatitis A vaccine  Aged Out    Hib vaccine  Aged Out    Meningococcal (ACWY) vaccine  Aged Out       Hemoglobin A1C (%)   Date Value   09/01/2021 4.3             ( goal A1C is < 7)   No components found for: \"LABMICR\"  LDL Cholesterol (mg/dL)   Date Value   08/24/2020 58   07/29/2015 59       (goal LDL is <100)   AST (U/L)   Date Value   05/05/2023 20     ALT (U/L)   Date Value   05/05/2023 18     BUN (mg/dL)   Date Value   05/08/2023 5 (L)     BP Readings from Last 3 Encounters:   08/28/23 108/76   06/12/23 (!) 135/90   05/31/23 130/80          (goal 120/80)    All Future Testing planned in [facility]  Lab Frequency Next Occurrence   AFP Tumor Marker Once 06/12/2023   Hepatic Function Panel Once 06/12/2023   CT Lung Screen (Initial/Annual/Baseline) Once 08/28/2023               Patient Active Problem List:     Asthma     COPD (chronic obstructive pulmonary disease) (HCC)     Hepatitis C     GERD (gastroesophageal reflux disease)     Hyperlipidemia     HTN (hypertension)     Allergic dermatitis     Rash     Vaginal discharge     Cervicitis     Viral gastroenteritis     Eosinophilic asthma     Gassiness     Unintentional

## 2023-10-30 ENCOUNTER — CARE COORDINATION (OUTPATIENT)
Dept: CARE COORDINATION | Age: 53
End: 2023-10-30

## 2023-11-09 ENCOUNTER — CARE COORDINATION (OUTPATIENT)
Dept: CARE COORDINATION | Age: 53
End: 2023-11-09

## 2023-11-09 NOTE — CARE COORDINATION
Attempted to reach patient LVM with contact information. Unable to reach patient. Removed from panel.   JESSICA Bashir

## 2024-01-10 DIAGNOSIS — I10 ESSENTIAL HYPERTENSION: ICD-10-CM

## 2024-01-11 RX ORDER — HYDROCHLOROTHIAZIDE 25 MG/1
25 TABLET ORAL DAILY
Qty: 90 TABLET | OUTPATIENT
Start: 2024-01-11

## 2024-01-18 DIAGNOSIS — I10 ESSENTIAL HYPERTENSION: ICD-10-CM

## 2024-01-18 RX ORDER — HYDROCHLOROTHIAZIDE 25 MG/1
25 TABLET ORAL DAILY
Qty: 90 TABLET | OUTPATIENT
Start: 2024-01-18

## 2024-02-20 DIAGNOSIS — Z76.0 MEDICATION REFILL: ICD-10-CM

## 2024-02-20 RX ORDER — CETIRIZINE HYDROCHLORIDE 10 MG/1
10 TABLET ORAL DAILY
Qty: 30 TABLET | Refills: 3 | Status: SHIPPED | OUTPATIENT
Start: 2024-02-20

## 2024-02-20 NOTE — TELEPHONE ENCOUNTER
Last visit: 05/31/2023  Last Med refill: 10/23/2023  Does patient have enough medication for 72 hours: No:     LVM for a return call to schedule for medication refills         Next Visit Date:  Future Appointments   Date Time Provider Department Center   8/19/2024  2:30 PM Maricruz Adams MD Resp Spec [id number]       Health Maintenance   Topic Date Due    Hepatitis B vaccine (1 of 3 - 3-dose series) Never done    COVID-19 Vaccine (1) Never done    Cervical cancer screen  06/20/2017    Pneumococcal 0-64 years Vaccine (2 - PCV) 03/17/2018    Shingles vaccine (1 of 2) Never done    Low dose CT lung screening &/or counseling  Never done    DTaP/Tdap/Td vaccine (2 - Td or Tdap) 01/11/2023    Flu vaccine (1) 08/01/2023    Breast cancer screen  10/14/2023    Depression Screen  05/31/2024    Lipids  08/24/2025    Colorectal Cancer Screen  05/08/2033    HIV screen  Completed    Hepatitis A vaccine  Aged Out    Hib vaccine  Aged Out    Polio vaccine  Aged Out    Meningococcal (ACWY) vaccine  Aged Out       Hemoglobin A1C (%)   Date Value   09/01/2021 4.3             ( goal A1C is < 7)   No components found for: \"LABMICR\"  LDL Cholesterol (mg/dL)   Date Value   08/24/2020 58   07/29/2015 59       (goal LDL is <100)   AST (U/L)   Date Value   05/05/2023 20     ALT (U/L)   Date Value   05/05/2023 18     BUN (mg/dL)   Date Value   05/08/2023 5 (L)     BP Readings from Last 3 Encounters:   08/28/23 108/76   06/12/23 (!) 135/90   05/31/23 130/80          (goal 120/80)    All Future Testing planned in [facility]  Lab Frequency Next Occurrence   AFP Tumor Marker Once 06/12/2023   Hepatic Function Panel Once 06/12/2023   CT Lung Screen (Initial/Annual/Baseline) Once 08/28/2023               Patient Active Problem List:     Asthma     COPD (chronic obstructive pulmonary disease) (HCC)     Hepatitis C     GERD (gastroesophageal reflux disease)     Hyperlipidemia     HTN (hypertension)     Allergic dermatitis     Rash     Vaginal

## 2024-03-07 ENCOUNTER — OFFICE VISIT (OUTPATIENT)
Dept: FAMILY MEDICINE CLINIC | Age: 54
End: 2024-03-07

## 2024-03-07 VITALS
DIASTOLIC BLOOD PRESSURE: 81 MMHG | SYSTOLIC BLOOD PRESSURE: 124 MMHG | WEIGHT: 98 LBS | BODY MASS INDEX: 14.9 KG/M2 | HEART RATE: 68 BPM | TEMPERATURE: 97.8 F

## 2024-03-07 DIAGNOSIS — J44.1 ACUTE EXACERBATION OF CHRONIC OBSTRUCTIVE PULMONARY DISEASE (COPD) (HCC): ICD-10-CM
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
## 2024-03-07 DIAGNOSIS — Z12.31 ENCOUNTER FOR SCREENING MAMMOGRAM FOR BREAST CANCER: ICD-10-CM

## 2024-03-07 DIAGNOSIS — Z87.891 PERSONAL HISTORY OF TOBACCO USE: ICD-10-CM

## 2024-03-07 DIAGNOSIS — J44.9 CHRONIC OBSTRUCTIVE PULMONARY DISEASE, UNSPECIFIED COPD TYPE (HCC): ICD-10-CM

## 2024-03-07 DIAGNOSIS — E43 SEVERE PROTEIN-CALORIE MALNUTRITION (HCC): ICD-10-CM

## 2024-03-07 RX ORDER — OMEPRAZOLE 20 MG/1
CAPSULE, DELAYED RELEASE ORAL
Qty: 30 CAPSULE | Refills: 3 | Status: SHIPPED | OUTPATIENT
Start: 2024-03-07

## 2024-03-07 RX ORDER — UMECLIDINIUM BROMIDE AND VILANTEROL TRIFENATATE 62.5; 25 UG/1; UG/1
1 POWDER RESPIRATORY (INHALATION) DAILY
Qty: 1 EACH | Refills: 2 | Status: SHIPPED | OUTPATIENT
Start: 2024-03-07

## 2024-03-14 NOTE — PROGRESS NOTES
Attending Physician Statement  I  have discussed the care of Sonia Mak including pertinent history and exam findings with the resident. I agree with the assessment, plan and orders as documented by the resident.      /81 (Site: Left Upper Arm, Position: Sitting, Cuff Size: Medium Adult)   Pulse 68   Temp 97.8 °F (36.6 °C) (Oral)   Wt 44.5 kg (98 lb)   LMP 08/31/2021   BMI 14.90 kg/m²    BP Readings from Last 3 Encounters:   03/07/24 124/81   08/28/23 108/76   06/12/23 (!) 135/90     Wt Readings from Last 3 Encounters:   03/07/24 44.5 kg (98 lb)   08/28/23 44.5 kg (98 lb)   06/12/23 43.9 kg (96 lb 12.8 oz)          Diagnosis Orders   1. Encounter for screening mammogram for breast cancer        2. Personal history of tobacco use        3. Unintentional weight loss        4. Acute exacerbation of chronic obstructive pulmonary disease (COPD) (HCC)          Due for preventative screening.  Following with pulmonology for COPD, gold stage III; will switch to LABA LAMA combination with further consideration for Trelegy if exacerbations arise  Discussed cessation of smoking  HEP-C s/p treatment   Nutrition referral in the future   Pulmonary rehab advised   Ensure drinks for protein supplementation, will order supplementation as well  Figure 8 splints for thumb support. May require OT in the future. No other joint complaints at this time.           Levi Godwin DO 3/7/2024 3:43 PM      
Patient was seen, need for thumb spica splints was assessed as patient has repetitive strain injury, does have pain on movement of thumbs bilaterally> left.  No tenderness, redness, or swelling noted.  Pain on passive and active motion of the thumbs.   Would benefit from thumb spica splints, to keep joint at rest.     Electronically signed by Yolanda Duff MD on 3/14/2024 at 12:08 PM   
Visit Information    Have you changed or started any medications since your last visit including any over-the-counter medicines, vitamins, or herbal medicines? no   Have you stopped taking any of your medications? Is so, why? -  no  Are you having any side effects from any of your medications? - no    Have you seen any other physician or provider since your last visit?  no   Have you had any other diagnostic tests since your last visit?  no   Have you been seen in the emergency room and/or had an admission in a hospital since we last saw you?  no   Have you had your routine dental cleaning in the past 6 months?  no     Do you have an active MyChart account? If no, what is the barrier?  Yes    Patient Care Team:  Yolanda Duff MD as PCP - General (Family Medicine)  Eddie Hudson DO as Consulting Physician (Pulmonology)  Cesar Hodges MD as Consulting Physician (Gastroenterology)    Medical History Review  Past Medical, Family, and Social History reviewed and does not contribute to the patient presenting condition    Health Maintenance   Topic Date Due    Hepatitis B vaccine (1 of 3 - 3-dose series) Never done    COVID-19 Vaccine (1) Never done    Cervical cancer screen  06/20/2017    Pneumococcal 0-64 years Vaccine (2 - PCV) 03/17/2018    Shingles vaccine (1 of 2) Never done    Low dose CT lung screening &/or counseling  Never done    DTaP/Tdap/Td vaccine (2 - Td or Tdap) 01/11/2023    Flu vaccine (1) 08/01/2023    Breast cancer screen  10/14/2023    Depression Screen  05/31/2024    Lipids  08/24/2025    Colorectal Cancer Screen  05/08/2033    HIV screen  Completed    Hepatitis A vaccine  Aged Out    Hib vaccine  Aged Out    Polio vaccine  Aged Out    Meningococcal (ACWY) vaccine  Aged Out             
screening in patients aged 50-77 with at least a 20 pack-year smoking history who currently smoke or have quit in the last 15 years

## 2024-03-28 DIAGNOSIS — I10 ESSENTIAL HYPERTENSION: ICD-10-CM

## 2024-03-28 RX ORDER — HYDROCHLOROTHIAZIDE 25 MG/1
25 TABLET ORAL DAILY
Qty: 90 TABLET | Refills: 2 | Status: SHIPPED | OUTPATIENT
Start: 2024-03-28

## 2024-03-28 NOTE — TELEPHONE ENCOUNTER
Last visit: 3/7/24  Last Med refill: 10/19/23  Does patient have enough medication for 72 hours: No:     Next Visit Date:  Future Appointments   Date Time Provider Department Center   8/19/2024  2:30 PM Maricruz Adams MD Resp Spec [id number]       Health Maintenance   Topic Date Due    Hepatitis B vaccine (1 of 3 - 3-dose series) Never done    COVID-19 Vaccine (1) Never done    Cervical cancer screen  06/20/2017    Pneumococcal 0-64 years Vaccine (2 of 2 - PCV) 03/17/2018    Shingles vaccine (1 of 2) Never done    Low dose CT lung screening &/or counseling  Never done    DTaP/Tdap/Td vaccine (2 - Td or Tdap) 01/11/2023    Flu vaccine (1) 08/01/2023    Breast cancer screen  10/14/2023    Depression Screen  03/07/2025    Lipids  08/24/2025    Colorectal Cancer Screen  05/08/2033    HIV screen  Completed    Hepatitis A vaccine  Aged Out    Hib vaccine  Aged Out    Polio vaccine  Aged Out    Meningococcal (ACWY) vaccine  Aged Out       Hemoglobin A1C (%)   Date Value   09/01/2021 4.3             ( goal A1C is < 7)   No components found for: \"LABMICR\"  LDL Cholesterol (mg/dL)   Date Value   08/24/2020 58   07/29/2015 59       (goal LDL is <100)   AST (U/L)   Date Value   05/05/2023 20     ALT (U/L)   Date Value   05/05/2023 18     BUN (mg/dL)   Date Value   05/08/2023 5 (L)     BP Readings from Last 3 Encounters:   03/07/24 124/81   08/28/23 108/76   06/12/23 (!) 135/90          (goal 120/80)    All Future Testing planned in [facility]  Lab Frequency Next Occurrence   AFP Tumor Marker Once 06/12/2023   Hepatic Function Panel Once 06/12/2023   CT Lung Screen (Initial/Annual/Baseline) Once 08/28/2023   TRACEY DIGITAL SCREEN W OR WO CAD BILATERAL Once 03/07/2024               Patient Active Problem List:     Asthma     COPD (chronic obstructive pulmonary disease) (HCC)     Hepatitis C     GERD (gastroesophageal reflux disease)     Hyperlipidemia     HTN (hypertension)     Allergic dermatitis     Rash     Vaginal discharge

## 2024-06-16 ENCOUNTER — HOSPITAL ENCOUNTER (EMERGENCY)
Age: 54
Discharge: HOME | End: 2024-06-16
Attending: EMERGENCY MEDICINE
Payer: COMMERCIAL

## 2024-06-16 ENCOUNTER — APPOINTMENT (OUTPATIENT)
Dept: GENERAL RADIOLOGY | Age: 54
End: 2024-06-16
Payer: COMMERCIAL

## 2024-06-16 VITALS
TEMPERATURE: 97.7 F | DIASTOLIC BLOOD PRESSURE: 74 MMHG | HEIGHT: 68 IN | SYSTOLIC BLOOD PRESSURE: 118 MMHG | HEART RATE: 83 BPM | RESPIRATION RATE: 19 BRPM | WEIGHT: 98 LBS | OXYGEN SATURATION: 99 % | BODY MASS INDEX: 14.85 KG/M2

## 2024-06-16 DIAGNOSIS — S90.31XA CONTUSION OF RIGHT FOOT, INITIAL ENCOUNTER: Primary | ICD-10-CM

## 2024-06-16 PROCEDURE — 73610 X-RAY EXAM OF ANKLE: CPT

## 2024-06-16 PROCEDURE — 6370000000 HC RX 637 (ALT 250 FOR IP): Performed by: STUDENT IN AN ORGANIZED HEALTH CARE EDUCATION/TRAINING PROGRAM

## 2024-06-16 PROCEDURE — 99283 EMERGENCY DEPT VISIT LOW MDM: CPT

## 2024-06-16 PROCEDURE — 73630 X-RAY EXAM OF FOOT: CPT

## 2024-06-16 RX ORDER — NAPROXEN 500 MG/1
500 TABLET ORAL 2 TIMES DAILY WITH MEALS
Qty: 60 TABLET | Refills: 0 | Status: SHIPPED | OUTPATIENT
Start: 2024-06-16

## 2024-06-16 RX ORDER — CYCLOBENZAPRINE HCL 10 MG
10 TABLET ORAL 3 TIMES DAILY PRN
Qty: 21 TABLET | Refills: 0 | Status: SHIPPED | OUTPATIENT
Start: 2024-06-16 | End: 2024-06-26

## 2024-06-16 RX ORDER — CYCLOBENZAPRINE HCL 10 MG
10 TABLET ORAL ONCE
Status: COMPLETED | OUTPATIENT
Start: 2024-06-16 | End: 2024-06-16

## 2024-06-16 RX ORDER — NAPROXEN 250 MG/1
500 TABLET ORAL ONCE
Status: COMPLETED | OUTPATIENT
Start: 2024-06-16 | End: 2024-06-16

## 2024-06-16 RX ADMIN — NAPROXEN 500 MG: 250 TABLET ORAL at 17:11

## 2024-06-16 RX ADMIN — CYCLOBENZAPRINE 10 MG: 10 TABLET, FILM COATED ORAL at 17:11

## 2024-06-16 ASSESSMENT — PAIN DESCRIPTION - ORIENTATION: ORIENTATION: RIGHT

## 2024-06-16 ASSESSMENT — PAIN SCALES - GENERAL: PAINLEVEL_OUTOF10: 8

## 2024-06-16 ASSESSMENT — LIFESTYLE VARIABLES
HOW MANY STANDARD DRINKS CONTAINING ALCOHOL DO YOU HAVE ON A TYPICAL DAY: PATIENT DOES NOT DRINK
HOW OFTEN DO YOU HAVE A DRINK CONTAINING ALCOHOL: NEVER

## 2024-06-16 ASSESSMENT — PAIN - FUNCTIONAL ASSESSMENT: PAIN_FUNCTIONAL_ASSESSMENT: 0-10

## 2024-06-16 ASSESSMENT — PAIN DESCRIPTION - LOCATION: LOCATION: FOOT
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
## 2024-07-15 ENCOUNTER — HOSPITAL ENCOUNTER (EMERGENCY)
Age: 54
Discharge: HOME | End: 2024-07-15
Attending: EMERGENCY MEDICINE
Payer: MEDICARE

## 2024-07-15 ENCOUNTER — APPOINTMENT (OUTPATIENT)
Dept: GENERAL RADIOLOGY | Age: 54
End: 2024-07-15
Payer: MEDICARE

## 2024-07-15 VITALS
SYSTOLIC BLOOD PRESSURE: 125 MMHG | WEIGHT: 98 LBS | DIASTOLIC BLOOD PRESSURE: 92 MMHG | TEMPERATURE: 97.2 F | RESPIRATION RATE: 18 BRPM | HEART RATE: 100 BPM | BODY MASS INDEX: 14.85 KG/M2 | HEIGHT: 68 IN | OXYGEN SATURATION: 97 %

## 2024-07-15 DIAGNOSIS — S30.0XXA HEMATOMA OF LEFT BUTTOCK: Primary | ICD-10-CM

## 2024-07-15 PROCEDURE — 6370000000 HC RX 637 (ALT 250 FOR IP)

## 2024-07-15 PROCEDURE — 99283 EMERGENCY DEPT VISIT LOW MDM: CPT

## 2024-07-15 PROCEDURE — 72190 X-RAY EXAM OF PELVIS: CPT

## 2024-07-15 RX ORDER — ACETAMINOPHEN 500 MG
500 TABLET ORAL EVERY 6 HOURS PRN
Qty: 20 TABLET | Refills: 0 | Status: SHIPPED | OUTPATIENT
Start: 2024-07-15 | End: 2024-07-20

## 2024-07-15 RX ORDER — IBUPROFEN 400 MG/1
400 TABLET ORAL EVERY 8 HOURS PRN
Qty: 15 TABLET | Refills: 0 | Status: SHIPPED | OUTPATIENT
Start: 2024-07-15 | End: 2024-07-20

## 2024-07-15 RX ORDER — IBUPROFEN 400 MG/1
400 TABLET ORAL ONCE
Status: COMPLETED | OUTPATIENT
Start: 2024-07-15 | End: 2024-07-15

## 2024-07-15 RX ORDER — ACETAMINOPHEN 325 MG/1
650 TABLET ORAL ONCE
Status: COMPLETED | OUTPATIENT
Start: 2024-07-15 | End: 2024-07-15

## 2024-07-15 RX ADMIN — ACETAMINOPHEN 650 MG: 325 TABLET ORAL at 16:09

## 2024-07-15 RX ADMIN — IBUPROFEN 400 MG: 400 TABLET, FILM COATED ORAL at 16:09

## 2024-07-15 NOTE — ED NOTES
Pt is A+Ox4  Pt states she fell today with  no loss of consciousness  Pt complains of a bump on her bottom  Pt denies LOC  Pt ambulates with a steady gait from triage to the mick  All questions answered and needs met at this time

## 2024-07-15 NOTE — DISCHARGE INSTRUCTIONS
You are seen here for left buttock pain after falling.    We have taken an x-ray, and found that you have no fracture at this time.  This is most likely hematoma, which is a collection of blood around the muscle like a bruise.    Please follow-up with your primary care provider.    You may return to the emergency department for any new or worsening symptoms.    Please take your medications as prescribed.  Have given you Tylenol Motrin if needed for pain.

## 2024-07-15 NOTE — ED PROVIDER NOTES
[facility] ED  Emergency Department Encounter  Emergency Medicine Resident     Pt Name:Sonia Mak  MRN: [id number]  Birthdate 1970  Date of evaluation: 7/15/24  PCP:  Yolanda Duff MD  Note Started: 3:57 PM EDT      CHIEF COMPLAINT       Chief Complaint   Patient presents with    Buttocks Pain       HISTORY OF PRESENT ILLNESS  (Location/Symptom, Timing/Onset, Context/Setting, Quality, Duration, Modifying Factors, Severity.)      Sonia Mak is a 53 y.o. female no significant pertinent medical history who presents with left buttock mass versus hematoma after falling backwards.    Patient states she was helping one of her family members move with a rollator walker, when the person fell back onto her, and she fell backwards onto the hard ground.    She does not have any significant tenderness to the bony areas of the left hip, however she does have a area of swelling of the left buttock.    She has no numbness or tingling distal to the injury, has full range of motion without pain, has no other complaints at this time.  Did not hit her head.  Did not hit any other part of her body.    Will evaluate the patient at this time with x-rays for fracture.  Otherwise, the patient should be stable to discharge.    PAST MEDICAL / SURGICAL / SOCIAL / FAMILY HISTORY      has a past medical history of Allergic rhinitis, Asthma, COPD (chronic obstructive pulmonary disease) (HCC), GERD (gastroesophageal reflux disease), Hepatitis C, and Hyperlipidemia.       has a past surgical history that includes Esophagogastroduodenoscopy (05/08/2023); Upper gastrointestinal endoscopy (N/A, 5/8/2023); and Colonoscopy (N/A, 5/8/2023).      Social History     Socioeconomic History    Marital status: Single     Spouse name: Not on file    Number of children: Not on file    Years of education: Not on file    Highest education level: Not on file   Occupational History    Not on file   Tobacco Use

## 2024-07-15 NOTE — ED PROVIDER NOTES
[facility]     Emergency Department     Faculty Attestation    I performed a history and physical examination of the patient and discussed management with the resident. I reviewed the resident´s note and agree with the documented findings and plan of care. Any areas of disagreement are noted on the chart. I was personally present for the key portions of any procedures. I have documented in the chart those procedures where I was not present during the key portions. I have reviewed the emergency nurses triage note. I agree with the chief complaint, past medical history, past surgical history, allergies, medications, social and family history as documented unless otherwise noted below. For Physician Assistant/ Nurse Practitioner cases/documentation I have personally evaluated this patient and have completed at least one if not all key elements of the E/M (history, physical exam, and MDM). Additional findings are as noted.    Golf ball sized hematoma right buttocks near the midline, pelvis stable.     Lev Pratt MD  07/15/24 [id number]

## 2024-08-08 ENCOUNTER — OFFICE VISIT (OUTPATIENT)
Dept: FAMILY MEDICINE CLINIC | Age: 54
End: 2024-08-08
Payer: MEDICARE

## 2024-08-08 VITALS
WEIGHT: 91.8 LBS | SYSTOLIC BLOOD PRESSURE: 133 MMHG | HEIGHT: 68 IN | HEART RATE: 74 BPM | DIASTOLIC BLOOD PRESSURE: 87 MMHG | BODY MASS INDEX: 13.91 KG/M2

## 2024-08-08 DIAGNOSIS — I10 PRIMARY HYPERTENSION: Primary | ICD-10-CM

## 2024-08-08 DIAGNOSIS — R63.6 UNDERWEIGHT: ICD-10-CM

## 2024-08-08 DIAGNOSIS — J44.9 CHRONIC OBSTRUCTIVE PULMONARY DISEASE, UNSPECIFIED COPD TYPE (HCC): ICD-10-CM

## 2024-08-08 DIAGNOSIS — R05.3 CHRONIC COUGH: ICD-10-CM

## 2024-08-08 DIAGNOSIS — M79.18 MUSCULOSKELETAL PAIN: ICD-10-CM

## 2024-08-08 PROCEDURE — 99213 OFFICE O/P EST LOW 20 MIN: CPT

## 2024-08-08 PROCEDURE — 3079F DIAST BP 80-89 MM HG: CPT

## 2024-08-08 PROCEDURE — 3075F SYST BP GE 130 - 139MM HG: CPT

## 2024-08-08 PROCEDURE — G8419 CALC BMI OUT NRM PARAM NOF/U: HCPCS

## 2024-08-08 PROCEDURE — 4004F PT TOBACCO SCREEN RCVD TLK: CPT

## 2024-08-08 PROCEDURE — 3017F COLORECTAL CA SCREEN DOC REV: CPT

## 2024-08-08 PROCEDURE — G8427 DOCREV CUR MEDS BY ELIG CLIN: HCPCS

## 2024-08-08 PROCEDURE — 3023F SPIROM DOC REV: CPT

## 2024-08-08 RX ORDER — BENZONATATE 100 MG/1
100-200 CAPSULE ORAL 3 TIMES DAILY PRN
Qty: 60 CAPSULE | Refills: 0 | Status: SHIPPED | OUTPATIENT
Start: 2024-08-08 | End: 2024-08-18

## 2024-08-08 RX ORDER — LACTOSE-REDUCED FOOD
1 LIQUID (ML) ORAL 2 TIMES DAILY
Qty: 60 EACH | Refills: 2 | Status: SHIPPED | OUTPATIENT
Start: 2024-08-08

## 2024-08-08 SDOH — ECONOMIC STABILITY: FOOD INSECURITY: WITHIN THE PAST 12 MONTHS, YOU WORRIED THAT YOUR FOOD WOULD RUN OUT BEFORE YOU GOT MONEY TO BUY MORE.: SOMETIMES TRUE

## 2024-08-08 SDOH — ECONOMIC STABILITY: FOOD INSECURITY: WITHIN THE PAST 12 MONTHS, THE FOOD YOU BOUGHT JUST DIDN'T LAST AND YOU DIDN'T HAVE MONEY TO GET MORE.: SOMETIMES TRUE

## 2024-08-08 SDOH — ECONOMIC STABILITY: INCOME INSECURITY: HOW HARD IS IT FOR YOU TO PAY FOR THE VERY BASICS LIKE FOOD, HOUSING, MEDICAL CARE, AND HEATING?: VERY HARD

## 2024-08-08 ASSESSMENT — ENCOUNTER SYMPTOMS
CHOKING: 0
COUGH: 1
GASTROINTESTINAL NEGATIVE: 1
SHORTNESS OF BREATH: 0
CHEST TIGHTNESS: 0
WHEEZING: 0

## 2024-08-08 ASSESSMENT — PATIENT HEALTH QUESTIONNAIRE - PHQ9
SUM OF ALL RESPONSES TO PHQ QUESTIONS 1-9: 0
SUM OF ALL RESPONSES TO PHQ QUESTIONS 1-9: 0
1. LITTLE INTEREST OR PLEASURE IN DOING THINGS: NOT AT ALL
SUM OF ALL RESPONSES TO PHQ QUESTIONS 1-9: 0
SUM OF ALL RESPONSES TO PHQ QUESTIONS 1-9: 0
SUM OF ALL RESPONSES TO PHQ9 QUESTIONS 1 & 2: 0
2. FEELING DOWN, DEPRESSED OR HOPELESS: NOT AT ALL

## 2024-08-08 NOTE — PROGRESS NOTES
Visit Information    Have you changed or started any medications since your last visit including any over-the-counter medicines, vitamins, or herbal medicines? no   Are you having any side effects from any of your medications? -  no  Have you stopped taking any of your medications? Is so, why? -  no    Have you seen any other physician or provider since your last visit? no  Have you had any other diagnostic tests since your last visit? Yes - Records Obtained  Have you been seen in the emergency room and/or had an admission to a hospital since we last saw you? Yes - Records Obtained  Have you had your routine dental cleaning in the past 6 months? no    Have you activated your [email or web address] account? If not, what are your barriers? Yes     Patient Care Team:  Yolanda Duff MD as PCP - General (Family Medicine)  Eddie Hudson DO as Consulting Physician (Pulmonology)  Cesar Hodges MD as Consulting Physician (Gastroenterology)    Medical History Review  Past Medical, Family, and Social History reviewed and does not contribute to the patient presenting condition    Health Maintenance   Topic Date Due    Hepatitis B vaccine (1 of 3 - 3-dose series) Never done    COVID-19 Vaccine (1) Never done    Cervical cancer screen  06/20/2017    Pneumococcal 0-64 years Vaccine (2 of 2 - PCV) 03/17/2018    Shingles vaccine (1 of 2) Never done    Lung Cancer Screening &/or Counseling  Never done    DTaP/Tdap/Td vaccine (2 - Td or Tdap) 01/11/2023    Breast cancer screen  10/14/2023    Annual Wellness Visit (Medicare Advantage)  Never done    Flu vaccine (1) 08/01/2024    Depression Screen  03/07/2025    Lipids  08/24/2025    Colorectal Cancer Screen  05/08/2033    HIV screen  Completed    Hepatitis A vaccine  Aged Out    Hib vaccine  Aged Out    Polio vaccine  Aged Out    Meningococcal (ACWY) vaccine  Aged Out

## 2024-08-08 NOTE — PATIENT INSTRUCTIONS
they offer: Referral to transportation and other resources for seniors.  Phone Number: 861.630.9374  TRIPS Public Transport Demand-Response Service  [facility]  What they offer: Low-cost transportation service. Pickups must originate in [facility]. Wheelchair accessibility and special fare rates for seniors 60+.  Phone Number: [phone] dispatch open 8am - 4pm, advance notice preferred.  [employer].:  What they offer: Senior Ride Programs  Phone Number: (766) 968-2087    Website:   [employer]:  What they offer: Transportation  Phone Number: 529.338.7882  [facility] Action Partnership (GLCAP):   Marva Grewal [place], Danelle Sinclair, [place], [place], [place], [place]uyler  counvioleta  What they offer: referral to transportation and other resources.  Phone Number: [phone]   [facility] Commission on Aging  783.334.7285 or 490-336-1457[place] Food Resources*  (Call [facility] for more resources)        [facility] [employer] [facility]  What they offer: Food pantry second and fourth Tuesday 4:30-6pm  Phone Number and Address: 670.341.3687 Toll Free: [employer], between [employer]; [address] Office on Aging of [facility]  What they offer: “Meals & Nutrition” search option on website  Phone Number and Website: 993.800.2731; [email or web address]://[employer]/  [facility]  What they offer: Dinner is open to all (must be registered and in good standing) and three hot meals a day for shelter residents. Breakfast 7-8am, Lunch 12-1pm, and Dinner 5-6pm daily.  Phone and Address: 785.321.1707; [address] [id number]  [employer] Last Mile Delivery program  What they offer: Food Box Delivery for those within [facility] who are homebound or without transportation. Applications done by phone. Qualifying individuals are eligible for 3 deliveries for the lifetime of the
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
## 2024-08-08 NOTE — PROGRESS NOTES
Attending Physician Statement  I have discussed the care of Sonia Mak, including pertinent history and exam findings,  with the resident. I have reviewed the key elements of all parts of the encounter with the resident.  I agree with the assessment, plan and orders as documented by the resident.  (GE Modifier)    Akosua Blackman MD

## 2024-09-04 ENCOUNTER — TELEPHONE (OUTPATIENT)
Dept: SURGERY | Age: 54
End: 2024-09-04

## 2024-09-04 NOTE — TELEPHONE ENCOUNTER
LVM for a return call to reschedule patient's appointment from 09/05/2024 due to provider not in clinic.

## 2024-09-06 ENCOUNTER — TELEPHONE (OUTPATIENT)
Dept: FAMILY MEDICINE CLINIC | Age: 54
End: 2024-09-06

## 2024-09-12 DIAGNOSIS — Z76.0 MEDICATION REFILL: ICD-10-CM

## 2024-09-18 RX ORDER — CETIRIZINE HYDROCHLORIDE 10 MG/1
10 TABLET ORAL DAILY
Qty: 30 TABLET | Refills: 3 | Status: SHIPPED | OUTPATIENT
Start: 2024-09-18

## 2024-09-20 DIAGNOSIS — J44.9 STAGE 3 SEVERE COPD BY GOLD CLASSIFICATION (HCC): ICD-10-CM

## 2024-09-20 DIAGNOSIS — E43 SEVERE PROTEIN-CALORIE MALNUTRITION (HCC): ICD-10-CM

## 2024-09-20 DIAGNOSIS — I10 ESSENTIAL HYPERTENSION: ICD-10-CM

## 2024-09-23 RX ORDER — HYDROCHLOROTHIAZIDE 25 MG/1
25 TABLET ORAL DAILY
Qty: 90 TABLET | Refills: 0 | Status: SHIPPED | OUTPATIENT
Start: 2024-09-23

## 2024-09-23 RX ORDER — ALBUTEROL SULFATE 90 UG/1
2 INHALANT RESPIRATORY (INHALATION) EVERY 6 HOURS PRN
Qty: 18 G | Refills: 0 | Status: SHIPPED | OUTPATIENT
Start: 2024-09-23

## 2024-10-23 ENCOUNTER — TELEPHONE (OUTPATIENT)
Dept: FAMILY MEDICINE CLINIC | Age: 54
End: 2024-10-23

## 2024-10-31 DIAGNOSIS — J44.9 STAGE 3 SEVERE COPD BY GOLD CLASSIFICATION (HCC): Primary | ICD-10-CM

## 2024-11-22 DIAGNOSIS — E43 SEVERE PROTEIN-CALORIE MALNUTRITION (HCC): ICD-10-CM

## 2024-11-22 DIAGNOSIS — Z76.0 MEDICATION REFILL: ICD-10-CM

## 2024-11-22 RX ORDER — CETIRIZINE HYDROCHLORIDE 10 MG/1
10 TABLET ORAL DAILY
Qty: 30 TABLET | Refills: 3 | Status: SHIPPED | OUTPATIENT
Start: 2024-11-22

## 2024-12-02 ENCOUNTER — OFFICE VISIT (OUTPATIENT)
Dept: FAMILY MEDICINE CLINIC | Age: 54
End: 2024-12-02
Payer: MEDICARE

## 2024-12-02 VITALS
HEART RATE: 95 BPM | BODY MASS INDEX: 14.31 KG/M2 | DIASTOLIC BLOOD PRESSURE: 79 MMHG | HEIGHT: 68 IN | SYSTOLIC BLOOD PRESSURE: 111 MMHG | WEIGHT: 94.4 LBS

## 2024-12-02 DIAGNOSIS — K57.90 DIVERTICULOSIS: ICD-10-CM

## 2024-12-02 DIAGNOSIS — J44.9 CHRONIC OBSTRUCTIVE PULMONARY DISEASE, UNSPECIFIED COPD TYPE (HCC): Primary | ICD-10-CM

## 2024-12-02 DIAGNOSIS — Z12.31 ENCOUNTER FOR SCREENING MAMMOGRAM FOR BREAST CANCER: ICD-10-CM

## 2024-12-02 DIAGNOSIS — Z87.891 PERSONAL HISTORY OF TOBACCO USE: ICD-10-CM

## 2024-12-02 PROBLEM — J45.50 POORLY CONTROLLED SEVERE PERSISTENT ASTHMA WITHOUT COMPLICATION: Status: RESOLVED | Noted: 2021-08-31 | Resolved: 2024-12-02

## 2024-12-02 PROCEDURE — G8427 DOCREV CUR MEDS BY ELIG CLIN: HCPCS

## 2024-12-02 PROCEDURE — G0296 VISIT TO DETERM LDCT ELIG: HCPCS

## 2024-12-02 PROCEDURE — G8484 FLU IMMUNIZE NO ADMIN: HCPCS

## 2024-12-02 PROCEDURE — 3074F SYST BP LT 130 MM HG: CPT

## 2024-12-02 PROCEDURE — G8419 CALC BMI OUT NRM PARAM NOF/U: HCPCS

## 2024-12-02 PROCEDURE — 3017F COLORECTAL CA SCREEN DOC REV: CPT

## 2024-12-02 PROCEDURE — 3023F SPIROM DOC REV: CPT

## 2024-12-02 PROCEDURE — 4004F PT TOBACCO SCREEN RCVD TLK: CPT

## 2024-12-02 PROCEDURE — 3078F DIAST BP <80 MM HG: CPT

## 2024-12-02 PROCEDURE — 99213 OFFICE O/P EST LOW 20 MIN: CPT

## 2024-12-02 ASSESSMENT — PATIENT HEALTH QUESTIONNAIRE - PHQ9
SUM OF ALL RESPONSES TO PHQ QUESTIONS 1-9: 0
SUM OF ALL RESPONSES TO PHQ9 QUESTIONS 1 & 2: 0
2. FEELING DOWN, DEPRESSED OR HOPELESS: NOT AT ALL
SUM OF ALL RESPONSES TO PHQ QUESTIONS 1-9: 0
1. LITTLE INTEREST OR PLEASURE IN DOING THINGS: NOT AT ALL

## 2024-12-02 NOTE — PATIENT INSTRUCTIONS
radiation, or chemotherapy--that you don't need.  There is a risk of damage to cells or tissue from being exposed to radiation, including the small amounts used in CTs, X-rays, and other medical tests. Over time, exposure to radiation may cause cancer and other health problems. But in most cases, the risk of getting cancer from being exposed to small amounts of radiation is low. It's not a reason to avoid these tests for most people.  What are the benefits of screening?  Your scan may be normal (negative).  For some people who are at higher risk, screening lowers the chance of dying of lung cancer. How much and how long you smoked helps to determine your risk level. Screening can find some cancers early, when treatment may be more likely to work.  What happens after screening?  The results of your CT scan will be sent to your doctor. Someone from your care team will explain the results of your scan and answer any questions you may have. If you need any follow-up, he or she will help you understand what to do next.  After a lung cancer screening, you can go back to your usual activities right away.  A lung cancer screening test can't tell if you have lung cancer. If your results are positive, your doctor can't tell whether an abnormal finding is a harmless nodule, cancer, or something else without doing more tests.  What can you do to help prevent lung cancer?  Some lung cancers can't be prevented. But if you smoke, quitting smoking is the best step you can take to prevent lung cancer. If you want to quit, your doctor can recommend medicines or other ways to help.  Follow-up care is a key part of your treatment and safety. Be sure to make and go to all appointments, and call your doctor if you are having problems. It's also a good idea to know your test results and keep a list of the medicines you take.  Where can you learn more?  Go to https://www.healthwise.net/patientEd and enter Q940 to learn more about \"Learning

## 2024-12-02 NOTE — PROGRESS NOTES
Attending Physician Statement  I  have discussed the care of Sonia Mak including pertinent history and exam findings with the resident. I agree with the assessment, plan and orders as documented by the resident.      /79 (Site: Left Upper Arm, Position: Sitting, Cuff Size: Medium Adult)   Pulse 95   Ht 1.727 m (5' 7.99\")   Wt 42.8 kg (94 lb 6.4 oz)   LMP 08/31/2021   BMI 14.36 kg/m²    BP Readings from Last 3 Encounters:   12/02/24 111/79   08/08/24 133/87   07/15/24 (!) 125/92     Wt Readings from Last 3 Encounters:   12/02/24 42.8 kg (94 lb 6.4 oz)   08/08/24 41.6 kg (91 lb 12.8 oz)   07/15/24 44.5 kg (98 lb)          Diagnosis Orders   1. Chronic obstructive pulmonary disease, unspecified COPD type (HCC)        2. Encounter for screening mammogram for breast cancer  TRACEY Digital Screen Bilateral      3. Personal history of tobacco use  CT VISIT TO DISCUSS LUNG CA SCREEN W LDCT    CT Lung Screen (Initial/Annual/Baseline)      4. Diverticulosis  Anni Vela MD, Gastroenterology, [facility]              Samuel Arrieta MD 12/2/2024 4:30 PM      
  /79 (Site: Left Upper Arm, Position: Sitting, Cuff Size: Medium Adult)   Pulse 95   Ht 1.727 m (5' 7.99\")   Wt 42.8 kg (94 lb 6.4 oz)   LMP 08/31/2021   BMI 14.36 kg/m²    BP Readings from Last 3 Encounters:   12/02/24 111/79   08/08/24 133/87   07/15/24 (!) 125/92       Physical Exam  Constitutional:       Appearance: Normal appearance.   Cardiovascular:      Rate and Rhythm: Normal rate and regular rhythm.      Pulses: Normal pulses.      Heart sounds: Normal heart sounds.   Pulmonary:      Effort: Pulmonary effort is normal. No respiratory distress.      Breath sounds: No wheezing or rales.   Musculoskeletal:         General: Tenderness present.   Skin:     Capillary Refill: Capillary refill takes less than 2 seconds.   Neurological:      Mental Status: She is alert.         Lab Results   Component Value Date    WBC 4.7 05/08/2023    HGB 10.7 (L) 05/08/2023    HCT 33.3 (L) 05/08/2023     05/08/2023    CHOL 235 (H) 08/24/2020    TRIG 91 08/24/2020     08/24/2020    ALT 18 05/05/2023    AST 20 05/05/2023     05/08/2023    K 3.3 (L) 05/08/2023     05/08/2023    CREATININE 0.45 (L) 05/08/2023    BUN 5 (L) 05/08/2023    CO2 25 05/08/2023    TSH 0.75 09/01/2021    INR 1.0 05/05/2023    LABA1C 4.3 09/01/2021     Lab Results   Component Value Date    CALCIUM 8.9 05/08/2023    PHOS 3.4 08/29/2011     No results found for: \"LDLDIRECT\"    Assessment and Plan:    1. Chronic obstructive pulmonary disease, unspecified COPD type (HCC)  - Did talk to patient about need for steroids and Z-Champ in case that her need for rescue inhaler increases or she starts to have a purulent cough.  -Patient doing well on Stiolto and Symbicort.    2. Encounter for screening mammogram for breast cancer  - TRACEY Digital Screen Bilateral; Future    3. Personal history of tobacco use  -Patient states that she has quit in the past, is not ready to try to quit right now.  - IA VISIT TO DISCUSS LUNG CA SCREEN W 
the patient the current USPSTF guidelines released March 9, 2021 for screening for lung cancer.    For adults aged 50 to 80 years who have a 20 pack-year smoking history and currently smoke or have quit within the past 15 years the grade B recommendation is to:  Screen for lung cancer with low-dose computed tomography (LDCT) every year.  Stop screening once a person has not smoked for 15 years or has a health problem that limits life expectancy or the ability to have lung surgery.    The patient  reports that she has been smoking cigarettes. She started smoking about 39 years ago. She has a 39.9 pack-year smoking history. She has never used smokeless tobacco.. Discussed with patient the risks and benefits of screening, including over-diagnosis, false positive rate, and total radiation exposure.  The patient currently exhibits no signs or symptoms suggestive of lung cancer.  Discussed with patient the importance of compliance with yearly annual lung cancer screenings and willingness to undergo diagnosis and treatment if screening scan is positive.  In addition, the patient was counseled regarding the importance of remaining smoke free and/or total smoking cessation.    Also reviewed the following if the patient has Medicare that as of February 10, 2022, Medicare only covers LDCT screening in patients aged 50-77 with at least a 20 pack-year smoking history who currently smoke or have quit in the last 15 years

## 2024-12-03 ENCOUNTER — TELEPHONE (OUTPATIENT)
Dept: GASTROENTEROLOGY | Age: 54
End: 2024-12-03

## 2024-12-03 NOTE — TELEPHONE ENCOUNTER
Attempt 1, writer spoke with pt to schedule new pt appt/[facility]. Pt prefers [facility] location and pt would like to wait until January to schedule.    New Pt GI Referral from PCP  Dx: Diverticulosis

## 2025-01-01 PROBLEM — Z12.31 ENCOUNTER FOR SCREENING MAMMOGRAM FOR BREAST CANCER: Status: RESOLVED | Noted: 2024-12-02 | Resolved: 2025-01-01

## 2025-01-20 DIAGNOSIS — J44.9 STAGE 3 SEVERE COPD BY GOLD CLASSIFICATION (HCC): ICD-10-CM

## 2025-01-20 NOTE — TELEPHONE ENCOUNTER
Last visit: 12/2/24  Last Med refill: 9/23/24  Does patient have enough medication for 72 hours: no    Next Visit Date:  No future appointments.  Patient also wants a z-arleth called in too.     Health Maintenance   Topic Date Due    Hepatitis B vaccine (1 of 3 - 19+ 3-dose series) Never done    Cervical cancer screen  06/20/2017    Pneumococcal 0-64 years Vaccine (2 of 2 - PCV) 03/17/2018    Shingles vaccine (1 of 2) Never done    Lung Cancer Screening &/or Counseling  Never done    DTaP/Tdap/Td vaccine (2 - Td or Tdap) 01/11/2023    Breast cancer screen  10/14/2023    Flu vaccine (1) 08/01/2024    COVID-19 Vaccine (1 - 2023-24 season) Never done    Annual Wellness Visit (Medicare Advantage)  Never done    Lipids  08/24/2025    Depression Screen  12/02/2025    Colorectal Cancer Screen  05/08/2033    HIV screen  Completed    Hepatitis A vaccine  Aged Out    Hib vaccine  Aged Out    Polio vaccine  Aged Out    Meningococcal (ACWY) vaccine  Aged Out       Hemoglobin A1C (%)   Date Value   09/01/2021 4.3             ( goal A1C is < 7)   No components found for: \"LABMICR\"  No components found for: \"LDLCHOLESTEROL\", \"LDLCALC\"    (goal LDL is <100)   AST (U/L)   Date Value   05/05/2023 20     ALT (U/L)   Date Value   05/05/2023 18     BUN (mg/dL)   Date Value   05/08/2023 5 (L)     BP Readings from Last 3 Encounters:   12/02/24 111/79   08/08/24 133/87   07/15/24 (!) 125/92          (goal 120/80)    All Future Testing planned in [facility]  Lab Frequency Next Occurrence   TRACEY Digital Screen Bilateral Once 12/02/2024   CT Lung Screen (Initial/Annual/Baseline) Once 12/02/2024               Patient Active Problem List:     Asthma     COPD (chronic obstructive pulmonary disease) (HCC)     Hepatitis C     GERD (gastroesophageal reflux disease)     Hyperlipidemia     HTN (hypertension)     Allergic dermatitis     Rash     Vaginal discharge     Cervicitis     Viral gastroenteritis     Eosinophilic asthma     Gassiness

## 2025-01-21 ENCOUNTER — TELEPHONE (OUTPATIENT)
Dept: FAMILY MEDICINE CLINIC | Age: 55
End: 2025-01-21

## 2025-01-21 DIAGNOSIS — J44.1 ACUTE EXACERBATION OF CHRONIC OBSTRUCTIVE PULMONARY DISEASE (COPD) (HCC): Primary | ICD-10-CM

## 2025-01-21 RX ORDER — ALBUTEROL SULFATE 90 UG/1
2 INHALANT RESPIRATORY (INHALATION) EVERY 6 HOURS PRN
Qty: 18 G | Refills: 0 | Status: SHIPPED | OUTPATIENT
Start: 2025-01-21

## 2025-01-21 RX ORDER — AZITHROMYCIN 250 MG/1
TABLET, FILM COATED ORAL
Qty: 6 TABLET | Refills: 0 | Status: SHIPPED | OUTPATIENT
Start: 2025-01-21 | End: 2025-01-31

## 2025-01-21 RX ORDER — BUDESONIDE AND FORMOTEROL FUMARATE DIHYDRATE 160; 4.5 UG/1; UG/1
2 AEROSOL RESPIRATORY (INHALATION) 2 TIMES DAILY
Qty: 3 EACH | Refills: 3 | Status: SHIPPED | OUTPATIENT
Start: 2025-01-21

## 2025-01-21 NOTE — TELEPHONE ENCOUNTER
Patient called office asking about her medication refill. Patient was told medication was filled yesterday by provider. During call, patient asked if provider could please send a Z-Champ to her pharmacy for her. Patient has an appt scheduled but can't get in yet and was told to call her PCP for the script.    (M6) obeys commands

## 2025-04-13 DIAGNOSIS — I10 ESSENTIAL HYPERTENSION: ICD-10-CM

## 2025-04-14 RX ORDER — HYDROCHLOROTHIAZIDE 25 MG/1
25 TABLET ORAL DAILY
Qty: 90 TABLET | Refills: 0 | Status: SHIPPED | OUTPATIENT
Start: 2025-04-14

## 2025-04-14 NOTE — TELEPHONE ENCOUNTER
Last visit: 12/02/2024  Last Med refill: 09/23/2024  Does patient have enough medication for 72 hours: No:     Next Visit Date:  No future appointments.    Health Maintenance   Topic Date Due    Hepatitis B vaccine (1 of 3 - 19+ 3-dose series) Never done    Cervical cancer screen  06/20/2017    Pneumococcal 50+ years Vaccine (2 of 2 - PCV) 03/17/2018    Shingles vaccine (1 of 2) Never done    Lung Cancer Screening &/or Counseling  Never done    DTaP/Tdap/Td vaccine (2 - Td or Tdap) 01/11/2023    Breast cancer screen  10/14/2023    COVID-19 Vaccine (1 - 2024-25 season) Never done    Annual Wellness Visit (Medicare Advantage)  Never done    Flu vaccine (Season Ended) 08/01/2025    Lipids  08/24/2025    Depression Screen  12/02/2025    Colorectal Cancer Screen  05/08/2033    HIV screen  Completed    Hepatitis A vaccine  Aged Out    Hib vaccine  Aged Out    Polio vaccine  Aged Out    Meningococcal (ACWY) vaccine  Aged Out    Meningococcal B vaccine  Aged Out    Pneumococcal 0-49 years Vaccine  Discontinued       Hemoglobin A1C (%)   Date Value   09/01/2021 4.3             ( goal A1C is < 7)   No components found for: \"LABMICR\"  No components found for: \"LDLCHOLESTEROL\", \"LDLCALC\"    (goal LDL is <100)   AST (U/L)   Date Value   05/05/2023 20     ALT (U/L)   Date Value   05/05/2023 18     BUN (mg/dL)   Date Value   05/08/2023 5 (L)     BP Readings from Last 3 Encounters:   12/02/24 111/79   08/08/24 133/87   07/15/24 (!) 125/92          (goal 120/80)    All Future Testing planned in [facility]  Lab Frequency Next Occurrence   TRACEY Digital Screen Bilateral Once 12/02/2024   CT Lung Screen (Initial/Annual/Baseline) Once 12/02/2024               Patient Active Problem List:     Asthma     COPD (chronic obstructive pulmonary disease) (HCC)     Hepatitis C     GERD (gastroesophageal reflux disease)     Hyperlipidemia     HTN (hypertension)     Allergic dermatitis     Rash     Vaginal discharge     Cervicitis     Viral

## (undated) DEVICE — FORCEPS BX L240CM WRK CHN 2.8MM STD CAP W/ NDL MIC MESH